# Patient Record
Sex: MALE
[De-identification: names, ages, dates, MRNs, and addresses within clinical notes are randomized per-mention and may not be internally consistent; named-entity substitution may affect disease eponyms.]

---

## 2019-02-04 ENCOUNTER — HOSPITAL ENCOUNTER (INPATIENT)
Dept: HOSPITAL 5 - ED | Age: 44
LOS: 2 days | Discharge: HOME | DRG: 189 | End: 2019-02-06
Attending: INTERNAL MEDICINE | Admitting: INTERNAL MEDICINE
Payer: OTHER GOVERNMENT

## 2019-02-04 DIAGNOSIS — J20.9: ICD-10-CM

## 2019-02-04 DIAGNOSIS — R07.89: ICD-10-CM

## 2019-02-04 DIAGNOSIS — E66.01: ICD-10-CM

## 2019-02-04 DIAGNOSIS — G47.33: ICD-10-CM

## 2019-02-04 DIAGNOSIS — Z91.041: ICD-10-CM

## 2019-02-04 DIAGNOSIS — J96.01: Primary | ICD-10-CM

## 2019-02-04 DIAGNOSIS — Z71.6: ICD-10-CM

## 2019-02-04 DIAGNOSIS — J44.0: ICD-10-CM

## 2019-02-04 DIAGNOSIS — I10: ICD-10-CM

## 2019-02-04 DIAGNOSIS — E86.1: ICD-10-CM

## 2019-02-04 DIAGNOSIS — Z79.899: ICD-10-CM

## 2019-02-04 DIAGNOSIS — E87.1: ICD-10-CM

## 2019-02-04 DIAGNOSIS — F17.210: ICD-10-CM

## 2019-02-04 DIAGNOSIS — Z82.49: ICD-10-CM

## 2019-02-04 DIAGNOSIS — J96.21: ICD-10-CM

## 2019-02-04 DIAGNOSIS — Z79.82: ICD-10-CM

## 2019-02-04 DIAGNOSIS — E87.5: ICD-10-CM

## 2019-02-04 DIAGNOSIS — J44.1: ICD-10-CM

## 2019-02-04 LAB
BASOPHILS # (AUTO): 0.1 K/MM3 (ref 0–0.1)
BASOPHILS NFR BLD AUTO: 0.9 % (ref 0–1.8)
BUN SERPL-MCNC: 8 MG/DL (ref 9–20)
BUN/CREAT SERPL: 11 %
CALCIUM SERPL-MCNC: 9.2 MG/DL (ref 8.4–10.2)
EOSINOPHIL # BLD AUTO: 0.2 K/MM3 (ref 0–0.4)
EOSINOPHIL NFR BLD AUTO: 2.9 % (ref 0–4.3)
HCT VFR BLD CALC: 46 % (ref 35.5–45.6)
HEMOLYSIS INDEX: 10
HGB BLD-MCNC: 15.7 GM/DL (ref 11.8–15.2)
INR PPP: 0.98 (ref 0.87–1.13)
LYMPHOCYTES # BLD AUTO: 2 K/MM3 (ref 1.2–5.4)
LYMPHOCYTES NFR BLD AUTO: 28.8 % (ref 13.4–35)
MCHC RBC AUTO-ENTMCNC: 34 % (ref 32–34)
MCV RBC AUTO: 95 FL (ref 84–94)
MONOCYTES # (AUTO): 0.6 K/MM3 (ref 0–0.8)
MONOCYTES % (AUTO): 9.2 % (ref 0–7.3)
PLATELET # BLD: 215 K/MM3 (ref 140–440)
RBC # BLD AUTO: 4.85 M/MM3 (ref 3.65–5.03)

## 2019-02-04 PROCEDURE — 96375 TX/PRO/DX INJ NEW DRUG ADDON: CPT

## 2019-02-04 PROCEDURE — 85007 BL SMEAR W/DIFF WBC COUNT: CPT

## 2019-02-04 PROCEDURE — 83935 ASSAY OF URINE OSMOLALITY: CPT

## 2019-02-04 PROCEDURE — 93005 ELECTROCARDIOGRAM TRACING: CPT

## 2019-02-04 PROCEDURE — 80048 BASIC METABOLIC PNL TOTAL CA: CPT

## 2019-02-04 PROCEDURE — 36415 COLL VENOUS BLD VENIPUNCTURE: CPT

## 2019-02-04 PROCEDURE — 84550 ASSAY OF BLOOD/URIC ACID: CPT

## 2019-02-04 PROCEDURE — 84300 ASSAY OF URINE SODIUM: CPT

## 2019-02-04 PROCEDURE — 71045 X-RAY EXAM CHEST 1 VIEW: CPT

## 2019-02-04 PROCEDURE — 96365 THER/PROPH/DIAG IV INF INIT: CPT

## 2019-02-04 PROCEDURE — 82553 CREATINE MB FRACTION: CPT

## 2019-02-04 PROCEDURE — 85025 COMPLETE CBC W/AUTO DIFF WBC: CPT

## 2019-02-04 PROCEDURE — 99406 BEHAV CHNG SMOKING 3-10 MIN: CPT

## 2019-02-04 PROCEDURE — 94640 AIRWAY INHALATION TREATMENT: CPT

## 2019-02-04 PROCEDURE — 94760 N-INVAS EAR/PLS OXIMETRY 1: CPT

## 2019-02-04 PROCEDURE — 82550 ASSAY OF CK (CPK): CPT

## 2019-02-04 PROCEDURE — 83880 ASSAY OF NATRIURETIC PEPTIDE: CPT

## 2019-02-04 PROCEDURE — 84443 ASSAY THYROID STIM HORMONE: CPT

## 2019-02-04 PROCEDURE — 78452 HT MUSCLE IMAGE SPECT MULT: CPT

## 2019-02-04 PROCEDURE — 83735 ASSAY OF MAGNESIUM: CPT

## 2019-02-04 PROCEDURE — 84484 ASSAY OF TROPONIN QUANT: CPT

## 2019-02-04 PROCEDURE — 93010 ELECTROCARDIOGRAM REPORT: CPT

## 2019-02-04 PROCEDURE — 85610 PROTHROMBIN TIME: CPT

## 2019-02-04 PROCEDURE — 93017 CV STRESS TEST TRACING ONLY: CPT

## 2019-02-04 PROCEDURE — A9502 TC99M TETROFOSMIN: HCPCS

## 2019-02-04 RX ADMIN — METOPROLOL TARTRATE SCH MG: 50 TABLET, FILM COATED ORAL at 18:09

## 2019-02-04 RX ADMIN — BENZONATATE SCH MG: 100 CAPSULE ORAL at 21:53

## 2019-02-04 RX ADMIN — Medication SCH ML: at 21:54

## 2019-02-04 RX ADMIN — BENZONATATE SCH: 100 CAPSULE ORAL at 08:27

## 2019-02-04 RX ADMIN — NICOTINE SCH MG: 21 PATCH TRANSDERMAL at 12:03

## 2019-02-04 RX ADMIN — Medication SCH ML: at 12:05

## 2019-02-04 RX ADMIN — SODIUM CHLORIDE SCH MLS/HR: 0.9 INJECTION, SOLUTION INTRAVENOUS at 18:10

## 2019-02-04 RX ADMIN — IPRATROPIUM BROMIDE AND ALBUTEROL SULFATE SCH: .5; 3 SOLUTION RESPIRATORY (INHALATION) at 07:53

## 2019-02-04 RX ADMIN — METOPROLOL TARTRATE SCH MG: 50 TABLET, FILM COATED ORAL at 21:53

## 2019-02-04 RX ADMIN — IPRATROPIUM BROMIDE AND ALBUTEROL SULFATE SCH AMPUL: .5; 3 SOLUTION RESPIRATORY (INHALATION) at 20:51

## 2019-02-04 RX ADMIN — IPRATROPIUM BROMIDE AND ALBUTEROL SULFATE SCH AMPUL: .5; 3 SOLUTION RESPIRATORY (INHALATION) at 14:38

## 2019-02-04 RX ADMIN — ENOXAPARIN SODIUM SCH MG: 100 INJECTION SUBCUTANEOUS at 12:04

## 2019-02-04 RX ADMIN — BENZONATATE SCH MG: 100 CAPSULE ORAL at 14:51

## 2019-02-04 NOTE — XRAY REPORT
FINAL REPORT



PROCEDURE:  XR CHEST 1V AP



TECHNIQUE:  Chest radiograph anteroposterior view. CPT 02202







HISTORY:  sob,cough 



COMPARISON:  No prior studies are available for comparison.



FINDINGS:  

Heart: Normal.



Mediastinum/Vessels: Normal.



Lungs/Pleural space: Normal.



Bony thorax: No acute osseous abnormality.



Life support devices: None.



IMPRESSION:  

No acute cardiopulmonary abnormality.

## 2019-02-04 NOTE — EMERGENCY DEPARTMENT REPORT
ED Shortness of Breath HPI





- General


Chief Complaint: Dyspnea/Respdistress


Stated Complaint: CRISTIANO


Time Seen by Provider: 02/04/19 03:29


Source: patient, EMS, old records reviewed


Mode of arrival: Stretcher


Limitations: Physical Limitation





- History of Present Illness


Initial Comments: 





44-year-old male with a past medical history of morbid obesity and hypertension 

presents to the Hospital complaining of coughing up green sputum last 2 days.  

Positive intermittent wheezing and shortness of breath.  Patient complains of 

mid constant chest pressure radiating to his back.  Patient has had similar 

symptoms in the past related to bronchitis and denies a history of asthma and 

COPD.  He does smoke cigarettes.  Upon EMS arrival he was satting 88-90% on room

air.  Saturation increased to 94% with 4 L nasal cannula he received albuterol 

nebulizer in route.  Patient denies recent travel, calf tenderness, or 

unilateral leg edema.  [facility]





- Related Data


                                  Previous Rx's











 Medication  Instructions  Recorded  Last Taken  Type


 


Pravastatin Sodium [Pravastatin] 10 mg PO QHS #30 tablet 07/10/14 Unknown Rx


 


RX: Aspirin [Aspirin TAB] 325 mg PO QDAY #30 tablet 07/10/14 Unknown Rx


 


RX: Famotidine [Pepcid] 20 mg PO BID #60 tablet 07/10/14 Unknown Rx


 


RX: Lisinopril [Zestril TAB] 10 mg PO QDAY #30 tablet 07/10/14 02/03/19 Rx


 


RX: Metoprolol [Lopressor TAB] 50 mg PO BID #60 tablet 07/10/14 02/03/19 Rx


 


RX: amLODIPine [Norvasc] 5 mg PO QDAY #30 tablet 07/10/14 Unknown Rx











                                    Allergies











Allergy/AdvReac Type Severity Reaction Status Date / Time


 


Iodinated Contrast- Oral and Allergy  Rash Verified 07/08/14 21:15





IV Dye     














ED Review of Systems


ROS: 


Stated complaint: CRISTIANO


Other details as noted in HPI





Comment: All other systems reviewed and negative





ED Past Medical Hx





- Past Medical History


Hx Hypertension: Yes


Hx Congestive Heart Failure: No


Hx Diabetes: No


Hx Asthma: No


Hx COPD: No





- Surgical History


Past Surgical History?: No





- Social History


Smoking Status: Current Every Day Smoker


Substance Use Type: Alcohol





- Medications


Home Medications: 


                                Home Medications











 Medication  Instructions  Recorded  Confirmed  Last Taken  Type


 


Pravastatin Sodium [Pravastatin] 10 mg PO QHS #30 tablet 07/10/14 02/04/19 

Unknown Rx


 


RX: Aspirin [Aspirin TAB] 325 mg PO QDAY #30 tablet 07/10/14 02/04/19 Unknown Rx


 


RX: Famotidine [Pepcid] 20 mg PO BID #60 tablet 07/10/14 02/04/19 Unknown Rx


 


RX: Lisinopril [Zestril TAB] 10 mg PO QDAY #30 tablet 07/10/14 02/04/19 02/03/19

Rx


 


RX: Metoprolol [Lopressor TAB] 50 mg PO BID #60 tablet 07/10/14 02/04/19 

02/03/19 Rx


 


RX: amLODIPine [Norvasc] 5 mg PO QDAY #30 tablet 07/10/14 02/04/19 Unknown Rx














ED Physical Exam





- General


Limitations: Physical Limitation





- Other


Other exam information: 





General: No limitations, patient is alert in no acute distress


Head exam: Atraumatic, normocephalic


Eyes exam: Normal appearance, pupils equal reactive to light, extraocular 

movements intact


ENT: Moist mucous membrane, normal oropharynx


Neck exam: Normal inspection, full range of motion, no meningismus nontender


Respiratory exam: Frequent coughing.  Bilateral wheezing.  Tachypnea without 

accessory muscle use


Cardiovascular: Normal rate and rhythm, normal heart sounds


Abdomen: Soft, nondistended, and  nontender, with normal bowel sounds, no edwina

ound, or guarding


Extremity: Full range of motion normal inspection no deformity, no calf 

tenderness or edema


Back: Normal Inspection, full range of motion, no tenderness


Neurologic: Alert, oriented x3, cranial nerves intact, no motor or sensory 

deficit


Psychiatric: normal affect, normal mood


Skin: Warm, dry, intact





ED Course


                                   Vital Signs











  02/04/19 02/04/19 02/04/19





  02:43 02:46 02:48


 


Temperature   98 F


 


Pulse Rate  98 H 94 H


 


Pulse Rate [   





Anterior   





Bilateral   





Throughout]   


 


Respiratory  15 





Rate   


 


Respiratory   





Rate [Anterior   





Bilateral   





Throughout]   


 


Blood Pressure   113/53


 


O2 Sat by Pulse 94 93 94





Oximetry   














  02/04/19 02/04/19 02/04/19





  02:57 03:00 03:15


 


Temperature   


 


Pulse Rate  89 92 H


 


Pulse Rate [   





Anterior   





Bilateral   





Throughout]   


 


Respiratory 26 H 20 22





Rate   


 


Respiratory   





Rate [Anterior   





Bilateral   





Throughout]   


 


Blood Pressure  108/55 116/63


 


O2 Sat by Pulse 94 92 





Oximetry   














  02/04/19 02/04/19 02/04/19





  03:30 03:45 04:00


 


Temperature   


 


Pulse Rate 93 H 91 H 90


 


Pulse Rate [   





Anterior   





Bilateral   





Throughout]   


 


Respiratory 21 20 23





Rate   


 


Respiratory   





Rate [Anterior   





Bilateral   





Throughout]   


 


Blood Pressure 112/56  


 


O2 Sat by Pulse   





Oximetry   














  02/04/19 02/04/19 02/04/19





  04:13 04:16 04:30


 


Temperature   


 


Pulse Rate  96 H 95 H


 


Pulse Rate [ 92 H  





Anterior   





Bilateral   





Throughout]   


 


Respiratory  18 





Rate   


 


Respiratory 17  





Rate [Anterior   





Bilateral   





Throughout]   


 


Blood Pressure   109/52


 


O2 Sat by Pulse   94





Oximetry   














  02/04/19 02/04/19 02/04/19





  04:45 05:00 05:16


 


Temperature   


 


Pulse Rate 87 88 96 H


 


Pulse Rate [  95 H 





Anterior   





Bilateral   





Throughout]   


 


Respiratory 19 18 16





Rate   


 


Respiratory  20 





Rate [Anterior   





Bilateral   





Throughout]   


 


Blood Pressure 120/63 120/63 110/51


 


O2 Sat by Pulse 93 92 86





Oximetry   














  02/04/19 02/04/19 02/04/19





  05:30 05:45 06:00


 


Temperature   


 


Pulse Rate 97 H 86 93 H


 


Pulse Rate [   





Anterior   





Bilateral   





Throughout]   


 


Respiratory 22 16 24





Rate   


 


Respiratory   





Rate [Anterior   





Bilateral   





Throughout]   


 


Blood Pressure 103/70 110/55 102/53


 


O2 Sat by Pulse   





Oximetry   














  02/04/19 02/04/19 02/04/19





  06:10 06:20 06:30


 


Temperature   


 


Pulse Rate 93 H 85 94 H


 


Pulse Rate [   





Anterior   





Bilateral   





Throughout]   


 


Respiratory 25 H 16 17





Rate   


 


Respiratory   





Rate [Anterior   





Bilateral   





Throughout]   


 


Blood Pressure 102/53 96/41 96/41


 


O2 Sat by Pulse   





Oximetry   














  02/04/19 02/04/19 02/04/19





  06:40 06:49 06:50


 


Temperature   


 


Pulse Rate 88  92 H


 


Pulse Rate [  90 





Anterior   





Bilateral   





Throughout]   


 


Respiratory 20  16





Rate   


 


Respiratory  17 





Rate [Anterior   





Bilateral   





Throughout]   


 


Blood Pressure 116/56  117/44


 


O2 Sat by Pulse   





Oximetry   














  02/04/19 02/04/19 02/04/19





  07:00 07:10 07:20


 


Temperature   


 


Pulse Rate 91 H 93 H 99 H


 


Pulse Rate [   





Anterior   





Bilateral   





Throughout]   


 


Respiratory   





Rate   


 


Respiratory   





Rate [Anterior   





Bilateral   





Throughout]   


 


Blood Pressure 117/44 96/41 96/41


 


O2 Sat by Pulse   





Oximetry   














  02/04/19 02/04/19 02/04/19





  07:30 07:40 07:50


 


Temperature   


 


Pulse Rate  99 H 92 H


 


Pulse Rate [   





Anterior   





Bilateral   





Throughout]   


 


Respiratory   





Rate   


 


Respiratory   





Rate [Anterior   





Bilateral   





Throughout]   


 


Blood Pressure 96/41 117/44 117/44


 


O2 Sat by Pulse   





Oximetry   














  02/04/19 02/04/19 02/04/19





  08:00 08:10 08:20


 


Temperature   


 


Pulse Rate 101 H 103 H 99 H


 


Pulse Rate [   





Anterior   





Bilateral   





Throughout]   


 


Respiratory   





Rate   


 


Respiratory   





Rate [Anterior   





Bilateral   





Throughout]   


 


Blood Pressure 117/44 117/44 117/44


 


O2 Sat by Pulse   





Oximetry   














  02/04/19





  08:30


 


Temperature 


 


Pulse Rate 97 H


 


Pulse Rate [ 





Anterior 





Bilateral 





Throughout] 


 


Respiratory 





Rate 


 


Respiratory 





Rate [Anterior 





Bilateral 





Throughout] 


 


Blood Pressure 117/44


 


O2 Sat by Pulse 99





Oximetry 














ED Medical Decision Making





- Lab Data


Result diagrams: 


                                 02/04/19 03:38





                                 02/04/19 03:38








                                   Lab Results











  02/04/19 02/04/19 02/04/19 Range/Units





  03:38 03:38 03:38 


 


WBC  6.9    (4.5-11.0)  K/mm3


 


RBC  4.85    (3.65-5.03)  M/mm3


 


Hgb  15.7 H    (11.8-15.2)  gm/dl


 


Hct  46.0 H    (35.5-45.6)  %


 


MCV  95 H    (84-94)  fl


 


MCH  32    (28-32)  pg


 


MCHC  34    (32-34)  %


 


RDW  14.2    (13.2-15.2)  %


 


Plt Count  215    (140-440)  K/mm3


 


Lymph % (Auto)  28.8    (13.4-35.0)  %


 


Mono % (Auto)  9.2 H    (0.0-7.3)  %


 


Eos % (Auto)  2.9    (0.0-4.3)  %


 


Baso % (Auto)  0.9    (0.0-1.8)  %


 


Lymph #  2.0    (1.2-5.4)  K/mm3


 


Mono #  0.6    (0.0-0.8)  K/mm3


 


Eos #  0.2    (0.0-0.4)  K/mm3


 


Baso #  0.1    (0.0-0.1)  K/mm3


 


Seg Neutrophils %  58.2    (40.0-70.0)  %


 


Seg Neutrophils #  4.0    (1.8-7.7)  K/mm3


 


PT   13.4   (12.2-14.9)  Sec.


 


INR   0.98   (0.87-1.13)  


 


Sodium     (137-145)  mmol/L


 


Potassium     (3.6-5.0)  mmol/L


 


Chloride     ()  mmol/L


 


Carbon Dioxide     (22-30)  mmol/L


 


Anion Gap     mmol/L


 


BUN     (9-20)  mg/dL


 


Creatinine     (0.8-1.5)  mg/dL


 


Estimated GFR     ml/min


 


BUN/Creatinine Ratio     %


 


Glucose     ()  mg/dL


 


Calcium     (8.4-10.2)  mg/dL


 


Total Creatine Kinase    267 H  ()  units/L


 


CK-MB (CK-2)    7.0 H  (0.0-4.0)  ng/mL


 


CK-MB (CK-2) Rel Index    2.6  (0-4)  


 


Troponin T    < 0.010  (0.00-0.029)  ng/mL


 


NT-Pro-B Natriuret Pep    124.8  (0-450)  pg/mL














  02/04/19 Range/Units





  03:38 


 


WBC   (4.5-11.0)  K/mm3


 


RBC   (3.65-5.03)  M/mm3


 


Hgb   (11.8-15.2)  gm/dl


 


Hct   (35.5-45.6)  %


 


MCV   (84-94)  fl


 


MCH   (28-32)  pg


 


MCHC   (32-34)  %


 


RDW   (13.2-15.2)  %


 


Plt Count   (140-440)  K/mm3


 


Lymph % (Auto)   (13.4-35.0)  %


 


Mono % (Auto)   (0.0-7.3)  %


 


Eos % (Auto)   (0.0-4.3)  %


 


Baso % (Auto)   (0.0-1.8)  %


 


Lymph #   (1.2-5.4)  K/mm3


 


Mono #   (0.0-0.8)  K/mm3


 


Eos #   (0.0-0.4)  K/mm3


 


Baso #   (0.0-0.1)  K/mm3


 


Seg Neutrophils %   (40.0-70.0)  %


 


Seg Neutrophils #   (1.8-7.7)  K/mm3


 


PT   (12.2-14.9)  Sec.


 


INR   (0.87-1.13)  


 


Sodium  128 L  (137-145)  mmol/L


 


Potassium  4.4  (3.6-5.0)  mmol/L


 


Chloride  90.9 L  ()  mmol/L


 


Carbon Dioxide  24  (22-30)  mmol/L


 


Anion Gap  18  mmol/L


 


BUN  8 L  (9-20)  mg/dL


 


Creatinine  0.7 L  (0.8-1.5)  mg/dL


 


Estimated GFR  > 60  ml/min


 


BUN/Creatinine Ratio  11  %


 


Glucose  124 H  ()  mg/dL


 


Calcium  9.2  (8.4-10.2)  mg/dL


 


Total Creatine Kinase   ()  units/L


 


CK-MB (CK-2)   (0.0-4.0)  ng/mL


 


CK-MB (CK-2) Rel Index   (0-4)  


 


Troponin T   (0.00-0.029)  ng/mL


 


NT-Pro-B Natriuret Pep   (0-450)  pg/mL














- EKG Data


-: EKG Interpreted by Me


EKG shows normal: sinus rhythm, axis (qrs 69), QRS complexes (qrsd 105), ST-T 

waves (no stemi)


Rate: normal (93)





- EKG Data


When compared to previous EKG there are: no significant change





- Radiology Data


Radiology results: report reviewed (cxr: naf)





- Medical Decision Making





Patient continues to have desaturation once he is off oxygen despite receiving 

medications.  He will be admitted to the hospital for acute bronchitis with 

hypoxia.





- Differential Diagnosis


pneumonia, bronchitis, CHF, COPD


Critical Care Time: No


Critical care attestation.: 


If time is entered above; I have spent that time in minutes in the direct care 

of this critically ill patient, excluding procedure time.








ED Disposition


Clinical Impression: 


 Acute bronchitis, Hypoxia





Disposition: [id number] OP ADMIT IP TO THIS HOSP


Is pt being admited?: No


Does the pt Need Aspirin: No


Condition: Stable


Time of Disposition: 05:25 (Dr Courtney/hosp)

## 2019-02-04 NOTE — CONSULTATION
History of Present Illness


Consult date: 02/04/19


Reason for consult: dyspnea, chest pain, COPD


History of present illness: 





44-year-old male who presented to the ED with 3-4 days history of progressive 

chest tightness, chest pain, associated with shortness of breath and wheezing.  

The patient reports chest pain associated with persistent coughing episodes.  He

reports yellow to green colored phlegm.  Chest pain reported has precordial 

which some chest tightness.  He is an active smoker 2 packs per day for the last

20 years and was smoking up to the time of admission.  He denies fever or i

nfluenza-like illness symptoms.


He does report prior episodes of vigorous coughing prior to this.  Episodes were

strong enough for him to pass out.


Also reports sleep history that appeared to be consistent with sleep apnea-

reportedly snoring heavily, fell asleep during his cardiac scan with snoring and

choking.  Also poor sleep.  He denies prior history of asthma or COPD.


Ask to evaluate.  [facility]





Past History


Past Medical History: COPD, hypertension


Past Surgical History: No surgical history


Social history: other (patient is a smoker)


Family history: no significant family history





Medications and Allergies


                                    Allergies











Allergy/AdvReac Type Severity Reaction Status Date / Time


 


Iodinated Contrast- Oral and Allergy  Rash Verified 07/08/14 21:15





IV Dye     











                                Home Medications











 Medication  Instructions  Recorded  Confirmed  Last Taken  Type


 


Aspirin [Aspirin TAB] 325 mg PO QDAY #30 tablet 07/10/14 02/04/19 Unknown Rx


 


Famotidine [Pepcid] 20 mg PO BID #60 tablet 07/10/14 02/04/19 Unknown Rx


 


Lisinopril [Zestril TAB] 10 mg PO QDAY #30 tablet 07/10/14 02/04/19 02/03/19 Rx


 


Metoprolol [Lopressor TAB] 50 mg PO BID #60 tablet 07/10/14 02/04/19 02/03/19 Rx


 


Pravastatin Sodium [Pravastatin] 10 mg PO QHS #30 tablet 07/10/14 02/04/19 

Unknown Rx


 


amLODIPine [Norvasc] 5 mg PO QDAY #30 tablet 07/10/14 02/04/19 Unknown Rx











Active Meds: 


Active Medications





Acetaminophen (Tylenol)  650 mg PO Q4H PRN


   PRN Reason: Pain MILD(1-3)/Fever >100.5/HA


Albuterol/Ipratropium (Duoneb *Not For Prn Use*)  1 ampul IH Q6HRT [facility]


   Last Admin: 02/04/19 07:53 Dose:  Not Given


   Documented by: 


Benzonatate (Tessalon Perles)  100 mg PO Q8HR [facility]


   Last Admin: 02/04/19 08:27 Dose:  Not Given


   Documented by: 


Enoxaparin Sodium (Lovenox)  40 mg SUB-Q QDAY@1000 [facility]


   Last Admin: 02/04/19 12:04 Dose:  40 mg


   Documented by: 


Sodium Chloride (Nacl 0.9% 1000 Ml)  1,000 mls @ 75 mls/hr IV AS DIRECT [facility]


Methylprednisolone Sodium Succinate (Solu-Medrol)  40 mg IV Q8H [facility]


   Last Admin: 02/04/19 12:04 Dose:  40 mg


   Documented by: 


Nicotine (Habitrol)  21 mg TD QDAY [facility]


   Last Admin: 02/04/19 12:03 Dose:  21 mg


   Documented by: 


Ondansetron HCl (Zofran)  4 mg IV Q8H PRN


   PRN Reason: Nausea And Vomiting


Sodium Chloride (Sodium Chloride Flush Syringe 10 Ml)  10 ml IV BID [facility]


   Last Admin: 02/04/19 12:05 Dose:  10 ml


   Documented by: 


Sodium Chloride (Sodium Chloride Flush Syringe 10 Ml)  10 ml IV PRN PRN


   PRN Reason: LINE FLUSH











Physical Examination


Vital signs: 


                                   Vital Signs











Pulse Ox


 


 94 


 


 02/04/19 02:43











General appearance: no acute distress, other (morbidly obese)


Eyes: non-icteric


ENT: oropharynx moist, other (Mallampati 3-4)


Neck: supple, no JVD


Ascultation: Bilateral: diminished breath sounds, wheezes


Cardiovascular: regular rate and rhythm


Gastrointestinal: normoactive bowel sounds, non-distended


Integumentary: normal


Extremities: no cyanosis, no edema


Gait: normal gait


normal mental status, non-focal exam, pupils equal and round, CN II-XII normal, 

motor strength normal and


mood appropriate, affect normal





Results





- Laboratory Findings


CBC and BMP: 


                                 02/04/19 03:38





                                 02/04/19 03:38


PT/INR, D-dimer











PT  13.4 Sec. (12.2-14.9)   02/04/19  03:38    


 


INR  0.98  (0.87-1.13)   02/04/19  03:38    








Abnormal lab findings: 


                                  Abnormal Labs











  02/04/19 02/04/19 02/04/19





  03:38 03:38 03:38


 


Hgb  15.7 H  


 


Hct  46.0 H  


 


MCV  95 H  


 


Mono % (Auto)  9.2 H  


 


Sodium    128 L


 


Chloride    90.9 L


 


BUN    8 L


 


Creatinine    0.7 L


 


Glucose    124 H


 


Total Creatine Kinase   267 H 


 


CK-MB (CK-2)   7.0 H 














  02/04/19 02/04/19





  06:36 10:07


 


Hgb  


 


Hct  


 


MCV  


 


Mono % (Auto)  


 


Sodium  


 


Chloride  


 


BUN  


 


Creatinine  


 


Glucose  


 


Total Creatine Kinase  262 H 


 


CK-MB (CK-2)  6.2 H  6.7 H














- Diagnostic Findings


Chest x-ray: report reviewed, image reviewed





Assessment and Plan





COPD with exacerbation


Acute exacerbation of chronic bronchitis


Chest pain.  Under evaluation by cardiology


Hyponatremia.  Nephrology following, SIADH suspected


Suspected SDB/LORE


Morbid obesity


Hypertension








Recommendations





Complete cardiac evaluation for CAD, atypical chest pain


Albuterol 2.5 milligram nebulizations every 4-6 hours with or without 

ipratropium


Solu-Medrol 40-60 mg IV every 6-8 hours


Consider 5 days of Rocephin or suitable equivalent


Oxygen support via nasal cannula or mask to maintain oximetry over 92%


Additional evaluation for weight reduction program


Smoking cessation discussed in detail.


When the further evaluation as an outpatient both for COPD and for LORE.


DVT prophylaxis











Findings were discussed with the patient in detail.  All questions answered.  

Smoking cessation was strongly encouraged, initial intervention








Thanks

## 2019-02-04 NOTE — HISTORY AND PHYSICAL REPORT
History of Present Illness


Date of examination: 02/04/19


History of present illness: 


43-year-old man with a history of hypertension, morbid obesity, emergency room 

visits or shortness of breath 2 days.  He complains of cough productive of 

green phlegm, wheezing.  Also complaining of chest pain in the epigastric area 

which he describes a pressure-like sensation, intermittent, unable to say how 

long it lasts for, radiating to the back, intensity 5/10, worse with coughing.  

Admits to diaphoresis, no nausea vomiting, palpitation


Review of systems


Constitutional: no  weight loss, chills, fever


Ears, eyes, nose, mouth and throat: no nasal congestion, no nasal discharge, no 

sinus pressure, no vision change, no red eye.


Neck: No neck pain or rigidity.


Cardiovascular: no palpitations, chest pain


Respiratory: +cough, shortness of breath


Gastrointestinal: no hematochezia, abdominal pain


Genitourinary : no  frequency , no hematuria


Musculoskeletal: no joint swelling or muscle ache 


Integumentary: no rash, no pruritis


Neurological: no parathesias, no focal weakness


Endocrine: no cold or heat intolerance, no polyuria or polydipsia


Hematologic/Lymphatic: no easy bruising, no easy bleeding, no gland swelling


Allergic/Immunologic: no urticaria, no angioedema.





PAST MEDICAL HISTORY:hypertension, morbid obesity





PAST SURGICAL HISTORY: None





SOCIAL HISTORY: Denies alcohol,  drugs, smoke 2 packs a day 





FAMILY HISTORY: Hypertension








Medications and Allergies


                                    Allergies











Allergy/AdvReac Type Severity Reaction Status Date / Time


 


Iodinated Contrast- Oral and Allergy  Rash Verified 07/08/14 21:15





IV Dye     











                                Home Medications











 Medication  Instructions  Recorded  Confirmed  Last Taken  Type


 


RX: Aspirin [Aspirin TAB] 325 mg PO QDAY #30 tablet 07/10/14 02/04/19 Unknown Rx


 


RX: Famotidine [Pepcid] 20 mg PO BID #60 tablet 07/10/14 02/04/19 Unknown Rx


 


RX: Lisinopril [Zestril TAB] 10 mg PO QDAY #30 tablet 07/10/14 02/04/19 02/03/19

Rx


 


RX: Pravastatin Sodium 10 mg PO QHS #30 tablet 07/10/14 02/04/19 Unknown Rx





[Pravastatin]     


 


RX: amLODIPine [Norvasc] 5 mg PO QDAY #30 tablet 07/10/14 02/04/19 Unknown Rx


 


Prednisone [predniSONE 10 mg 10 mg PO .TAPER #1 tab.ds.pk 02/06/19  Unknown Rx





(6-Day Pack, 21 Tabs)]     


 


RX: ALBUTEROL Inhaler(NF) 2 puff IH QID PRN #1 inha 02/06/19  Unknown Rx





[VENTOLIN Inhaler(NF)]     


 


RX: Benzonatate [Tessalon Perles] 100 mg PO Q8HR #30 capsule 02/06/19  Unknown 

Rx


 


RX: Nicotine [Habitrol] 21 mg TD QDAY #30 patch 02/06/19  Unknown Rx











Active Meds: 


Active Medications





Acetaminophen (Tylenol)  650 mg PO Q4H PRN


   PRN Reason: Pain MILD(1-3)/Fever >100.5/HA


Albuterol/Ipratropium (Duoneb *Not For Prn Use*)  1 ampul IH Q6HRT [facility]


Enoxaparin Sodium (Lovenox)  30 mg SUB-Q QDAY [facility]


Methylprednisolone Sodium Succinate (Solu-Medrol)  125 mg IV Q6H [facility]


Ondansetron HCl (Zofran)  4 mg IV Q8H PRN


   PRN Reason: Nausea And Vomiting


Sodium Chloride (Sodium Chloride Flush Syringe 10 Ml)  10 ml IV BID [facility]


Sodium Chloride (Sodium Chloride Flush Syringe 10 Ml)  10 ml IV PRN PRN


   PRN Reason: LINE FLUSH











Exam





- Physical Exam


Narrative exam: 


General Apperance: The patient lying in bed,  breathing comfortable 


HEENT: Normocephalic, atraumatic.  Pupils equally round and reactive to light, 

EOMI, no sclericterus or JVD or thyromegaly or nodule.  , no carotid bruit, 

mucous membranes moist, no exudate or erythema


Heart: S1-S2, regular is rhythm


Lungs: Wheezing bilaterally, breathing comfortable


Abdomen: Positive bowel sounds, soft, nontender, nondistended, no organomegaly


Extremities: No edema cyanosis clubbing


Skin: no rash, nodule, warm and dry


Neuro: cranial nerves 2-12 intact, speech is fluent, motor/sensory intact








- Constitutional


Vitals: 


                                        











Temp Pulse Resp BP Pulse Ox


 


 98 F   86   16   110/55   86 


 


 02/04/19 02:48  02/04/19 05:45  02/04/19 05:45  02/04/19 05:45  02/04/19 05:16














Results





- Labs


CBC & Chem 7: 


                                 02/06/19 05:25





                                 02/06/19 05:25


Labs: 


                              Abnormal lab results











  02/04/19 02/04/19 02/04/19 Range/Units





  03:38 03:38 03:38 


 


Hgb  15.7 H    (11.8-15.2)  gm/dl


 


Hct  46.0 H    (35.5-45.6)  %


 


MCV  95 H    (84-94)  fl


 


Mono % (Auto)  9.2 H    (0.0-7.3)  %


 


Sodium    128 L  (137-145)  mmol/L


 


Chloride    90.9 L  ()  mmol/L


 


BUN    8 L  (9-20)  mg/dL


 


Creatinine    0.7 L  (0.8-1.5)  mg/dL


 


Glucose    124 H  ()  mg/dL


 


Total Creatine Kinase   267 H   ()  units/L


 


CK-MB (CK-2)   7.0 H   (0.0-4.0)  ng/mL














- Imaging and Cardiology


EKG: image reviewed


Chest x-ray: report reviewed





Assessment and Plan


Assessment


Acute bronchitis


Hyponatremia


Chest pain


Morbid morbid obesity


Plan


Admit to medicine


High-dose steroids, breathing treatments, Zithromax


Cardiac enzymes, stress test


DVT prophylaxis

## 2019-02-04 NOTE — CONSULTATION
History of Present Illness





- Reason for Consult


Consult date: 02/04/19


hyponatremia


Requesting physician: LYNDSEY JAUREGUI





- History of Present Illness


43-year-old man with a history of hypertension, morbid obesity, emergency room 

visits or shortness of breath 2 days.  He complains of cough productive of 

green phlegm, wheezing.  Also complaining of chest pain in the epigastric area 

which he describes a pressure-like sensation, intermittent, unable to sell along

the lesser, radiating to the back, intensity 5/10, worse with coughing.  Admits 

to diaphoresis, no nausea vomiting, palpitation. 


Consult requested because of hyponatremia





Past History


Past Medical History: COPD, hypertension


Past Surgical History: No surgical history


Social history: other (patient is a smoker)


Family history: no significant family history





Medications and Allergies


                                    Allergies











Allergy/AdvReac Type Severity Reaction Status Date / Time


 


Iodinated Contrast- Oral and Allergy  Rash Verified 07/08/14 21:15





IV Dye     











                                Home Medications











 Medication  Instructions  Recorded  Confirmed  Last Taken  Type


 


Aspirin [Aspirin TAB] 325 mg PO QDAY #30 tablet 07/10/14 02/04/19 Unknown Rx


 


Famotidine [Pepcid] 20 mg PO BID #60 tablet 07/10/14 02/04/19 Unknown Rx


 


Lisinopril [Zestril TAB] 10 mg PO QDAY #30 tablet 07/10/14 02/04/19 02/03/19 Rx


 


Metoprolol [Lopressor TAB] 50 mg PO BID #60 tablet 07/10/14 02/04/19 02/03/19 Rx


 


Pravastatin Sodium [Pravastatin] 10 mg PO QHS #30 tablet 07/10/14 02/04/19 

Unknown Rx


 


amLODIPine [Norvasc] 5 mg PO QDAY #30 tablet 07/10/14 02/04/19 Unknown Rx











Active Meds: 


Active Medications





Acetaminophen (Tylenol)  650 mg PO Q4H PRN


   PRN Reason: Pain MILD(1-3)/Fever >100.5/HA


Albuterol/Ipratropium (Duoneb *Not For Prn Use*)  1 ampul IH Q6HRT [facility]


   Last Admin: 02/04/19 07:53 Dose:  Not Given


   Documented by: 


Benzonatate (Tessalon Perles)  100 mg PO Q8HR [facility]


Enoxaparin Sodium (Lovenox)  40 mg SUB-Q QDAY@1000 MING


Sodium Chloride (Nacl 0.9% 1000 Ml)  1,000 mls @ 75 mls/hr IV AS DIRECT MING


Methylprednisolone Sodium Succinate (Solu-Medrol)  40 mg IV Q8HR MING


Nicotine (Habitrol)  21 mg TD QDAY MING


Ondansetron HCl (Zofran)  4 mg IV Q8H PRN


   PRN Reason: Nausea And Vomiting


Sodium Chloride (Sodium Chloride Flush Syringe 10 Ml)  10 ml IV BID MING


Sodium Chloride (Sodium Chloride Flush Syringe 10 Ml)  10 ml IV PRN PRN


   PRN Reason: LINE FLUSH











Review of Systems


All systems: negative (negative except as noted above)





Exam





- Vital Signs


Vital signs: 


                                   Vital Signs











Pulse Ox


 


 94 


 


 02/04/19 02:43














- General Appearance


General appearance: well-developed, well-nourished, appears stated age, obese


EENT: ATNC, PERRL, mucous membranes moist


Neck: Present: neck supple, trachea midline.  Absent: JVD/HJR, Masses


Respiratory: Wheezes (bilateral wheezing)


Heart: regular, normal heart rate, S1S2, no murmurs


Gastrointestinal: Present: normal, normoactive bowel sounds


Integumentary: other (no edema)





Results





- Lab Results





                                 02/04/19 03:38





                                 02/04/19 03:38


                             Most recent lab results











Calcium  9.2 mg/dL (8.4-10.2)   02/04/19  03:38    














Assessment and Plan


Impression


* Hyponatremia.  Patient is clinically euvolemic


* Shortness of breath.  Most likely secondary to COPD


* Chest pain


* Hypertension


* Obesity


Recommendation


* Suspect patient  has underlying SIADH.


* Shall do  hyponatremia workup


* Restrict free water intake


* Avoid hydrochlorothiazide and other medications that can aggravate 

  hyponatremia


* Monitor fluid status and electrolytes closely


* Thank you very much for the consultation.  Shall follow along with you

## 2019-02-04 NOTE — PROGRESS NOTE
Assessment and Plan


Assessment and plan: 


Patient is a Patient is a 43-year-old man with a history of hypertension, 

dyslipidemia, morbid obesity and Tobacco dependency who presents this morning to

[facility] ED with SOB x 2 days.





* pCXR reported no acute cardiopulmonary process





Suspected Acute hypoxic respiratory failure, poa on VM 50%, EMS reported a low 

pulse ox of 88% RA: continue o2 therapy and nebs, consulted Pulmonology


Acute bronchitis: treat with abx and nebs, utility of steroids


Moderate to severe Hyponatremia, hypovolemia: treat with ivf, repeat in am


Cushingoid appearance and hyponatremia: consulted Nephrology, ?steroids


Chest pain, atypical, related to coughing most likely, father  of MI though:

Cardiac enzymes, stress test ordered


Morbid obesity, bmi 49.5


Tobacco dependency (~2 ppd):  on stopping, offer and ordered nicotine 

patch


DVT prophylaxis: sq lovenox





Prolonged inpatient services 32 minutes








History


Interval history: 


Patient was seen and examined. Follow-up on current diagnosis of SOB and chest 

pains. Chest pains is due to excessive coughing, Overnight uneventful. Patient 

denies any nausea/vomiting or severe headaches. Imaging, nursing note, chart, 

labs and old chart reviewed. Discussed with patient. 





Hospitalist Physical





- Physical exam


Narrative exam: 


Gen: Morbid Obese, bmi 49.5Cushingoid vs blue bloater appearance, ill appearing,

on VM 50%, NAD, Awake, Alert, Orientated x 3


HEENT: NCAT, EOMI, PERRL, OP Clear 


Neck: supple, no adenopathy, no thyromegaly, no JVD 


CVS/Heart: RRR, normal S1S2, pulses present bilaterally 


Chest/Lungs: diminished bs bilateral, Symmetrical chest expansion, good air 

entry bilaterally, reproducible substernal cw tenderness


GI/Abdomen: soft, abd striae, NTND, good bowel sounds, no guarding or rebound 


/Bladder: no suprapubic tenderness, no CVA or paraspinal tenderness 


Extermity/Skin: no c/c/e, no obvious rash 


MSK: FROM x 4 


Neuro: CN 2-12 grossly intact, no new focal deficits 


Psych: calm 





- Constitutional


Vitals: 


                                        











Temp Pulse Resp BP Pulse Ox


 


 98 F   90   17   110/55   86 


 


 19 02:48  19 06:49  19 06:49  19 05:45  19 05:16














Results





- Labs


CBC & Chem 7: 


                                 19 03:38





                                 19 03:38


Labs: 


                             Laboratory Last Values











WBC  6.9 K/mm3 (4.5-11.0)   19  03:38    


 


RBC  4.85 M/mm3 (3.65-5.03)   19  03:38    


 


Hgb  15.7 gm/dl (11.8-15.2)  H  19  03:38    


 


Hct  46.0 % (35.5-45.6)  H  19  03:38    


 


MCV  95 fl (84-94)  H  19  03:38    


 


MCH  32 pg (28-32)   19  03:38    


 


MCHC  34 % (32-34)   19  03:38    


 


RDW  14.2 % (13.2-15.2)   19  03:38    


 


Plt Count  215 K/mm3 (140-440)   19  03:38    


 


Lymph % (Auto)  28.8 % (13.4-35.0)   19  03:38    


 


Mono % (Auto)  9.2 % (0.0-7.3)  H  19  03:38    


 


Eos % (Auto)  2.9 % (0.0-4.3)   19  03:38    


 


Baso % (Auto)  0.9 % (0.0-1.8)   19  03:38    


 


Lymph #  2.0 K/mm3 (1.2-5.4)   19  03:38    


 


Mono #  0.6 K/mm3 (0.0-0.8)   19  03:38    


 


Eos #  0.2 K/mm3 (0.0-0.4)   19  03:38    


 


Baso #  0.1 K/mm3 (0.0-0.1)   19  03:38    


 


Seg Neutrophils %  58.2 % (40.0-70.0)   19  03:38    


 


Seg Neutrophils #  4.0 K/mm3 (1.8-7.7)   19  03:38    


 


PT  13.4 Sec. (12.2-14.9)   19  03:38    


 


INR  0.98  (0.87-1.13)   19  03:38    


 


Sodium  128 mmol/L (137-145)  L  19  03:38    


 


Potassium  4.4 mmol/L (3.6-5.0)   19  03:38    


 


Chloride  90.9 mmol/L ()  L  19  03:38    


 


Carbon Dioxide  24 mmol/L (22-30)   19  03:38    


 


Anion Gap  18 mmol/L  19  03:38    


 


BUN  8 mg/dL (9-20)  L  19  03:38    


 


Creatinine  0.7 mg/dL (0.8-1.5)  L  19  03:38    


 


Estimated GFR  > 60 ml/min  19  03:38    


 


BUN/Creatinine Ratio  11 %  19  03:38    


 


Glucose  124 mg/dL ()  H  19  03:38    


 


Calcium  9.2 mg/dL (8.4-10.2)   19  03:38    


 


Total Creatine Kinase  267 units/L ()  H  19  03:38    


 


CK-MB (CK-2)  6.2 ng/mL (0.0-4.0)  H  19  06:36    


 


CK-MB (CK-2) Rel Index  2.6  (0-4)   19  03:38    


 


Troponin T  < 0.010 ng/mL (0.00-0.029)   19  06:36    


 


NT-Pro-B Natriuret Pep  124.8 pg/mL (0-450)   19  03:38

## 2019-02-04 NOTE — TREADMILL REPORT
THALLIUM STRESS TEST



LEFT VENTRICLE:  Left ventricular chamber size is within normal spread. 

Perfusion study demonstrates homogeneous uptake of the tracer in all segments,

no significant perfusion defects identified.  Gated analysis demonstrates normal

left ventricular systolic function, ejection fraction greater than 70%.



CONCLUSION:  Normal myocardial perfusion study.





DD: 02/04/2019 11:36

DT: 02/04/2019 17:12

JOB# [id number]  [id number]

CA/NTS

## 2019-02-05 LAB
ANISOCYTOSIS BLD QL SMEAR: (no result)
BAND NEUTROPHILS # (MANUAL): 0.4 K/MM3
BUN SERPL-MCNC: 8 MG/DL (ref 9–20)
BUN/CREAT SERPL: 16 %
CALCIUM SERPL-MCNC: 9.2 MG/DL (ref 8.4–10.2)
HCT VFR BLD CALC: 48 % (ref 35.5–45.6)
HEMOLYSIS INDEX: 5
HGB BLD-MCNC: 15.9 GM/DL (ref 11.8–15.2)
MCHC RBC AUTO-ENTMCNC: 33 % (ref 32–34)
MCV RBC AUTO: 97 FL (ref 84–94)
MYELOCYTES # (MANUAL): 0 K/MM3
PLATELET # BLD: 229 K/MM3 (ref 140–440)
PROMYELOCYTES # (MANUAL): 0 K/MM3
RBC # BLD AUTO: 4.96 M/MM3 (ref 3.65–5.03)
TOTAL CELLS COUNTED BLD: 100

## 2019-02-05 RX ADMIN — SODIUM CHLORIDE SCH MLS/HR: 0.9 INJECTION, SOLUTION INTRAVENOUS at 05:55

## 2019-02-05 RX ADMIN — BENZONATATE SCH MG: 100 CAPSULE ORAL at 21:53

## 2019-02-05 RX ADMIN — BENZONATATE SCH MG: 100 CAPSULE ORAL at 05:55

## 2019-02-05 RX ADMIN — ENOXAPARIN SODIUM SCH MG: 100 INJECTION SUBCUTANEOUS at 09:22

## 2019-02-05 RX ADMIN — Medication SCH ML: at 22:08

## 2019-02-05 RX ADMIN — IPRATROPIUM BROMIDE AND ALBUTEROL SULFATE SCH: .5; 3 SOLUTION RESPIRATORY (INHALATION) at 03:48

## 2019-02-05 RX ADMIN — METOPROLOL TARTRATE SCH MG: 50 TABLET, FILM COATED ORAL at 22:02

## 2019-02-05 RX ADMIN — NICOTINE SCH MG: 21 PATCH TRANSDERMAL at 09:22

## 2019-02-05 RX ADMIN — METOPROLOL TARTRATE SCH MG: 50 TABLET, FILM COATED ORAL at 09:22

## 2019-02-05 RX ADMIN — IPRATROPIUM BROMIDE AND ALBUTEROL SULFATE SCH AMPUL: .5; 3 SOLUTION RESPIRATORY (INHALATION) at 07:20

## 2019-02-05 RX ADMIN — IPRATROPIUM BROMIDE AND ALBUTEROL SULFATE SCH AMPUL: .5; 3 SOLUTION RESPIRATORY (INHALATION) at 20:15

## 2019-02-05 RX ADMIN — Medication SCH ML: at 09:22

## 2019-02-05 RX ADMIN — IPRATROPIUM BROMIDE AND ALBUTEROL SULFATE SCH AMPUL: .5; 3 SOLUTION RESPIRATORY (INHALATION) at 13:24

## 2019-02-05 RX ADMIN — BENZONATATE SCH MG: 100 CAPSULE ORAL at 14:09

## 2019-02-05 NOTE — PROGRESS NOTE
Assessment and Plan


Assessment and plan: 


Patient is a Patient is a 43-year-old man with a history of hypertension, 

dyslipidemia, morbid obesity and Tobacco dependency who presents this morning to

[facility] ED with SOB x 2 days.





* pCXR reported no acute cardiopulmonary process





Suspected Acute hypoxic respiratory failure, poa on VM 50%, EMS reported a low 

pulse ox of 88% RA: continue o2 therapy and nebs, consulted Pulmonology


Acute bronchitis: treat with abx and nebs, utility of steroids


Hyperkalemia; Kayexalate.  Follow electrolytes


Moderate to severe Hyponatremia, hypovolemia: treat with ivf, repeat in am


Cushingoid appearance and hyponatremia: consulted Nephrology, ?steroids


Chest pain, atypical, related to coughing most likely, father  of MI though:

Cardiac enzymes, stress test negative


Morbid obesity, bmi 49.5;                                       


Tobacco dependency (~2 ppd):  on stopping, offer and ordered nicotine 

patch


DVT prophylaxis: sq lovenox











History


Interval history: 


Patient seen and examined medical records reviewed


No new Events reported by the nursing staff


Admitted with acute on chronic respiratory failure


COPD exacerbation


Patient feels slightly better


Vital signs reviewed








Hospitalist Physical





- Constitutional


Vitals: 


                                        











Temp Pulse Resp BP Pulse Ox


 


 98.7 F   88   16   140/78   94 


 


 19 05:02  19 09:22  19 07:27  19 09:22  19 08:47











General appearance: Present: mild distress, well-nourished, obese (morbidly 

obese)





- EENT


Eyes: Present: PERRL, EOM intact





- Neck


Neck: Present: supple, normal ROM





- Respiratory


Respiratory effort: labored


Respiratory: bilateral: diminished, rhonchi, negative: rales, wheezing





- Cardiovascular


Rhythm: regular


Heart Sounds: Present: S1 & S2





- Extremities


Extremities: no ischemia


Extremity abnormal: edema





- Abdominal


General gastrointestinal: soft, non-tender, non-distended, normal bowel sounds





- Integumentary


Integumentary: Present: clear, warm





- Psychiatric


Psychiatric: appropriate mood/affect, cooperative





- Neurologic


Neurologic: moves all extremities





Results





- Labs


CBC & Chem 7: 


                                 19 04:28





                                 19 04:28


Labs: 


                             Laboratory Last Values











WBC  14.5 K/mm3 (4.5-11.0)  H  19  04:28    


 


RBC  4.96 M/mm3 (3.65-5.03)   19  04:28    


 


Hgb  15.9 gm/dl (11.8-15.2)  H  19  04:28    


 


Hct  48.0 % (35.5-45.6)  H  19  04:28    


 


MCV  97 fl (84-94)  H  19  04:28    


 


MCH  32 pg (28-32)   19  04:28    


 


MCHC  33 % (32-34)   19  04:28    


 


RDW  14.5 % (13.2-15.2)   19  04:28    


 


Plt Count  229 K/mm3 (140-440)   19  04:28    


 


Lymph % (Auto)  28.8 % (13.4-35.0)   19  03:38    


 


Mono % (Auto)  9.2 % (0.0-7.3)  H  19  03:38    


 


Eos % (Auto)  2.9 % (0.0-4.3)   19  03:38    


 


Baso % (Auto)  0.9 % (0.0-1.8)   19  03:38    


 


Lymph #  2.0 K/mm3 (1.2-5.4)   19  03:38    


 


Mono #  0.6 K/mm3 (0.0-0.8)   19  03:38    


 


Eos #  0.2 K/mm3 (0.0-0.4)   19  03:38    


 


Baso #  0.1 K/mm3 (0.0-0.1)   19  03:38    


 


Add Manual Diff  Complete   19  04:28    


 


Total Counted  100   19  04:28    


 


Seg Neutrophils %  Np   19  04:28    


 


Seg Neuts % (Manual)  87.0 % (40.0-70.0)  H  19  04:28    


 


Band Neutrophils %  3.0 %  19  04:28    


 


Lymphocytes % (Manual)  6.0 % (13.4-35.0)  L  19  04:28    


 


Reactive Lymphs % (Man)  0 %  19  04:28    


 


Monocytes % (Manual)  4.0 % (0.0-7.3)   19  04:28    


 


Eosinophils % (Manual)  0 % (0.0-4.3)   19  04:28    


 


Basophils % (Manual)  0 % (0.0-1.8)   19  04:28    


 


Metamyelocytes %  0 %  19  04:28    


 


Myelocytes %  0 %  19  04:28    


 


Promyelocytes %  0 %  19  04:28    


 


Blast Cells %  0 %  19  04:28    


 


Nucleated RBC %  Not Reportable   19  04:28    


 


Seg Neutrophils #  4.0 K/mm3 (1.8-7.7)   19  03:38    


 


Seg Neutrophils # Man  12.6 K/mm3 (1.8-7.7)  H  19  04:28    


 


Band Neutrophils #  0.4 K/mm3  19  04:28    


 


Lymphocytes # (Manual)  0.9 K/mm3 (1.2-5.4)  L  19  04:28    


 


Abs React Lymphs (Man)  0.0 K/mm3  19  04:28    


 


Monocytes # (Manual)  0.6 K/mm3 (0.0-0.8)   19  04:28    


 


Eosinophils # (Manual)  0.0 K/mm3 (0.0-0.4)   19  04:28    


 


Basophils # (Manual)  0.0 K/mm3 (0.0-0.1)   19  04:28    


 


Metamyelocytes #  0.0 K/mm3  19  04:28    


 


Myelocytes #  0.0 K/mm3  19  04:28    


 


Promyelocytes #  0.0 K/mm3  19  04:28    


 


Blast Cells #  0.0 K/mm3  19  04:28    


 


WBC Morphology  Not Reportable   19  04:28    


 


Hypersegmented Neuts  Not Reportable   19  04:28    


 


Hyposegmented Neuts  Not Reportable   19  04:28    


 


Hypogranular Neuts  Not Reportable   19  04:28    


 


Smudge Cells  Not Reportable   19  04:28    


 


Toxic Granulation  Not Reportable   19  04:28    


 


Toxic Vacuolation  Not Reportable   19  04:28    


 


Dohle Bodies  Not Reportable   19  04:28    


 


Pelger-Huet Anomaly  Not Reportable   19  04:28    


 


Murtaza Rods  Not Reportable   19  04:28    


 


Platelet Estimate  Consistent w auto   19  04:28    


 


Clumped Platelets  Not Reportable   19  04:28    


 


Plt Clumps, EDTA  Not Reportable   19  04:28    


 


Large Platelets  Not Reportable   19  04:28    


 


Giant Platelets  Not Reportable   19  04:28    


 


Platelet Satelliting  Not Reportable   19  04:28    


 


Plt Morphology Comment  Not Reportable   19  04:28    


 


RBC Morphology  Not Reportable   19  04:28    


 


Dimorphic RBCs  Not Reportable   19  04:28    


 


Polychromasia  Not Reportable   19  04:28    


 


Hypochromasia  Not Reportable   19  04:28    


 


Poikilocytosis  Not Reportable   19  04:28    


 


Anisocytosis  1+   19  04:28    


 


Microcytosis  Not Reportable   19  04:28    


 


Macrocytosis  Not Reportable   19  04:28    


 


Spherocytes  Not Reportable   19  04:28    


 


Pappenheimer Bodies  Not Reportable   19  04:28    


 


Sickle Cells  Not Reportable   19  04:28    


 


Target Cells  Not Reportable   19  04:28    


 


Tear Drop Cells  Not Reportable   19  04:28    


 


Ovalocytes  Not Reportable   19  04:28    


 


Helmet Cells  Not Reportable   19  04:28    


 


[place] Bodies  Not Reportable   19  04:28    


 


[place] Rings  Not Reportable   19  04:28    


 


[place] Cells  Not Reportable   19  04:28    


 


Bite Cells  Not Reportable   19  04:28    


 


Crenated Cell  Not Reportable   19  04:28    


 


Elliptocytes  Not Reportable   19  04:28    


 


Acanthocytes (Spur)  Not Reportable   19  04:28    


 


Rouleaux  Not Reportable   19  04:28    


 


Hemoglobin C Crystals  Not Reportable   19  04:28    


 


Schistocytes  Not Reportable   19  04:28    


 


Malaria parasites  Not Reportable   19  04:28    


 


[place] Bodies  Not Reportable   19  04:28    


 


Hem Pathologist Commnt  No   19  04:28    


 


PT  13.4 Sec. (12.2-14.9)   19  03:38    


 


INR  0.98  (0.87-1.13)   19  03:38    


 


Sodium  135 mmol/L (137-145)  L D 19  04:28    


 


Potassium  5.4 mmol/L (3.6-5.0)  H D 19  04:28    


 


Chloride  96.6 mmol/L ()  L  19  04:28    


 


Carbon Dioxide  28 mmol/L (22-30)   19  04:28    


 


Anion Gap  16 mmol/L  19  04:28    


 


BUN  8 mg/dL (9-20)  L  19  04:28    


 


Creatinine  0.5 mg/dL (0.8-1.5)  L  19  04:28    


 


Estimated GFR  > 60 ml/min  19  04:28    


 


BUN/Creatinine Ratio  16 %  19  04:28    


 


Glucose  148 mg/dL ()  H  19  04:28    


 


Uric Acid  5.5 mg/dL (3.5-7.6)   19  10:07    


 


Calcium  9.2 mg/dL (8.4-10.2)   19  04:28    


 


Total Creatine Kinase  218 units/L ()  H  19  22:45    


 


CK-MB (CK-2)  5.1 ng/mL (0.0-4.0)  H  19  22:45    


 


CK-MB (CK-2) Rel Index  2.3  (0-4)   19  22:45    


 


Troponin T  < 0.010 ng/mL (0.00-0.029)   19  22:45    


 


NT-Pro-B Natriuret Pep  124.8 pg/mL (0-450)   19  03:38    


 


TSH  0.883 mlU/mL (0.270-4.200)   19  10:07    


 


Urine Osmolality  589 Mosm/kg  19  21:52    


 


Urine Sodium  80 mmol/L  19  21:52

## 2019-02-05 NOTE — PROGRESS NOTE
Assessment and Plan


Impression


* Hyponatremia.  Patient is clinically euvolemic


* Shortness of breath.  Most likely secondary to COPD


* Chest pain


* Hypertension


* Obesity


* Hyperkalemia


Recommendation


* Hyponatremia is much improved.  


* Urine studies seems to be consistent with  SIADH.


* Discontinue IV saline.  Continue free water restriction 


* Administer Kayexalate.  Cortisol level would not be useful as patient is 

  already on intravenous steroids 


* Avoid hydrochlorothiazide and other medications that can aggravate 

  hyponatremia


* Monitor fluid status and electrolytes closely











Subjective


Date of service: 02/05/19


Interval history: 


Patient feels better today.  Shortness of breath is improving.  Denies any 

nausea or vomiting.  No more chest pain








Objective





- Vital Signs


Vital signs: 


                               Vital Signs - 12hr











  02/04/19 02/04/19 02/05/19





  21:00 22:51 03:12


 


Temperature  98.8 F 


 


Pulse Rate  94 H 


 


Pulse Rate [ 92 H  





Anterior   





Bilateral   





Throughout]   


 


Respiratory  20 





Rate   


 


Respiratory 18  





Rate [Anterior   





Bilateral   





Throughout]   


 


Blood Pressure  165/87 


 


O2 Sat by Pulse  95 96





Oximetry   














  02/05/19 02/05/19 02/05/19





  05:02 07:20 07:27


 


Temperature 98.7 F  


 


Pulse Rate 88  


 


Pulse Rate [  95 H 84





Anterior   





Bilateral   





Throughout]   


 


Respiratory 20  





Rate   


 


Respiratory  18 16





Rate [Anterior   





Bilateral   





Throughout]   


 


Blood Pressure 149/72  


 


O2 Sat by Pulse 87  





Oximetry   














  02/05/19





  08:47


 


Temperature 


 


Pulse Rate 


 


Pulse Rate [ 





Anterior 





Bilateral 





Throughout] 


 


Respiratory 





Rate 


 


Respiratory 





Rate [Anterior 





Bilateral 





Throughout] 


 


Blood Pressure 


 


O2 Sat by Pulse 94





Oximetry 














- General Appearance


General appearance: well-developed, well-nourished, appears stated age, obese


EENT: PERRL, mucous membranes moist


Neck: no JVD, no thyromegaly, no carotid bruit, supple


Respiratory: Present: Ronchi (bilateral scattered rhonchi)


Cardiology: regular, normal heart rate, S1S2, no murmurs


Gastrointestinal: normal, normoactive bowel sounds


Integumentary: no rash, other (no edema)





- Lab





                                 02/05/19 04:28





                                 02/05/19 04:28


                             Most recent lab results











Calcium  9.2 mg/dL (8.4-10.2)   02/05/19  04:28    


 


Urine Sodium  80 mmol/L  02/04/19  21:52    














Medications & Allergies





- Medications


Allergies/Adverse Reactions: 


                                    Allergies





Iodinated Contrast- Oral and IV Dye Allergy (Verified 07/08/14 21:15)


   Rash








Home Medications: 


                                Home Medications











 Medication  Instructions  Recorded  Confirmed  Last Taken  Type


 


Aspirin [Aspirin TAB] 325 mg PO QDAY #30 tablet 07/10/14 02/04/19 Unknown Rx


 


Famotidine [Pepcid] 20 mg PO BID #60 tablet 07/10/14 02/04/19 Unknown Rx


 


Lisinopril [Zestril TAB] 10 mg PO QDAY #30 tablet 07/10/14 02/04/19 02/03/19 Rx


 


Metoprolol [Lopressor TAB] 50 mg PO BID #60 tablet 07/10/14 02/04/19 02/03/19 Rx


 


Pravastatin Sodium [Pravastatin] 10 mg PO QHS #30 tablet 07/10/14 02/04/19 

Unknown Rx


 


amLODIPine [Norvasc] 5 mg PO QDAY #30 tablet 07/10/14 02/04/19 Unknown Rx











Active Medications: 














Generic Name Dose Route Start Last Admin





  Trade Name Freq  PRN Reason Stop Dose Admin


 


Acetaminophen  650 mg  02/04/19 06:16  





  Tylenol  PO   





  Q4H PRN   





  Pain MILD(1-3)/Fever >100.5/HA   





     





     





     


 


Albuterol  2.5 mg  02/05/19 03:20  





  Proventil  IH   





  Q4HRT PRN   





  Shortness Of Breath   





     





     





     


 


Albuterol/Ipratropium  1 ampul  02/04/19 08:00  02/05/19 07:20





  Duoneb *Not For Prn Use*  IH   1 ampul





  Q6HRT MING   Administration





     





     





     





     


 


Benzonatate  100 mg  02/04/19 08:00  02/05/19 05:55





  Tessalon Perles  PO   100 mg





  Q8HR MING   Administration





     





     





     





     


 


Enoxaparin Sodium  40 mg  02/04/19 10:00  02/04/19 12:04





  Lovenox  SUB-Q   40 mg





  QDAY@1000 MING   Administration





     





     





     





     


 


Sodium Chloride  1,000 mls @ 75 mls/hr  02/04/19 07:00  02/05/19 05:55





  Nacl 0.9% 1000 Ml  IV   75 mls/hr





  AS DIRECT MING   Administration





     





     





     





     


 


Methylprednisolone Sodium Succinate  40 mg  02/04/19 12:00  02/05/19 03:30





  Solu-Medrol  IV   40 mg





  Q8H MING   Administration





     





     





     





     


 


Metoprolol Tartrate  50 mg  02/04/19 18:00  02/04/19 21:53





  Lopressor  PO   50 mg





  BID MING   Administration





     





     





     





     


 


Nicotine  21 mg  02/04/19 10:00  02/04/19 12:03





  Habitrol  TD   21 mg





  QDAY MING   Administration





     





     





     





     


 


Ondansetron HCl  4 mg  02/04/19 06:16  





  Zofran  IV   





  Q8H PRN   





  Nausea And Vomiting   





     





     





     


 


Sodium Chloride  10 ml  02/04/19 10:00  02/04/19 21:54





  Sodium Chloride Flush Syringe 10 Ml  IV   10 ml





  BID MIGN   Administration





     





     





     





     


 


Sodium Chloride  10 ml  02/04/19 06:16  





  Sodium Chloride Flush Syringe 10 Ml  IV   





  PRN PRN   





  LINE FLUSH

## 2019-02-05 NOTE — PROGRESS NOTE
Assessment and Plan





COPD with exacerbation.Controlled


Acute exacerbation of chronic bronchitis


Chest pain.  Under evaluation by cardiology


Hyponatremia.  Nephrology following, SIADH suspected


Suspected SDB/LORE


Morbid obesity


Hypertension


Hyperkalemia








Recommendations





Complete cardiac evaluation for CAD, atypical chest pain


Albuterol 2.5 milligram nebulizations every 4-6 hours with or without 

ipratropium


Consider 5 days of Rocephin or suitable equivalent


Ambulate on r/a davi Oxygen support evaluation


PO Prednisone 40 mg with 5-7 days taper down


Smoking cessation discussed in detail.


OPD f/u PFT and PSG for COPD and  LORE evaluation.


DVT prophylaxis











Findings were discussed with the patient in detail.  All questions answered.  

Smoking cessation was strongly encouraged, initial intervention








Thanks





Subjective


Date of service: 02/05/19


Principal diagnosis: COPD exacerbation,LORE,hyponatremia


Interval history: 





No SOB nor wheezing. Denies chest pain





Objective


                               Vital Signs - 12hr











  02/05/19 02/05/19 02/05/19





  03:12 05:02 07:20


 


Temperature  98.7 F 


 


Pulse Rate  88 


 


Pulse Rate [   95 H





Anterior   





Bilateral   





Throughout]   


 


Respiratory  20 





Rate   


 


Respiratory   18





Rate [Anterior   





Bilateral   





Throughout]   


 


Blood Pressure  149/72 


 


O2 Sat by Pulse 96 87 





Oximetry   














  02/05/19 02/05/19 02/05/19





  07:27 08:47 09:22


 


Temperature   


 


Pulse Rate   88


 


Pulse Rate [ 84  





Anterior   





Bilateral   





Throughout]   


 


Respiratory   





Rate   


 


Respiratory 16  





Rate [Anterior   





Bilateral   





Throughout]   


 


Blood Pressure   140/78


 


O2 Sat by Pulse  94 





Oximetry   











Constitutional: no acute distress, other (morbidly obese,plethoric fascies)


Eyes: non-icteric


ENT: oropharynx moist, other (Mallampati 3-4)


Neck: supple, no JVD


Ascultation: Bilateral: clear, diminished breath sounds


Cardiovascular: regular rate and rhythm


Gastrointestinal: normoactive bowel sounds, non-distended


Integumentary: normal


Extremities: no cyanosis, no edema


Neurologic: normal mental status, non-focal exam, pupils equal and round, CN II-

XII normal, motor strength normal and


Psychiatric: mood appropriate, affect normal


CBC and BMP: 


                                 02/05/19 04:28





                                 02/05/19 04:28


ABG, PT/INR, D-dimer: 


PT/INR, D-dimer











PT  13.4 Sec. (12.2-14.9)   02/04/19  03:38    


 


INR  0.98  (0.87-1.13)   02/04/19  03:38    








Abnormal lab findings: 


                                  Abnormal Labs











  02/04/19 02/04/19 02/04/19





  03:38 03:38 03:38


 


WBC   


 


Hgb  15.7 H  


 


Hct  46.0 H  


 


MCV  95 H  


 


Mono % (Auto)  9.2 H  


 


Seg Neuts % (Manual)   


 


Lymphocytes % (Manual)   


 


Seg Neutrophils # Man   


 


Lymphocytes # (Manual)   


 


Sodium    128 L


 


Potassium   


 


Chloride    90.9 L


 


BUN    8 L


 


Creatinine    0.7 L


 


Glucose    124 H


 


Total Creatine Kinase   267 H 


 


CK-MB (CK-2)   7.0 H 














  02/04/19 02/04/19 02/04/19





  06:36 10:07 14:54


 


WBC   


 


Hgb   


 


Hct   


 


MCV   


 


Mono % (Auto)   


 


Seg Neuts % (Manual)   


 


Lymphocytes % (Manual)   


 


Seg Neutrophils # Man   


 


Lymphocytes # (Manual)   


 


Sodium   


 


Potassium   


 


Chloride   


 


BUN   


 


Creatinine   


 


Glucose   


 


Total Creatine Kinase  262 H  266 H  241 H


 


CK-MB (CK-2)  6.2 H  6.7 H  6.4 H














  02/04/19 02/04/19 02/05/19





  17:19 22:45 04:28


 


WBC    14.5 H


 


Hgb    15.9 H


 


Hct    48.0 H


 


MCV    97 H


 


Mono % (Auto)   


 


Seg Neuts % (Manual)    87.0 H


 


Lymphocytes % (Manual)    6.0 L


 


Seg Neutrophils # Man    12.6 H


 


Lymphocytes # (Manual)    0.9 L


 


Sodium   


 


Potassium   


 


Chloride   


 


BUN   


 


Creatinine   


 


Glucose   


 


Total Creatine Kinase  235 H  218 H 


 


CK-MB (CK-2)  6.2 H  5.1 H 














  02/05/19





  04:28


 


WBC 


 


Hgb 


 


Hct 


 


MCV 


 


Mono % (Auto) 


 


Seg Neuts % (Manual) 


 


Lymphocytes % (Manual) 


 


Seg Neutrophils # Man 


 


Lymphocytes # (Manual) 


 


Sodium  135 L D


 


Potassium  5.4 H D


 


Chloride  96.6 L


 


BUN  8 L


 


Creatinine  0.5 L


 


Glucose  148 H


 


Total Creatine Kinase 


 


CK-MB (CK-2)

## 2019-02-06 VITALS — SYSTOLIC BLOOD PRESSURE: 157 MMHG | DIASTOLIC BLOOD PRESSURE: 86 MMHG

## 2019-02-06 LAB
BASOPHILS # (AUTO): 0 K/MM3 (ref 0–0.1)
BASOPHILS NFR BLD AUTO: 0.2 % (ref 0–1.8)
BUN SERPL-MCNC: 12 MG/DL (ref 9–20)
BUN/CREAT SERPL: 20 %
CALCIUM SERPL-MCNC: 9.2 MG/DL (ref 8.4–10.2)
EOSINOPHIL # BLD AUTO: 0 K/MM3 (ref 0–0.4)
EOSINOPHIL NFR BLD AUTO: 0.1 % (ref 0–4.3)
HCT VFR BLD CALC: 46.3 % (ref 35.5–45.6)
HEMOLYSIS INDEX: 8
HGB BLD-MCNC: 15 GM/DL (ref 11.8–15.2)
LYMPHOCYTES # BLD AUTO: 0.7 K/MM3 (ref 1.2–5.4)
LYMPHOCYTES NFR BLD AUTO: 6.6 % (ref 13.4–35)
MCHC RBC AUTO-ENTMCNC: 33 % (ref 32–34)
MCV RBC AUTO: 96 FL (ref 84–94)
MONOCYTES # (AUTO): 0.6 K/MM3 (ref 0–0.8)
MONOCYTES % (AUTO): 5.5 % (ref 0–7.3)
PLATELET # BLD: 241 K/MM3 (ref 140–440)
RBC # BLD AUTO: 4.8 M/MM3 (ref 3.65–5.03)

## 2019-02-06 RX ADMIN — IPRATROPIUM BROMIDE AND ALBUTEROL SULFATE SCH AMPUL: .5; 3 SOLUTION RESPIRATORY (INHALATION) at 07:31

## 2019-02-06 RX ADMIN — ENOXAPARIN SODIUM SCH MG: 100 INJECTION SUBCUTANEOUS at 09:01

## 2019-02-06 RX ADMIN — METOPROLOL TARTRATE SCH MG: 50 TABLET, FILM COATED ORAL at 09:01

## 2019-02-06 RX ADMIN — IPRATROPIUM BROMIDE AND ALBUTEROL SULFATE SCH AMPUL: .5; 3 SOLUTION RESPIRATORY (INHALATION) at 02:39

## 2019-02-06 RX ADMIN — BENZONATATE SCH MG: 100 CAPSULE ORAL at 13:04

## 2019-02-06 RX ADMIN — IPRATROPIUM BROMIDE AND ALBUTEROL SULFATE SCH: .5; 3 SOLUTION RESPIRATORY (INHALATION) at 13:30

## 2019-02-06 RX ADMIN — BENZONATATE SCH MG: 100 CAPSULE ORAL at 05:01

## 2019-02-06 RX ADMIN — Medication SCH ML: at 09:02

## 2019-02-06 RX ADMIN — NICOTINE SCH MG: 21 PATCH TRANSDERMAL at 09:01

## 2019-02-06 NOTE — DISCHARGE SUMMARY
Providers





- Providers


Date of Admission: 


02/04/19 06:16





Date of discharge: 02/06/19


Attending physician: 


AMY J KOCHERLA





                                        





02/04/19 07:41


Consult to Physician [CONS] Routine 


   Comment: 


   Consulting Provider: TIMBO CRANE


   Physician Instructions: 


   Reason For Exam: respiratory failure





02/04/19 07:44


Consult to Physician [CONS] Routine 


   Comment: 


   Consulting Provider: WILLAM SAGE


   Physician Instructions: 


   Reason For Exam: hyponatremia, Cushingoid appearance











Primary care physician: 


CLAUDIO MARES








Hospitalization


Reason for admission: worsening shortness of breath, acute hypoxic respiratory 

failure


Condition: Stable


Pertinent studies: 


Stress test; negative for reversible ischemia


EF 70%


Chest x-ray; no acute abnormality noted


Hospital course: 


Patient is a Patient is a 43-year-old man with a history of hypertension, 

dyslipidemia, morbid obesity and Tobacco dependency 


was admitted through [facility] ED with SOB x 2 days.,  Patient was initially 

evaluated noted to be in acute on chronic hypoxic 


respiratory failure requiring oxygen, Ventimask, Patient was admitted and man

aged for acute bronchitis, acute exacerbation of COPD


Patient was evaluated by pulmonary, managed with nebulizers and IV steroids 

inhalation steroids and supportive care


Electrolyte imbalances were corrected, patient's room air resting and ambulatory

 O2 sats more than 95%,No indication for home oxygen


Today patient is comfortable in no new complaints, vital signs stable


The patient is hemodynamically and clinically stable at discharge


Smoking cessation counseling done, advised nicotine patch





--Discharge diagnosis and management;


-- Acute hypoxic respiratory failure, poa on VM 50%, EMS reported a low pulse ox

 of 88% RA: 


   continue o2 therapy and nebs, Pulmonology evaluated, tapering dose of 

steroids, symptoms improved


--Acute bronchitis: Managed with bronchodilators, tapering dose of steroids


--Hyperkalemia; Kayexalate.  Resolved


--Moderate to severe Hyponatremia, hypovolemia: Corrected


--Atypical Chest pain, Cardiac enzymes, stress test negative  


--Morbid obesity, bmi 49.5;  advised weight reduction when stable               

       


--Tobacco dependency (~2 ppd):  smoking cessation, nicotine patch as 

needed





Patient is stable at discharge


Patient needs outpatient pulmonary function tests, sleep study to rule out 

obstructive sleep apnea


Advised to follow private pulmonologist per schedule


Disposition: DC-01 TO HOME OR SELFCARE


Time spent for discharge: 32 min





Core Measure Documentation





- Palliative Care


Palliative Care/ Comfort Measures: Not Applicable





- Core Measures


Any of the following diagnoses?: none





Exam





- Constitutional


Vitals: 


                                        











Temp Pulse Resp BP Pulse Ox


 


 97.5 F L  99 H  18   132/71   96 


 


 02/06/19 05:49  02/06/19 07:31  02/06/19 07:31  02/06/19 05:49  02/06/19 07:32











General appearance: Present: no acute distress, well-nourished, obese (morbidly 

obese)





- EENT


Eyes: Present: PERRL, EOM intact





- Neck


Neck: Present: supple, normal ROM





- Respiratory


Respiratory effort: normal


Respiratory: bilateral: diminished, negative: rales, rhonchi, wheezing





- Cardiovascular


Rhythm: regular


Heart Sounds: Present: S1 & S2





- Extremities


Extremities: no ischemia, No edema





- Abdominal


General gastrointestinal: Present: soft, non-tender, non-distended





- Integumentary


Integumentary: Present: clear, warm





- Musculoskeletal


Musculoskeletal: strength equal bilaterally





- Psychiatric


Psychiatric: appropriate mood/affect, cooperative





- Neurologic


Neurologic: CNII-XII intact, moves all extremities





Plan


Activity: no restrictions


Diet: low salt


Special Instructions: smoking cessation


Additional Instructions: Advise smoking cessation, nicotine patch as needed.  

Advise dietary modification, exercise as tolerated, weight reduction when 

medically stable.  If you have shortness of breath or cough, contact M.D. or go 

to emergency room.  Follow private pulmonologist for outpatient pulmonary 

function tests/sleep study to rule out obstructive sleep apnea


Follow up with: 


CLAUDIO MARES [Primary Care Provider] - 7 Days


REYES-BELTRAN,ANTONIO, MD [Staff Physician] - 7 Days


Prescriptions: 


ALBUTEROL Inhaler(NF) [VENTOLIN Inhaler(NF)] 2 puff IH QID PRN #1 inha


 PRN Reason: Shortness Of Breath


Benzonatate [Tessalon Perles] 100 mg PO Q8HR #30 capsule


Nicotine [Habitrol] 21 mg TD QDAY #30 patch


Prednisone [predniSONE 10 mg (6-Day Pack, 21 Tabs)] 10 mg PO .TAPER #1 tab.ds.pk

## 2019-02-06 NOTE — PROGRESS NOTE
Assessment and Plan


Impression


* Hyponatremia.  Patient is clinically euvolemic


* Shortness of breath.  Most likely secondary to COPD


* Chest pain


* Hypertension


* Obesity


* Hyperkalemia


Recommendation


* Hyponatremia is much improved.  


* Urine studies seems to be consistent with  SIADH.


* Serum sodium is better today at 137.   Continue free water restriction 


* Hyperkalemia has been corrected .  Cortisol level would not be useful as 

  patient is already on steroids 


* Avoid hydrochlorothiazide and other medications that can aggravate 

  hyponatremia


* Okay to discharge patient home from renal standpoint











Subjective


Date of service: 02/06/19


Principal diagnosis: COPD exacerbation,LORE,hyponatremia


Interval history: 


Patient is comfortable.  Getting ready to go home.  Shortness of breath 

improved.








Objective





- Vital Signs


Vital signs: 


                               Vital Signs - 12hr











  02/06/19 02/06/19 02/06/19





  00:57 02:30 02:40


 


Temperature 98.5 F  


 


Pulse Rate 88  


 


Pulse Rate [  82 85





Bilateral]   


 


Respiratory 18  





Rate   


 


Respiratory  16 16





Rate [Bilateral   





]   


 


Blood Pressure 149/71  


 


O2 Sat by Pulse 96  





Oximetry   














  02/06/19 02/06/19 02/06/19





  05:49 07:31 07:32


 


Temperature 97.5 F L  


 


Pulse Rate 89  


 


Pulse Rate [  99 H 





Bilateral]   


 


Respiratory 18  





Rate   


 


Respiratory  18 





Rate [Bilateral   





]   


 


Blood Pressure 132/71  


 


O2 Sat by Pulse 92  96





Oximetry   














  02/06/19





  11:47


 


Temperature 98.2 F


 


Pulse Rate 81


 


Pulse Rate [ 





Bilateral] 


 


Respiratory 22





Rate 


 


Respiratory 





Rate [Bilateral 





] 


 


Blood Pressure 169/92


 


O2 Sat by Pulse 93





Oximetry 














- General Appearance


General appearance: well-developed, well-nourished, appears stated age


EENT: PERRL, mucous membranes moist


Neck: no JVD, no thyromegaly, no carotid bruit, supple


Respiratory: Present: Clear to Ascultation


Cardiology: regular, normal heart rate, S1S2, no murmurs


Gastrointestinal: normal, normoactive bowel sounds


Integumentary: other (no edema)





- Lab





                                 02/06/19 05:25





                                 02/06/19 05:25


                             Most recent lab results











Calcium  9.2 mg/dL (8.4-10.2)   02/06/19  05:25    


 


Magnesium  2.10 mg/dL (1.7-2.3)   02/06/19  05:25    


 


Urine Sodium  80 mmol/L  02/04/19  21:52    














Medications & Allergies





- Medications


Allergies/Adverse Reactions: 


                                    Allergies





Iodinated Contrast- Oral and IV Dye Allergy (Verified 07/08/14 21:15)


   Rash








Home Medications: 


                                Home Medications











 Medication  Instructions  Recorded  Confirmed  Last Taken  Type


 


Aspirin [Aspirin TAB] 325 mg PO QDAY #30 tablet 07/10/14 02/04/19 Unknown Rx


 


Famotidine [Pepcid] 20 mg PO BID #60 tablet 07/10/14 02/04/19 Unknown Rx


 


Lisinopril [Zestril TAB] 10 mg PO QDAY #30 tablet 07/10/14 02/04/19 02/03/19 Rx


 


Pravastatin Sodium [Pravastatin] 10 mg PO QHS #30 tablet 07/10/14 02/04/19 

Unknown Rx


 


amLODIPine [Norvasc] 5 mg PO QDAY #30 tablet 07/10/14 02/04/19 Unknown Rx


 


ALBUTEROL Inhaler(NF) [VENTOLIN 2 puff IH QID PRN #1 inha 02/06/19  Unknown Rx





Inhaler(NF)]     


 


Benzonatate [Tessalon Perles] 100 mg PO Q8HR #30 capsule 02/06/19  Unknown Rx


 


Nicotine [Habitrol] 21 mg TD QDAY #30 patch 02/06/19  Unknown Rx


 


Prednisone [predniSONE 10 mg 10 mg PO .TAPER #1 tab.ds.pk 02/06/19  Unknown Rx





(6-Day Pack, 21 Tabs)]     











Active Medications: 














Generic Name Dose Route Start Last Admin





  Trade Name Freq  PRN Reason Stop Dose Admin


 


Acetaminophen  650 mg  02/04/19 06:16  





  Tylenol  PO   





  Q4H PRN   





  Pain MILD(1-3)/Fever >100.5/HA   





     





     





     


 


Albuterol  2.5 mg  02/05/19 03:20  





  Proventil  IH   





  Q4HRT PRN   





  Shortness Of Breath   





     





     





     


 


Albuterol/Ipratropium  1 ampul  02/04/19 08:00  02/06/19 07:31





  Duoneb *Not For Prn Use*  IH   1 ampul





  Q6HRT MING   Administration





     





     





     





     


 


Benzonatate  100 mg  02/04/19 08:00  02/06/19 05:01





  Tessalon Perles  PO   100 mg





  Q8HR MING   Administration





     





     





     





     


 


Enoxaparin Sodium  40 mg  02/04/19 10:00  02/06/19 09:01





  Lovenox  SUB-Q   40 mg





  QDAY@1000 MING   Administration





     





     





     





     


 


Methylprednisolone Sodium Succinate  40 mg  02/04/19 12:00  02/06/19 11:26





  Solu-Medrol  IV   40 mg





  Q8H MING   Administration





     





     





     





     


 


Metoprolol Tartrate  50 mg  02/04/19 18:00  02/06/19 09:01





  Lopressor  PO   50 mg





  BID MING   Administration





     





     





     





     


 


Nicotine  21 mg  02/04/19 10:00  02/06/19 09:01





  Habitrol  TD   21 mg





  QDAY MING   Administration





     





     





     





     


 


Ondansetron HCl  4 mg  02/04/19 06:16  





  Zofran  IV   





  Q8H PRN   





  Nausea And Vomiting   





     





     





     


 


Sodium Chloride  10 ml  02/04/19 10:00  02/06/19 09:02





  Sodium Chloride Flush Syringe 10 Ml  IV   10 ml





  BID MING   Administration





     





     





     





     


 


Sodium Chloride  10 ml  02/04/19 06:16  





  Sodium Chloride Flush Syringe 10 Ml  IV   





  PRN PRN   





  LINE FLUSH

## 2019-02-06 NOTE — PROGRESS NOTE
Assessment and Plan





COPD with exacerbation.Controlled


Acute exacerbation of chronic bronchitis.Controlled


Chest pain.  Under evaluation by cardiology.Resolved


Hyponatremia.  Nephrology following, SIADH suspected


Suspected SDB/LORE


Morbid obesity


Hypertension


Hyperkalemia








Recommendations





Complete ABX


Ambulate on r/a davi Oxygen support evaluation


PO Prednisone 40 mg with 5-7 days taper down


Smoking cessation discussed in detail.


OPD f/u PFT and PSG for COPD and  LORE evaluation.


DVT prophylaxis


Can go home [id number]-wise











Findings were discussed with the patient in detail.  All questions answered.   








 





Subjective


Date of service: 02/06/19


Principal diagnosis: COPD exacerbation,LORE,hyponatremia


Interval history: 





No chest complains.No SOB or BARRON





Objective


                               Vital Signs - 12hr











  02/06/19 02/06/19 02/06/19





  00:57 02:30 02:40


 


Temperature 98.5 F  


 


Pulse Rate 88  


 


Pulse Rate [  82 85





Bilateral]   


 


Respiratory 18  





Rate   


 


Respiratory  16 16





Rate [Bilateral   





]   


 


Blood Pressure 149/71  


 


O2 Sat by Pulse 96  





Oximetry   














  02/06/19 02/06/19 02/06/19





  05:49 07:31 07:32


 


Temperature 97.5 F L  


 


Pulse Rate 89  


 


Pulse Rate [  99 H 





Bilateral]   


 


Respiratory 18  





Rate   


 


Respiratory  18 





Rate [Bilateral   





]   


 


Blood Pressure 132/71  


 


O2 Sat by Pulse 92  96





Oximetry   











Constitutional: no acute distress, alert, other (morbidly obese,plethoric 

fascies)


Eyes: non-icteric


ENT: oropharynx moist, other (Mallampati 3-4)


Neck: supple, no JVD


Ascultation: Bilateral: clear


Cardiovascular: regular rate and rhythm


Gastrointestinal: normoactive bowel sounds, non-distended


Integumentary: normal


Extremities: no cyanosis, no edema


Neurologic: normal mental status, non-focal exam, pupils equal and round, CN II-

XII normal, motor strength normal and


Psychiatric: mood appropriate, affect normal


CBC and BMP: 


                                 02/06/19 05:25





                                 02/06/19 05:25


ABG, PT/INR, D-dimer: 


PT/INR, D-dimer











PT  13.4 Sec. (12.2-14.9)   02/04/19  03:38    


 


INR  0.98  (0.87-1.13)   02/04/19  03:38    








Abnormal lab findings: 


                                  Abnormal Labs











  02/04/19 02/04/19 02/04/19





  03:38 03:38 03:38


 


WBC   


 


Hgb  15.7 H  


 


Hct  46.0 H  


 


MCV  95 H  


 


Lymph % (Auto)   


 


Mono % (Auto)  9.2 H  


 


Lymph #   


 


Seg Neutrophils %   


 


Seg Neuts % (Manual)   


 


Lymphocytes % (Manual)   


 


Seg Neutrophils #   


 


Seg Neutrophils # Man   


 


Lymphocytes # (Manual)   


 


Sodium    128 L


 


Potassium   


 


Chloride    90.9 L


 


BUN    8 L


 


Creatinine    0.7 L


 


Glucose    124 H


 


Total Creatine Kinase   267 H 


 


CK-MB (CK-2)   7.0 H 














  02/04/19 02/04/19 02/04/19





  06:36 10:07 14:54


 


WBC   


 


Hgb   


 


Hct   


 


MCV   


 


Lymph % (Auto)   


 


Mono % (Auto)   


 


Lymph #   


 


Seg Neutrophils %   


 


Seg Neuts % (Manual)   


 


Lymphocytes % (Manual)   


 


Seg Neutrophils #   


 


Seg Neutrophils # Man   


 


Lymphocytes # (Manual)   


 


Sodium   


 


Potassium   


 


Chloride   


 


BUN   


 


Creatinine   


 


Glucose   


 


Total Creatine Kinase  262 H  266 H  241 H


 


CK-MB (CK-2)  6.2 H  6.7 H  6.4 H














  02/04/19 02/04/19 02/05/19





  17:19 22:45 04:28


 


WBC    14.5 H


 


Hgb    15.9 H


 


Hct    48.0 H


 


MCV    97 H


 


Lymph % (Auto)   


 


Mono % (Auto)   


 


Lymph #   


 


Seg Neutrophils %   


 


Seg Neuts % (Manual)    87.0 H


 


Lymphocytes % (Manual)    6.0 L


 


Seg Neutrophils #   


 


Seg Neutrophils # Man    12.6 H


 


Lymphocytes # (Manual)    0.9 L


 


Sodium   


 


Potassium   


 


Chloride   


 


BUN   


 


Creatinine   


 


Glucose   


 


Total Creatine Kinase  235 H  218 H 


 


CK-MB (CK-2)  6.2 H  5.1 H 














  02/05/19 02/06/19 02/06/19





  04:28 05:25 05:25


 


WBC   


 


Hgb   


 


Hct    46.3 H


 


MCV    96 H


 


Lymph % (Auto)    6.6 L


 


Mono % (Auto)   


 


Lymph #    0.7 L


 


Seg Neutrophils %    87.6 H


 


Seg Neuts % (Manual)   


 


Lymphocytes % (Manual)   


 


Seg Neutrophils #    9.5 H


 


Seg Neutrophils # Man   


 


Lymphocytes # (Manual)   


 


Sodium  135 L D  


 


Potassium  5.4 H D  


 


Chloride  96.6 L  93.6 L 


 


BUN  8 L  


 


Creatinine  0.5 L  0.6 L 


 


Glucose  148 H  142 H 


 


Total Creatine Kinase   


 


CK-MB (CK-2)

## 2020-04-08 ENCOUNTER — HOSPITAL ENCOUNTER (EMERGENCY)
Dept: HOSPITAL 5 - ED | Age: 45
Discharge: HOME | End: 2020-04-08
Payer: OTHER GOVERNMENT

## 2020-04-08 VITALS — SYSTOLIC BLOOD PRESSURE: 146 MMHG | DIASTOLIC BLOOD PRESSURE: 79 MMHG

## 2020-04-08 DIAGNOSIS — I10: ICD-10-CM

## 2020-04-08 DIAGNOSIS — Z87.891: ICD-10-CM

## 2020-04-08 DIAGNOSIS — E66.01: ICD-10-CM

## 2020-04-08 DIAGNOSIS — J44.9: Primary | ICD-10-CM

## 2020-04-08 LAB
ALBUMIN SERPL-MCNC: 4.4 G/DL (ref 3.9–5)
ALT SERPL-CCNC: 29 UNITS/L (ref 7–56)
BASOPHILS # (AUTO): 0.1 K/MM3 (ref 0–0.1)
BASOPHILS NFR BLD AUTO: 0.9 % (ref 0–1.8)
BUN SERPL-MCNC: 8 MG/DL (ref 9–20)
BUN/CREAT SERPL: 13 %
CALCIUM SERPL-MCNC: 9.4 MG/DL (ref 8.4–10.2)
EOSINOPHIL # BLD AUTO: 0.1 K/MM3 (ref 0–0.4)
EOSINOPHIL NFR BLD AUTO: 1.1 % (ref 0–4.3)
HCT VFR BLD CALC: 47.1 % (ref 35.5–45.6)
HEMOLYSIS INDEX: 7
HGB BLD-MCNC: 15.7 GM/DL (ref 11.8–15.2)
INR PPP: 1.03 (ref 0.87–1.13)
LYMPHOCYTES # BLD AUTO: 1.2 K/MM3 (ref 1.2–5.4)
LYMPHOCYTES NFR BLD AUTO: 19.3 % (ref 13.4–35)
MCHC RBC AUTO-ENTMCNC: 33 % (ref 32–34)
MCV RBC AUTO: 94 FL (ref 84–94)
MONOCYTES # (AUTO): 0.4 K/MM3 (ref 0–0.8)
MONOCYTES % (AUTO): 6.7 % (ref 0–7.3)
PLATELET # BLD: 238 K/MM3 (ref 140–440)
RBC # BLD AUTO: 5 M/MM3 (ref 3.65–5.03)

## 2020-04-08 PROCEDURE — 80053 COMPREHEN METABOLIC PANEL: CPT

## 2020-04-08 PROCEDURE — 96365 THER/PROPH/DIAG IV INF INIT: CPT

## 2020-04-08 PROCEDURE — 82550 ASSAY OF CK (CPK): CPT

## 2020-04-08 PROCEDURE — 99285 EMERGENCY DEPT VISIT HI MDM: CPT

## 2020-04-08 PROCEDURE — 83735 ASSAY OF MAGNESIUM: CPT

## 2020-04-08 PROCEDURE — 94644 CONT INHLJ TX 1ST HOUR: CPT

## 2020-04-08 PROCEDURE — 94640 AIRWAY INHALATION TREATMENT: CPT

## 2020-04-08 PROCEDURE — 84484 ASSAY OF TROPONIN QUANT: CPT

## 2020-04-08 PROCEDURE — 85025 COMPLETE CBC W/AUTO DIFF WBC: CPT

## 2020-04-08 PROCEDURE — 71045 X-RAY EXAM CHEST 1 VIEW: CPT

## 2020-04-08 PROCEDURE — 96375 TX/PRO/DX INJ NEW DRUG ADDON: CPT

## 2020-04-08 PROCEDURE — 93010 ELECTROCARDIOGRAM REPORT: CPT

## 2020-04-08 PROCEDURE — 93005 ELECTROCARDIOGRAM TRACING: CPT

## 2020-04-08 PROCEDURE — 85610 PROTHROMBIN TIME: CPT

## 2020-04-08 PROCEDURE — 36415 COLL VENOUS BLD VENIPUNCTURE: CPT

## 2020-04-08 RX ADMIN — ALBUTEROL SULFATE ONE MG: 2.5 SOLUTION RESPIRATORY (INHALATION) at 15:59

## 2020-04-08 RX ADMIN — IPRATROPIUM BROMIDE ONE MG: 0.5 SOLUTION RESPIRATORY (INHALATION) at 15:00

## 2020-04-08 RX ADMIN — ALBUTEROL SULFATE ONE MG: 2.5 SOLUTION RESPIRATORY (INHALATION) at 15:00

## 2020-04-08 RX ADMIN — IPRATROPIUM BROMIDE ONE MG: 0.5 SOLUTION RESPIRATORY (INHALATION) at 15:59

## 2020-04-08 NOTE — XRAY REPORT
CHEST 1 VIEW 



INDICATION: 

Dyspnea.



COMPARISON: 

2/4/2019



FINDINGS:

Support devices: None.



Heart: Within normal limits. 

Pulmonary vasculature: Normal. The right pulmonary artery is prominent due to rotation.

Lungs/Pleura: The lungs are normally expanded and clear. 



Additional findings: None.



IMPRESSION:

1. No acute findings.



Signer Name: Barrington Lara MD 

Signed: 4/8/2020 3:42 PM

Workstation Name: [id number]

## 2020-04-08 NOTE — EMERGENCY DEPARTMENT REPORT
ED Shortness of Breath HPI





- General


Chief Complaint: Dyspnea/Respdistress


Stated Complaint: RESPIRATORY DISTRESS


Time Seen by Provider: 20 14:59


Source: patient, EMS ( EMS documentation not available at time of chart 

dictation ), RN notes reviewed, old records reviewed


Mode of arrival: Stretcher


Limitations: Physical Limitation





- History of Present Illness


Initial Comments: 





Patient is a 45-year-old gentleman.  The patient typically follows with the 

[facility].  He has a history of morbid obesity, sleep 

apnea, electrolyte derangement, atypical chest pain, COPD/reactive airway 

disease.  He was admitted to this hospital in 2019 for electrolyte derangement, 

atypical chest pain, and COPD exacerbation.  He had a negative nuclear stress 

test.





He presents to the ER with EMS with 3 days of cough, chest wall pain, shortness 

of breath.  He feels like this is his prior COPD exacerbation, although more 

intense.  He denies DVT and pulmonary embolism risk factors.  He denies fever, 

confirmed coronavirus sick contacts, but has occasionally left the house.





There is no complaint of headache, neck pain, abdominal pain, extremity weakness

and no numbness, there is no complaint of urinary symptoms.  EMS started the 

patient on CPAP, and gave nebulizer therapy in the field





MD Complaint: shortness of breath, cough, chest pain


-: Gradual, days(s)


Severity: moderate


Consistency: constant


Improves With: oxygen, rest, bronchodilators, upright position, medication


Worsens With: lying flat


Known History Of: COPD


Treatments Prior to Arrival: bronchodilator, NIPPV





- Related Data


Home Oxygen Therapy: No


                                  Previous Rx's











 Medication  Instructions  Recorded  Last Taken  Type


 


Aspirin 325 mg PO QDAY #30 tablet 07/10/14 Unknown Rx


 


Famotidine [Pepcid] 20 mg PO BID #60 tablet 07/10/14 Unknown Rx


 


Pravastatin Sodium [Pravastatin] 10 mg PO QHS #30 tablet 07/10/14 Unknown Rx


 


amLODIPine 5 mg PO QDAY #30 tablet 07/10/14 Unknown Rx


 


lisinopriL [Zestril TAB] 10 mg PO QDAY #30 tablet 07/10/14 02/03/19 Rx


 


ALBUTEROL Inhaler(NF) [VENTOLIN 2 puff IH QID PRN #1 inha 19 Unknown Rx





Inhaler(NF)]    


 


Benzonatate [Tessalon Perles] 100 mg PO Q8HR #30 capsule 19 Unknown Rx


 


Nicotine [Habitrol] 21 mg TD QDAY #30 patch 19 Unknown Rx


 


Prednisone [predniSONE 10 mg 10 mg PO .TAPER #1 tab.ds.pk 19 Unknown Rx





(6-Day Pack, 21 Tabs)]    


 


Acetaminophen [Non-Aspirin Extra 500 mg PO Q6HR PRN #30 tablet 20 Unknown 

Rx





Strength]    


 


Albuterol Sulfate [Proair 90 mcg IH Q4HR PRN #2 aer.pow.ba 20 Unknown Rx





Respiclick]    


 


DOXYCYCLINE Hyclate [Vibramycin] 100 mg PO Q12HR #10 capsule 20 Unknown Rx


 


Famotidine [Pepcid] 20 mg PO BID #60 tablet 20 Unknown Rx


 


Fluticasone [Flonase] 1 spray NS QDAY #1 bottle 20 Unknown Rx


 


Ipratropium (Nf) [Atrovent] 2 puff IH Q6HR PRN #1 inha 20 Unknown Rx


 


Ipratropium [Atrovent NEB] 0.5 mg IH Q4HR #2 ml 20 Unknown Rx


 


predniSONE [Deltasone] 40 mg PO QDAY #8 tab 20 Unknown Rx











                                    Allergies











Allergy/AdvReac Type Severity Reaction Status Date / Time


 


Iodinated Contrast Media Allergy  Rash Verified 14 21:15














ED Review of Systems


ROS: 


Stated complaint: RESPIRATORY DISTRESS


Other details as noted in HPI





Constitutional: malaise.  denies: fever


Eyes: denies: eye discharge


ENT: denies: epistaxis


Respiratory: cough, shortness of breath, wheezing


Cardiovascular: chest pain.  denies: syncope


Gastrointestinal: as per HPI


Genitourinary: as per HPI


Musculoskeletal: as per HPI


Skin: as per HPI


Neurological: as per HPI


Psychiatric: as per HPI


Hematological/Lymphatic: as per HPI





ED Past Medical Hx





- Past Medical History


Previous Medical History?: Yes


Hx Hypertension: Yes


Hx Congestive Heart Failure: No


Hx Diabetes: No


Hx Asthma: No


Hx COPD: Yes





- Social History


Smoking Status: Former Smoker


Substance Use Type: None





- Medications


Home Medications: 


                                Home Medications











 Medication  Instructions  Recorded  Confirmed  Last Taken  Type


 


Aspirin 325 mg PO QDAY #30 tablet 07/10/14 02/04/19 Unknown Rx


 


Famotidine [Pepcid] 20 mg PO BID #60 tablet 07/10/14 02/04/19 Unknown Rx


 


Pravastatin Sodium [Pravastatin] 10 mg PO QHS #30 tablet 07/10/14 02/04/19 

Unknown Rx


 


amLODIPine 5 mg PO QDAY #30 tablet 07/10/14 02/04/19 Unknown Rx


 


lisinopriL [Zestril TAB] 10 mg PO QDAY #30 tablet 07/10/14 02/04/19 02/03/19 Rx


 


ALBUTEROL Inhaler(NF) [VENTOLIN 2 puff IH QID PRN #1 inha 19  Unknown Rx





Inhaler(NF)]     


 


Benzonatate [Tessalon Perles] 100 mg PO Q8HR #30 capsule 19  Unknown Rx


 


Nicotine [Habitrol] 21 mg TD QDAY #30 patch 19  Unknown Rx


 


Prednisone [predniSONE 10 mg 10 mg PO .TAPER #1 tab.ds.pk 19  Unknown Rx





(6-Day Pack, 21 Tabs)]     


 


Acetaminophen [Non-Aspirin Extra 500 mg PO Q6HR PRN #30 tablet 20  Unknown

 Rx





Strength]     


 


Albuterol Sulfate [Proair 90 mcg IH Q4HR PRN #2 aer.pow.ba 20  Unknown Rx





Respiclick]     


 


DOXYCYCLINE Hyclate [Vibramycin] 100 mg PO Q12HR #10 capsule 20  Unknown 

Rx


 


Famotidine [Pepcid] 20 mg PO BID #60 tablet 20  Unknown Rx


 


Fluticasone [Flonase] 1 spray NS QDAY #1 bottle 20  Unknown Rx


 


Ipratropium (Nf) [Atrovent] 2 puff IH Q6HR PRN #1 inha 20  Unknown Rx


 


Ipratropium [Atrovent NEB] 0.5 mg IH Q4HR #2 ml 20  Unknown Rx


 


predniSONE [Deltasone] 40 mg PO QDAY #8 tab 20  Unknown Rx














ED Physical Exam





- General


Limitations: Physical Limitation


General appearance: alert, anxious, in distress, obese





- Head


Head exam: Present: atraumatic, normocephalic





- Eye


Eye exam: Present: normal appearance, EOMI.  Absent: nystagmus





- ENT


ENT exam: Present: normal exam, normal orophraynx, mucous membranes moist, 

normal external ear exam





- Neck


Neck exam: Present: normal inspection, full ROM.  Absent: tenderness, mening

ismus





- Respiratory


Respiratory exam: Present: respiratory distress, chest wall tenderness, 

decreased breath sounds.  Absent: rales, rhonchi, stridor





- Cardiovascular


Cardiovascular Exam: Present: regular rate, normal rhythm, normal heart sounds. 

 Absent: bradycardia, tachycardia, irregular rhythm, systolic murmur, diastolic 

murmur, rubs, gallop





- GI/Abdominal


GI/Abdominal exam: Present: soft.  Absent: distended, tenderness, guarding, 

rebound, rigid, pulsatile mass





- Rectal


Rectal exam: Present: deferred





- Extremities Exam


Extremities exam: Present: normal inspection, full ROM, other (Moving 4 extr

emities spontaneously.  There is no asymmetry in the bilateral lower 

extremities.  2+ pulses noted in the bilateral upper extremities)





- Back Exam


Back exam: Present: normal inspection.  Absent: tenderness, CVA tenderness (R), 

CVA tenderness (L), paraspinal tenderness, vertebral tenderness





- Neurological Exam


Neurological exam: Present: alert, other (There is no facial droop.  The tongue 

is midline.  Extraocular movements are intact bilaterally.  There is 5 out of 5 

strength in bilateral upper and lower extremities.  Sensation is intact to light

 touch bilateral upper and lower extremities.There is a normal gait.)





- Psychiatric


Psychiatric exam: Present: anxious





- Skin


Skin exam: Present: warm, dry, intact, normal color.  Absent: rash





ED Course


                                   Vital Signs











  20





  15:00 15:04 15:12


 


Temperature   99.2 F


 


Pulse Rate  96 H 93 H


 


Pulse Rate [ 102 H  





Anterior   





Bilateral   





Throughout]   


 


Respiratory  20 20





Rate   


 


Respiratory 21  





Rate [Anterior   





Bilateral   





Throughout]   


 


Blood Pressure  141/81 141/81


 


Blood Pressure   





[Left]   


 


O2 Sat by Pulse  98 98





Oximetry   














  20





  16:23 18:23


 


Temperature  


 


Pulse Rate 84 102 H


 


Pulse Rate [  





Anterior  





Bilateral  





Throughout]  


 


Respiratory  16





Rate  


 


Respiratory  





Rate [Anterior  





Bilateral  





Throughout]  


 


Blood Pressure  


 


Blood Pressure  163/74





[Left]  


 


O2 Sat by Pulse  96





Oximetry  














- Reevaluation(s)


Reevaluation #1: 





20 15:46


Differential diagnosis, including but not limited to: Costochondritis, GERD, 

gastritis, COPD exacerbation, pneumonia





Assessment and plan: 45-year-old gentleman who endorses no acute pulmonary 

embolism or DVT risk factors, presenting with decreased breath sounds, shortness

of breath, chest wall tightness, suspicious for COPD exacerbation and possible 

costochondritis.





Patient at low risk for major adverse cardiac event as per heart score, assuming

negative troponin, which we anticipate.





We will treat his symptoms, start him on BiPAP, obtain screening laboratory 

studies, and reassess.


Reevaluation #2: 





20 17:34


Feeling improved.  Work of breathing improved.  Lung sounds improved.  

Laboratory studies unremarkable.  Feels very anxious.  Repeat troponin, repeat 

EKG ordered.  Versed ordered for anxiety.  Patient to receive trial of 

ambulation on room air with portable pulse ox.  Communicated this order to the 

nurse.


Reevaluation #3: 





20 18:34


Patient feels much improved, although still quite anxious.  He is able to 

ambulate for 6 minutes without significant desaturation.  Work of breathing has 

markedly improved.  We discussed the need for proper hand hygiene, weight loss, 

modification of lifestyle and risk factors.  The patient verbalizes 

understanding.  He endorses that he is reliable to take his medications, and 

follow-up as an outpatient.





ED Medical Decision Making





- Lab Data


Result diagrams: 


                                 20 15:16





                                 20 15:16








                                   Vital Signs











  20





  15:12


 


Temperature 99.2 F


 


Pulse Rate 93 H


 


Respiratory 20





Rate 


 


Blood Pressure 141/81


 


O2 Sat by Pulse 98





Oximetry 











                                   Lab Results











  20 Range/Units





  15:16 


 


WBC  6.3  (4.5-11.0)  K/mm3


 


RBC  5.00  (3.65-5.03)  M/mm3


 


Hgb  15.7 H  (11.8-15.2)  gm/dl


 


Hct  47.1 H  (35.5-45.6)  %


 


MCV  94  (84-94)  fl


 


MCH  31  (28-32)  pg


 


MCHC  33  (32-34)  %


 


RDW  14.8  (13.2-15.2)  %


 


Plt Count  238  (140-440)  K/mm3


 


Lymph % (Auto)  19.3  (13.4-35.0)  %


 


Mono % (Auto)  6.7  (0.0-7.3)  %


 


Eos % (Auto)  1.1  (0.0-4.3)  %


 


Baso % (Auto)  0.9  (0.0-1.8)  %


 


Lymph #  1.2  (1.2-5.4)  K/mm3


 


Mono #  0.4  (0.0-0.8)  K/mm3


 


Eos #  0.1  (0.0-0.4)  K/mm3


 


Baso #  0.1  (0.0-0.1)  K/mm3


 


Seg Neutrophils %  72.0 H  (40.0-70.0)  %


 


Seg Neutrophils #  4.6  (1.8-7.7)  K/mm3














- EKG Data


-: EKG Interpreted by Me


EKG shows normal: sinus rhythm


Rate: normal





- EKG Data





20 15:46


Sinus rhythm, 92 bpm, normal axis,  ms, motion artifact, poor R wave 

progression, the EKG is abnormal, it is not a STEMI, the axis is within normal 

limits.


20 18:17





EKG #2 is unchanged from prior EKG.








- Radiology Data


Radiology results: pending, report reviewed, image reviewed


interpreted by me: 





X-ray of the chest is negative for acute disease





Print Report











Referring Physician: STANLEY NGUYEN 


 


Patient Name: FRANCISCO ARRIAGA 


 


Patient ID: [id number] 


 


YOB: 1975 


 


Sex: Male 


 


Accession: [id number] 


 


Report Date: 2020 


 


Report Status: Finalized 


 


Findings


[facility] [address] [place] [address] 

XRay Report Signed Patient: FRANCISCO ARRIAGA MR#:  [id number] : 1975 

Acct:[id number] Age/Sex: 45 / M ADM Date: 20 Loc: ED Attending Dr: Ld marsh Physician: STANLEY NGUYEN MD Date of Service: 20 Procedure(s): 

XR chest 1V ap Accession Number(s): [id number] cc: STANLEY NGUYEN MD Fluoro Time

In Minutes: CHEST 1 VIEW INDICATION: Dyspnea. COMPARISON: 2019 FINDINGS: 

Support devices: None. Heart: Within normal limits. Pulmonary vasculature: 

Normal. The right pulmonary artery is prominent due to rotation. Lungs/Pleura: 

The lungs are normally expanded and clear. Additional findings: None. 

IMPRESSION: 1. No acute findings. Signer Name: Praveen Hunt MD Signed: 

2020 3:42 PM Workstation Name: [id number] Transcribed By: REF Dictated By: 

PRAVEEN HUNT MD Electronically Authenticated By: PRAVEEN HUNT MD 

Signed Date/Time: 20 154 DD/DT: 20 1540   











Critical Care Time: Yes


Critical care time in (mins) excluding proc time.: 35


Critical care attestation.: 


If time is entered above; I have spent that time in minutes in the direct care 

of this critically ill patient, excluding procedure time.








ED Disposition


Clinical Impression: 


 Morbid obesity with BMI of 40.0-44.9, adult, Chest wall pain





COPD (chronic obstructive pulmonary disease)


Qualifiers:


 COPD type: unspecified COPD Qualified Code(s): J44.9 - Chronic obstructive 

pulmonary disease, unspecified





Disposition:  TO HOME OR SELFCARE


Is pt being admited?: No


Does the pt Need Aspirin: No


Condition: Good


Instructions:  Chest Pain (ED), Chronic Obstructive Pulmonary Disease (ED)


Additional Instructions: 


Take the medications as needed/directed.  Strongly encouraged physical activi

ties as tolerated, appropriate diet, and aggressive weight loss.





Make sure to wash hands with soap and water frequently and often, do not touch 

hands to face, eyes, mouth, and make certain to self quarantine and self isolate

and avoid sick contacts or individuals with coronavirus.





Recommend follow-up with a primary care doctor, pulmonologist, or cardiologist 

within the next 3 to 5 days.  For the patient's convenience, numerous local 

primary care doctors, pulmonologist and cardiologist have been listed for his 

convenience.





Take the prescribed medications as directed and needed, please return to the 

emergency room right away with new, worsened or different symptoms, or symptoms 

not present on the initial emergency room evaluation.








Patient is encouraged to evaluate his own personal sleep device, CPAP, APAP or 

BiPAP, and to determine if there is an available application that he can down

load to his smart phone device so he can track compliance with his nocturnal 

sleep device.








Prescriptions: 


Ipratropium (Nf) [Atrovent] 2 puff IH Q6HR PRN #1 inha


 PRN Reason: Dyspnea


Ipratropium [Atrovent NEB] 0.5 mg IH Q4HR #2 ml


predniSONE [Deltasone] 40 mg PO QDAY #8 tab


Fluticasone [Flonase] 1 spray NS QDAY #1 bottle


Acetaminophen [Non-Aspirin Extra Strength] 500 mg PO Q6HR PRN #30 tablet


 PRN Reason: Pain , Severe (7-10)


Famotidine [Pepcid] 20 mg PO BID #60 tablet


Albuterol Sulfate [Proair Respiclick] 90 mcg IH Q4HR PRN #2 aer.pow.ba


 PRN Reason: Wheezing


DOXYCYCLINE Hyclate [Vibramycin] 100 mg PO Q12HR #10 capsule


Referrals: 


MIKE MCDANIELS MD [Staff Physician] - 3-5 Days


TIMBO CRANE MD [Staff Physician] - 3-5 Days


CLAUDIO MARES MD [Staff Physician] - 3-5 Days


[facility] [Provider Group] - 3-5 Days

## 2021-02-25 ENCOUNTER — HOSPITAL ENCOUNTER (INPATIENT)
Dept: HOSPITAL 5 - ED | Age: 46
LOS: 8 days | Discharge: HOME | DRG: 433 | End: 2021-03-05
Attending: INTERNAL MEDICINE | Admitting: INTERNAL MEDICINE
Payer: OTHER GOVERNMENT

## 2021-02-25 DIAGNOSIS — I25.10: ICD-10-CM

## 2021-02-25 DIAGNOSIS — I11.0: ICD-10-CM

## 2021-02-25 DIAGNOSIS — Z91.041: ICD-10-CM

## 2021-02-25 DIAGNOSIS — K92.1: ICD-10-CM

## 2021-02-25 DIAGNOSIS — I27.20: ICD-10-CM

## 2021-02-25 DIAGNOSIS — E87.8: ICD-10-CM

## 2021-02-25 DIAGNOSIS — J20.9: ICD-10-CM

## 2021-02-25 DIAGNOSIS — E11.9: ICD-10-CM

## 2021-02-25 DIAGNOSIS — E66.01: ICD-10-CM

## 2021-02-25 DIAGNOSIS — F10.10: ICD-10-CM

## 2021-02-25 DIAGNOSIS — K74.60: Primary | ICD-10-CM

## 2021-02-25 DIAGNOSIS — Z87.891: ICD-10-CM

## 2021-02-25 DIAGNOSIS — R18.8: ICD-10-CM

## 2021-02-25 DIAGNOSIS — E87.1: ICD-10-CM

## 2021-02-25 DIAGNOSIS — N39.0: ICD-10-CM

## 2021-02-25 DIAGNOSIS — J44.1: ICD-10-CM

## 2021-02-25 DIAGNOSIS — G47.33: ICD-10-CM

## 2021-02-25 DIAGNOSIS — E88.09: ICD-10-CM

## 2021-02-25 DIAGNOSIS — I50.32: ICD-10-CM

## 2021-02-25 DIAGNOSIS — D50.9: ICD-10-CM

## 2021-02-25 LAB
ALBUMIN SERPL-MCNC: 3.2 G/DL (ref 3.9–5)
ALT SERPL-CCNC: 27 UNITS/L (ref 7–56)
APTT BLD: 33.4 SEC. (ref 24.2–36.6)
BASOPHILS # (AUTO): 0 K/MM3 (ref 0–0.1)
BASOPHILS NFR BLD AUTO: 0.5 % (ref 0–1.8)
BUN SERPL-MCNC: 6 MG/DL (ref 9–20)
BUN/CREAT SERPL: 8 %
CALCIUM SERPL-MCNC: 9.4 MG/DL (ref 8.4–10.2)
EOSINOPHIL # BLD AUTO: 0 K/MM3 (ref 0–0.4)
EOSINOPHIL NFR BLD AUTO: 0.6 % (ref 0–4.3)
HCT VFR BLD CALC: 31.9 % (ref 35.5–45.6)
HEMOLYSIS INDEX: 0
HGB BLD-MCNC: 10.5 GM/DL (ref 11.8–15.2)
INR PPP: 1.27 (ref 0.87–1.13)
LYMPHOCYTES # BLD AUTO: 0.5 K/MM3 (ref 1.2–5.4)
LYMPHOCYTES NFR BLD AUTO: 8.1 % (ref 13.4–35)
MCHC RBC AUTO-ENTMCNC: 33 % (ref 32–34)
MCV RBC AUTO: 103 FL (ref 84–94)
MONOCYTES # (AUTO): 0.9 K/MM3 (ref 0–0.8)
MONOCYTES % (AUTO): 14.1 % (ref 0–7.3)
PLATELET # BLD: 286 K/MM3 (ref 140–440)
RBC # BLD AUTO: 3.09 M/MM3 (ref 3.65–5.03)

## 2021-02-25 PROCEDURE — 49083 ABD PARACENTESIS W/IMAGING: CPT

## 2021-02-25 PROCEDURE — 83550 IRON BINDING TEST: CPT

## 2021-02-25 PROCEDURE — 80053 COMPREHEN METABOLIC PANEL: CPT

## 2021-02-25 PROCEDURE — 82040 ASSAY OF SERUM ALBUMIN: CPT

## 2021-02-25 PROCEDURE — 83605 ASSAY OF LACTIC ACID: CPT

## 2021-02-25 PROCEDURE — 82150 ASSAY OF AMYLASE: CPT

## 2021-02-25 PROCEDURE — 85730 THROMBOPLASTIN TIME PARTIAL: CPT

## 2021-02-25 PROCEDURE — 71045 X-RAY EXAM CHEST 1 VIEW: CPT

## 2021-02-25 PROCEDURE — 82607 VITAMIN B-12: CPT

## 2021-02-25 PROCEDURE — 36415 COLL VENOUS BLD VENIPUNCTURE: CPT

## 2021-02-25 PROCEDURE — 94640 AIRWAY INHALATION TREATMENT: CPT

## 2021-02-25 PROCEDURE — 87086 URINE CULTURE/COLONY COUNT: CPT

## 2021-02-25 PROCEDURE — 85025 COMPLETE CBC W/AUTO DIFF WBC: CPT

## 2021-02-25 PROCEDURE — 85610 PROTHROMBIN TIME: CPT

## 2021-02-25 PROCEDURE — 93306 TTE W/DOPPLER COMPLETE: CPT

## 2021-02-25 PROCEDURE — 36600 WITHDRAWAL OF ARTERIAL BLOOD: CPT

## 2021-02-25 PROCEDURE — 80048 BASIC METABOLIC PNL TOTAL CA: CPT

## 2021-02-25 PROCEDURE — 81001 URINALYSIS AUTO W/SCOPE: CPT

## 2021-02-25 PROCEDURE — 96365 THER/PROPH/DIAG IV INF INIT: CPT

## 2021-02-25 PROCEDURE — 96366 THER/PROPH/DIAG IV INF ADDON: CPT

## 2021-02-25 PROCEDURE — 84484 ASSAY OF TROPONIN QUANT: CPT

## 2021-02-25 PROCEDURE — 96375 TX/PRO/DX INJ NEW DRUG ADDON: CPT

## 2021-02-25 PROCEDURE — 84443 ASSAY THYROID STIM HORMONE: CPT

## 2021-02-25 PROCEDURE — G0378 HOSPITAL OBSERVATION PER HR: HCPCS

## 2021-02-25 PROCEDURE — 84100 ASSAY OF PHOSPHORUS: CPT

## 2021-02-25 PROCEDURE — 83690 ASSAY OF LIPASE: CPT

## 2021-02-25 PROCEDURE — 93005 ELECTROCARDIOGRAM TRACING: CPT

## 2021-02-25 PROCEDURE — 88112 CYTOPATH CELL ENHANCE TECH: CPT

## 2021-02-25 PROCEDURE — 83735 ASSAY OF MAGNESIUM: CPT

## 2021-02-25 PROCEDURE — 83880 ASSAY OF NATRIURETIC PEPTIDE: CPT

## 2021-02-25 PROCEDURE — 82140 ASSAY OF AMMONIA: CPT

## 2021-02-25 PROCEDURE — 82962 GLUCOSE BLOOD TEST: CPT

## 2021-02-25 PROCEDURE — 82747 ASSAY OF FOLIC ACID RBC: CPT

## 2021-02-25 PROCEDURE — 94760 N-INVAS EAR/PLS OXIMETRY 1: CPT

## 2021-02-25 PROCEDURE — 93970 EXTREMITY STUDY: CPT

## 2021-02-25 PROCEDURE — 84145 PROCALCITONIN (PCT): CPT

## 2021-02-25 PROCEDURE — 83036 HEMOGLOBIN GLYCOSYLATED A1C: CPT

## 2021-02-25 PROCEDURE — 89051 BODY FLUID CELL COUNT: CPT

## 2021-02-25 PROCEDURE — 82805 BLOOD GASES W/O2 SATURATION: CPT

## 2021-02-25 PROCEDURE — 74176 CT ABD & PELVIS W/O CONTRAST: CPT

## 2021-02-25 PROCEDURE — 88305 TISSUE EXAM BY PATHOLOGIST: CPT

## 2021-02-25 PROCEDURE — 80076 HEPATIC FUNCTION PANEL: CPT

## 2021-02-25 PROCEDURE — 94660 CPAP INITIATION&MGMT: CPT

## 2021-02-25 PROCEDURE — 84478 ASSAY OF TRIGLYCERIDES: CPT

## 2021-02-25 NOTE — EMERGENCY DEPARTMENT REPORT
HPI





- General


Chief Complaint: Dyspnea/Respdistress


Time Seen by Provider: 02/25/21 16:01





- HPI


HPI: 





This is a 46-year-old  male who presents to the emergency department 

via EMS from home with complaint of swelling and pain to the bilateral legs and 

feet, to the abdomen, along with associated shortness of breath and some 

intermittent chest discomfort.  Overall this has been going on for the past 2 

months but getting progressively worse to the point where it became "too much" 

and the patient came in for evaluation.  He has a past medical history of COPD 

not oxygen dependent, hypertension, obstructive sleep apnea on CPAP, and a 

history of coronary artery disease without coronary stents.  Patient says that 

he usually follows up with the [facility] for primary care and cardiology 

needs.  No recent travel or sick contacts at home.  He has been taking his home 

medications, which includes 40 mg of Lasix per day, compliantly, but has not t

aken any other extremity patient's to treat his symptoms.  He is a former 

smoker.  He denies any fever, nausea or vomiting, constipation or diarrhea.  No 

known exposure to anyone with COVID-19.





ED Past Medical Hx





- Past Medical History


Previous Medical History?: Yes


Hx Hypertension: Yes


Hx Congestive Heart Failure: No


Hx Diabetes: No


Hx Asthma: No


Hx COPD: Yes





- Social History


Smoking Status: Former Smoker


Substance Use Type: Alcohol





- Medications


Home Medications: 


                                Home Medications











 Medication  Instructions  Recorded  Confirmed  Last Taken  Type


 


Aspirin 325 mg PO QDAY #30 tablet 07/10/14 02/04/19 Unknown Rx


 


Famotidine [Pepcid] 20 mg PO BID #60 tablet 07/10/14 02/04/19 Unknown Rx


 


Pravastatin Sodium [Pravastatin] 10 mg PO QHS #30 tablet 07/10/14 02/04/19 

Unknown Rx


 


amLODIPine 5 mg PO QDAY #30 tablet 07/10/14 02/04/19 Unknown Rx


 


lisinopriL [Zestril TAB] 10 mg PO QDAY #30 tablet 07/10/14 02/04/19 02/03/19 Rx


 


ALBUTEROL Inhaler(NF) [VENTOLIN 2 puff IH QID PRN #1 inha 02/06/19  Unknown Rx





Inhaler(NF)]     


 


Benzonatate [Tessalon Perles] 100 mg PO Q8HR #30 capsule 02/06/19  Unknown Rx


 


Nicotine [Habitrol] 21 mg TD QDAY #30 patch 02/06/19  Unknown Rx


 


Prednisone [predniSONE 10 mg 10 mg PO .TAPER #1 tab.ds.pk 02/06/19  Unknown Rx





(6-Day Pack, 21 Tabs)]     


 


Acetaminophen [Non-Aspirin Extra 500 mg PO Q6HR PRN #30 tablet 04/08/20  Unknown

 Rx





Strength]     


 


Albuterol Sulfate [Proair 90 mcg IH Q4HR PRN #2 aer.pow.ba 04/08/20  Unknown Rx





Respiclick]     


 


DOXYCYCLINE Hyclate [Vibramycin] 100 mg PO Q12HR #10 capsule 04/08/20  Unknown 

Rx


 


Famotidine [Pepcid] 20 mg PO BID #60 tablet 04/08/20  Unknown Rx


 


Fluticasone [Flonase] 1 spray NS QDAY #1 bottle 04/08/20  Unknown Rx


 


Ipratropium (Nf) [Atrovent] 2 puff IH Q6HR PRN #1 inha 04/08/20  Unknown Rx


 


Ipratropium [Atrovent NEB] 0.5 mg IH Q4HR #2 ml 04/08/20  Unknown Rx


 


predniSONE [Deltasone] 40 mg PO QDAY #8 tab 04/08/20  Unknown Rx














ED Review of Systems


ROS: 


Stated complaint: LEG PAIN


Other details as noted in HPI





Comment: All other systems reviewed and negative


Constitutional: denies: chills, fever


Eyes: denies: eye pain, vision change


ENT: denies: ear pain, throat pain


Respiratory: shortness of breath, SOB with exertion


Cardiovascular: chest pain, edema


Gastrointestinal: abdominal pain.  denies: vomiting


Genitourinary: denies: dysuria, discharge


Musculoskeletal: joint swelling, arthralgia, myalgia


Skin: denies: rash, lesions


Neurological: denies: numbness, paresthesias





Physical Exam





- Physical Exam


Vital Signs: 


                                   Vital Signs











  02/25/21





  15:03


 


Temperature 98.1 F


 


Pulse Rate 113 H


 


Respiratory 20





Rate 


 


Blood Pressure 141/60


 


O2 Sat by Pulse 98





Oximetry 











Physical Exam: 





GENERAL: The patient is ill-appearing.


HENT: Normocephalic.  Atraumatic.    Patient has moist mucous membranes.


EYES: Extraocular motions are intact.  Pupils equal reactive to light 

bilaterally.


NECK: Supple. Trachea is midline.


CHEST/LUNGS: Clear to auscultation.  There is tachypnea with some conversational

 dyspnea.  


HEART/CARDIOVASCULAR: Regular.  There is mild tachycardia.  There is no murmur.


ABDOMEN: Abdomen is soft, nontender.  Patient has normal bowel sounds.  Very 

morbidly obese habitus.


SKIN: Skin is warm and dry.  There is moderate pitting edema to the bilateral 

lower extremities.  There is moderate nonpitting swelling to the lower half of 

the abdomen.


NEURO: The patient is awake, alert, and oriented.  The patient is cooperative.  

The patient has no focal neurologic deficits.  Normal speech.


MUSCULOSKELETAL: Tenderness to palpation to the bilateral lower extremities.





ED Course


                                   Vital Signs











  02/25/21





  15:03


 


Temperature 98.1 F


 


Pulse Rate 113 H


 


Respiratory 20





Rate 


 


Blood Pressure 141/60


 


O2 Sat by Pulse 98





Oximetry 














ED Medical Decision Making





- Lab Data


Result diagrams: 


                                 02/25/21 15:22 02/25/21 15:26





                                   Lab Results











  02/25/21 02/25/21 02/25/21 Range/Units





  15:22 15:22 15:26 


 


WBC  6.7    (4.5-11.0)  K/mm3


 


RBC  3.09 L    (3.65-5.03)  M/mm3


 


Hgb  10.5 L    (11.8-15.2)  gm/dl


 


Hct  31.9 L    (35.5-45.6)  %


 


MCV  103 H    (84-94)  fl


 


MCH  34 H    (28-32)  pg


 


MCHC  33    (32-34)  %


 


RDW  15.0    (13.2-15.2)  %


 


Plt Count  286    (140-440)  K/mm3


 


Lymph % (Auto)  8.1 L    (13.4-35.0)  %


 


Mono % (Auto)  14.1 H    (0.0-7.3)  %


 


Eos % (Auto)  0.6    (0.0-4.3)  %


 


Baso % (Auto)  0.5    (0.0-1.8)  %


 


Lymph # (Auto)  0.5 L    (1.2-5.4)  K/mm3


 


Mono # (Auto)  0.9 H    (0.0-0.8)  K/mm3


 


Eos # (Auto)  0.0    (0.0-0.4)  K/mm3


 


Baso # (Auto)  0.0    (0.0-0.1)  K/mm3


 


Seg Neutrophils %  76.7 H    (40.0-70.0)  %


 


Seg Neutrophils #  5.1    (1.8-7.7)  K/mm3


 


PT    15.9 H  (12.2-14.9)  Sec.


 


INR    1.27 H  (0.87-1.13)  


 


APTT    33.4  (24.2-36.6)  Sec.


 


Sodium     (137-145)  mmol/L


 


Potassium     (3.6-5.0)  mmol/L


 


Chloride     ()  mmol/L


 


Carbon Dioxide     (22-30)  mmol/L


 


Anion Gap     mmol/L


 


BUN     (9-20)  mg/dL


 


Creatinine     (0.8-1.3)  mg/dL


 


Estimated GFR     ml/min


 


BUN/Creatinine Ratio     %


 


Glucose     ()  mg/dL


 


Calcium     (8.4-10.2)  mg/dL


 


Total Bilirubin     (0.1-1.2)  mg/dL


 


AST     (5-40)  units/L


 


ALT     (7-56)  units/L


 


Alkaline Phosphatase     ()  units/L


 


Troponin T     (0.00-0.029)  ng/mL


 


NT-Pro-B Natriuret Pep   137.0   (0-450)  pg/mL


 


Total Protein     (6.3-8.2)  g/dL


 


Albumin     (3.9-5)  g/dL


 


Albumin/Globulin Ratio     %


 


TSH     (0.270-4.200)  mlU/mL














  02/25/21 02/25/21 02/25/21 Range/Units





  15:26 16:29 16:55 


 


WBC     (4.5-11.0)  K/mm3


 


RBC     (3.65-5.03)  M/mm3


 


Hgb     (11.8-15.2)  gm/dl


 


Hct     (35.5-45.6)  %


 


MCV     (84-94)  fl


 


MCH     (28-32)  pg


 


MCHC     (32-34)  %


 


RDW     (13.2-15.2)  %


 


Plt Count     (140-440)  K/mm3


 


Lymph % (Auto)     (13.4-35.0)  %


 


Mono % (Auto)     (0.0-7.3)  %


 


Eos % (Auto)     (0.0-4.3)  %


 


Baso % (Auto)     (0.0-1.8)  %


 


Lymph # (Auto)     (1.2-5.4)  K/mm3


 


Mono # (Auto)     (0.0-0.8)  K/mm3


 


Eos # (Auto)     (0.0-0.4)  K/mm3


 


Baso # (Auto)     (0.0-0.1)  K/mm3


 


Seg Neutrophils %     (40.0-70.0)  %


 


Seg Neutrophils #     (1.8-7.7)  K/mm3


 


PT     (12.2-14.9)  Sec.


 


INR     (0.87-1.13)  


 


APTT     (24.2-36.6)  Sec.


 


Sodium  130 L    (137-145)  mmol/L


 


Potassium  5.0    (3.6-5.0)  mmol/L


 


Chloride  88.8 L    ()  mmol/L


 


Carbon Dioxide  24    (22-30)  mmol/L


 


Anion Gap  22    mmol/L


 


BUN  6 L    (9-20)  mg/dL


 


Creatinine  0.8    (0.8-1.3)  mg/dL


 


Estimated GFR  > 60    ml/min


 


BUN/Creatinine Ratio  8    %


 


Glucose  334 H    ()  mg/dL


 


Calcium  9.4    (8.4-10.2)  mg/dL


 


Total Bilirubin  1.00    (0.1-1.2)  mg/dL


 


AST  50 H    (5-40)  units/L


 


ALT  27    (7-56)  units/L


 


Alkaline Phosphatase  214 H    ()  units/L


 


Troponin T   < 0.010   (0.00-0.029)  ng/mL


 


NT-Pro-B Natriuret Pep     (0-450)  pg/mL


 


Total Protein  6.3    (6.3-8.2)  g/dL


 


Albumin  3.2 L    (3.9-5)  g/dL


 


Albumin/Globulin Ratio  1.0    %


 


TSH    2.590  (0.270-4.200)  mlU/mL














- EKG Data


-: EKG Interpreted by Me


EKG shows normal: sinus rhythm, axis, intervals, QRS complexes, ST-T waves


Rate: tachycardia (110 bpm)





- EKG Data


When compared to previous EKG there are: no significant change


Interpretation: unchanged when compared t (4/8/20)





- Radiology Data


Radiology results: report reviewed, image reviewed


interpreted by me: 





Chest x-ray does not show any acute process.  There are no pleural effusions, 

obvious pneumonia and there is no pneumothorax. No significant cardiomegaly.





DUPLEX DOPPLER LOWER EXTREMITY VEINS, BILATERAL  INDICATION / CLINICAL 

INFORMATION: B/L leg swelling and pain.  TECHNIQUE: Duplex doppler imaging was 

performed through the veins of both lower extremities using venous compression 

and other maneuvers.  COMPARISON: None available.  FINDINGS: RIGHT COMMON 

FEMORAL VEIN: Negative. RIGHT FEMORAL VEIN: Negative. RIGHT POPLITEAL VEIN: 

Negative. RIGHT CALF VEINS: Negative.  LEFT COMMON FEMORAL VEIN: Negative. LEFT 

FEMORAL VEIN: Negative. LEFT POPLITEAL VEIN: Negative. LEFT CALF VEINS: 

Negative.  ADDITIONAL FINDINGS: None.  IMPRESSION: 1. No sonographic evidence 

for DVT in either lower extremity.





- Medical Decision Making





This patient presents to the emergency department with a complaint of swelling 

to the bilateral lower extremities and abdomen, as well as pain in both of these

 areas.  The patient also complains of some shortness of breath.  The patient is

 extremely swollen to the legs and abdomen to the point where he is having dif

ficulty with some of his ADLs.  Chest x-ray did not show any pleural effusions, 

pneumonia, pneumothorax, or any other acute process.  The patient's labs shows 

hyperglycemia without signs of diabetic ketoacidosis.  He has hypoalbuminemia.  

No renal insufficiency.  There is a slight elevation in his AST level, and the 

transaminases are in a pattern consistent with chronic alcohol consumption.  The

 patient also has a slightly elevated INR level despite not being on 

anticoagulation.  He had bilateral lower extremity venous Doppler ultrasounds 

that were negative for any DVT.





Patient was given extra Lasix to help with diuresis.  He was given some IV 

insulin and IV analgesia.





The patient will be admitted to the hospital for further evaluation and 

treatment was accepted for admission by the hospitalist, Dr. Severino.


Critical Care Time: No


Critical care attestation.: 


If time is entered above; I have spent that time in minutes in the direct care 

of this critically ill patient, excluding procedure time.








ED Disposition


Clinical Impression: 


 Anasarca, Hyponatremia, Hypoalbuminemia, Morbid obesity with BMI of 40.0-44.9, 

adult





Dyspnea


Qualifiers:


 Dyspnea type: shortness of breath Qualified Code(s): R06.02 - Shortness of 

breath; R06.00 - Dyspnea, unspecified; R06.01 - Orthopnea





Disposition: DC-09 OP ADMIT IP TO THIS HOSP


Is pt being admited?: Yes


Condition: Fair


Time of Disposition: 17:50

## 2021-02-25 NOTE — VASCULAR LAB REPORT
DUPLEX DOPPLER LOWER EXTREMITY VEINS, BILATERAL



INDICATION / CLINICAL INFORMATION:

B/L leg swelling and pain.



TECHNIQUE:

Duplex doppler imaging was performed through the veins of both lower extremities using venous devin
karina and other maneuvers.



COMPARISON: 

None available.



FINDINGS:

RIGHT COMMON FEMORAL VEIN: Negative.

RIGHT FEMORAL VEIN: Negative.

RIGHT POPLITEAL VEIN: Negative.

RIGHT CALF VEINS: Negative.



LEFT COMMON FEMORAL VEIN: Negative.

LEFT FEMORAL VEIN: Negative.

LEFT POPLITEAL VEIN: Negative.

LEFT CALF VEINS: Negative.



ADDITIONAL FINDINGS: None.



IMPRESSION:

1. No sonographic evidence for DVT in either lower extremity.



Signer Name: Quinn Gama MD 

Signed: 2/25/2021 5:46 PM

Workstation Name: [id number]

## 2021-02-25 NOTE — HISTORY AND PHYSICAL REPORT
History of Present Illness


Date of examination: 02/25/21


Date of admission: 


02/25/21 17:51





Chief complaint: 


Bilateral pedal edema and abdominal distention





History of present illness: 





This is a 46-year-old  male who presents to the emergency department 

via EMS from home with complaint of swelling and pain to the bilateral legs and 

feet, to the abdomen, along with associated shortness of breath and some 

intermittent chest discomfort.  Overall this has been going on for the past 2 

months but getting progressively worse to the point where it became "too much" 

and the patient came in for evaluation.  He has a past medical history of COPD 

not oxygen dependent, hypertension, obstructive sleep apnea on CPAP, and a 

history of coronary artery disease without coronary stents.  Patient says that 

he usually follows up with the [facility] for primary care and cardiology 

needs.  No recent travel or sick contacts at home.  He has been taking his home 

medications, which includes 40 mg of Lasix per day, compliantly, but has not 

taken any other extremity patient's to treat his symptoms.  He is a former 

smoker.  He denies any fever, nausea or vomiting, constipation or diarrhea.  No 

known exposure to anyone with COVID-19.











- Past Medical History


Previous Medical History?: Yes


Hx Hypertension: Yes


Hx COPD: Yes


Pulmonary hypertension





- Social History


Smoking Status: Former Smoker


Substance Use Type: Alcohol











Review of Systems


ROS: 


Stated complaint: LEG PAIN


Other details as noted in HPI





Comment: All other systems reviewed and negative


Constitutional: denies: chills, fever


Eyes: denies: eye pain, vision change


ENT: denies: ear pain, throat pain


Respiratory: shortness of breath, SOB with exertion


Cardiovascular: chest pain, edema


Gastrointestinal: abdominal pain.  denies: vomiting


Genitourinary: denies: dysuria, discharge


Musculoskeletal: joint swelling, arthralgia, myalgia


Skin: denies: rash, lesions


Neurological: denies: numbness, paresthesias








Medications and Allergies


                                    Allergies











Allergy/AdvReac Type Severity Reaction Status Date / Time


 


Iodinated Contrast Media Allergy  Rash Verified 07/08/14 21:15











                                Home Medications











 Medication  Instructions  Recorded  Confirmed  Last Taken  Type


 


Aspirin 325 mg PO QDAY #30 tablet 07/10/14 02/04/19 Unknown Rx


 


Famotidine [Pepcid] 20 mg PO BID #60 tablet 07/10/14 02/04/19 Unknown Rx


 


Pravastatin Sodium [Pravastatin] 10 mg PO QHS #30 tablet 07/10/14 02/04/19 

Unknown Rx


 


amLODIPine 5 mg PO QDAY #30 tablet 07/10/14 02/04/19 Unknown Rx


 


lisinopriL [Zestril TAB] 10 mg PO QDAY #30 tablet 07/10/14 02/04/19 02/03/19 Rx


 


ALBUTEROL Inhaler(NF) [VENTOLIN 2 puff IH QID PRN #1 inha 02/06/19  Unknown Rx





Inhaler(NF)]     


 


Benzonatate [Tessalon Perles] 100 mg PO Q8HR #30 capsule 02/06/19  Unknown Rx


 


Nicotine [Habitrol] 21 mg TD QDAY #30 patch 02/06/19  Unknown Rx


 


Prednisone [predniSONE 10 mg 10 mg PO .TAPER #1 tab.ds.pk 02/06/19  Unknown Rx





(6-Day Pack, 21 Tabs)]     


 


Acetaminophen [Non-Aspirin Extra 500 mg PO Q6HR PRN #30 tablet 04/08/20  Unknown

 Rx





Strength]     


 


Albuterol Sulfate [Proair 90 mcg IH Q4HR PRN #2 aer.pow.ba 04/08/20  Unknown Rx





Respiclick]     


 


DOXYCYCLINE Hyclate [Vibramycin] 100 mg PO Q12HR #10 capsule 04/08/20  Unknown 

Rx


 


Famotidine [Pepcid] 20 mg PO BID #60 tablet 04/08/20  Unknown Rx


 


Fluticasone [Flonase] 1 spray NS QDAY #1 bottle 04/08/20  Unknown Rx


 


Ipratropium (Nf) [Atrovent] 2 puff IH Q6HR PRN #1 inha 04/08/20  Unknown Rx


 


Ipratropium [Atrovent NEB] 0.5 mg IH Q4HR #2 ml 04/08/20  Unknown Rx


 


predniSONE [Deltasone] 40 mg PO QDAY #8 tab 04/08/20  Unknown Rx














Exam





- Physical Exam


Narrative exam: 





Morbidly obese male





- Constitutional


Vitals: 


                                        











Temp Pulse Resp BP Pulse Ox


 


 98.1 F   113 H  20   141/60   98 


 


 02/25/21 15:03  02/25/21 15:03  02/25/21 15:03  02/25/21 15:03  02/25/21 15:03











General appearance: Present: no acute distress, severe distress, well-nourished





- EENT


Eyes: Present: PERRL


ENT: hearing intact, clear oral mucosa





- Neck


Neck: Present: supple, normal ROM





- Respiratory


Respiratory effort: normal


Respiratory: bilateral: CTA, rhonchi, wheezing





- Cardiovascular


Heart rate: 78


Rhythm: regular


Heart Sounds: Present: S1 & S2.  Absent: rub, click





- Extremities


Extremities: pulses symmetrical, No edema


Extremity abnormal: edema (4+ pedal edema), other (4+ pedal edema)


Peripheral Pulses: within normal limits





- Abdominal


General gastrointestinal: Present: soft, non-tender, non-distended, normal bowel

 sounds


Male genitourinary: Present: normal





- Integumentary


Integumentary: Present: clear, warm, dry





- Musculoskeletal


Musculoskeletal: gait normal, strength equal bilaterally





- Psychiatric


Psychiatric: appropriate mood/affect, intact judgment & insight





- Neurologic


Neurologic: CNII-XII intact, moves all extremities





HEART Score





- HEART Score


History: Moderately suspicious


Age: 45-65


Risk factors: 1-2 risk factors


Troponin: 


                                        











Troponin T  < 0.010 ng/mL (0.00-0.029)   02/25/21  16:29    











Troponin: < normal limit





- Critical Actions


Critical Actions: 0-3 pts:0.9-1.7%risk of adverse cardiac event.Candidate for 

discharge





Results





- Labs


CBC & Chem 7: 


                                 02/26/21 04:14





                                 02/26/21 04:14


Labs: 


                             Laboratory Last Values











WBC  6.7 K/mm3 (4.5-11.0)   02/25/21  15:22    


 


RBC  3.09 M/mm3 (3.65-5.03)  L  02/25/21  15:22    


 


Hgb  10.5 gm/dl (11.8-15.2)  L  02/25/21  15:22    


 


Hct  31.9 % (35.5-45.6)  L  02/25/21  15:22    


 


MCV  103 fl (84-94)  H  02/25/21  15:22    


 


MCH  34 pg (28-32)  H  02/25/21  15:22    


 


MCHC  33 % (32-34)   02/25/21  15:22    


 


RDW  15.0 % (13.2-15.2)   02/25/21  15:22    


 


Plt Count  286 K/mm3 (140-440)   02/25/21  15:22    


 


Lymph % (Auto)  8.1 % (13.4-35.0)  L  02/25/21  15:22    


 


Mono % (Auto)  14.1 % (0.0-7.3)  H  02/25/21  15:22    


 


Eos % (Auto)  0.6 % (0.0-4.3)   02/25/21  15:22    


 


Baso % (Auto)  0.5 % (0.0-1.8)   02/25/21  15:22    


 


Lymph # (Auto)  0.5 K/mm3 (1.2-5.4)  L  02/25/21  15:22    


 


Mono # (Auto)  0.9 K/mm3 (0.0-0.8)  H  02/25/21  15:22    


 


Eos # (Auto)  0.0 K/mm3 (0.0-0.4)   02/25/21  15:22    


 


Baso # (Auto)  0.0 K/mm3 (0.0-0.1)   02/25/21  15:22    


 


Seg Neutrophils %  76.7 % (40.0-70.0)  H  02/25/21  15:22    


 


Seg Neutrophils #  5.1 K/mm3 (1.8-7.7)   02/25/21  15:22    


 


PT  15.9 Sec. (12.2-14.9)  H  02/25/21  15:26    


 


INR  1.27  (0.87-1.13)  H  02/25/21  15:26    


 


APTT  33.4 Sec. (24.2-36.6)   02/25/21  15:26    


 


Sodium  130 mmol/L (137-145)  L  02/25/21  15:26    


 


Potassium  5.0 mmol/L (3.6-5.0)   02/25/21  15:26    


 


Chloride  88.8 mmol/L ()  L  02/25/21  15:26    


 


Carbon Dioxide  24 mmol/L (22-30)   02/25/21  15:26    


 


Anion Gap  22 mmol/L  02/25/21  15:26    


 


BUN  6 mg/dL (9-20)  L  02/25/21  15:26    


 


Creatinine  0.8 mg/dL (0.8-1.3)   02/25/21  15:26    


 


Estimated GFR  > 60 ml/min  02/25/21  15:26    


 


BUN/Creatinine Ratio  8 %  02/25/21  15:26    


 


Glucose  334 mg/dL ()  H  02/25/21  15:26    


 


Calcium  9.4 mg/dL (8.4-10.2)   02/25/21  15:26    


 


Total Bilirubin  1.00 mg/dL (0.1-1.2)   02/25/21  15:26    


 


AST  50 units/L (5-40)  H  02/25/21  15:26    


 


ALT  27 units/L (7-56)   02/25/21  15:26    


 


Alkaline Phosphatase  214 units/L ()  H  02/25/21  15:26    


 


Troponin T  < 0.010 ng/mL (0.00-0.029)   02/25/21  16:29    


 


NT-Pro-B Natriuret Pep  137.0 pg/mL (0-450)   02/25/21  15:22    


 


Total Protein  6.3 g/dL (6.3-8.2)   02/25/21  15:26    


 


Albumin  3.2 g/dL (3.9-5)  L  02/25/21  15:26    


 


Albumin/Globulin Ratio  1.0 %  02/25/21  15:26    


 


TSH  2.590 mlU/mL (0.270-4.200)   02/25/21  16:55    








                                    Short CBC











  02/25/21 02/26/21 Range/Units





  15:22 04:14 


 


WBC  6.7  5.9  (4.5-11.0)  K/mm3


 


Hgb  10.5 L  10.6 L  (11.8-15.2)  gm/dl


 


Hct  31.9 L  32.5 L  (35.5-45.6)  %


 


Plt Count  286  244  (140-440)  K/mm3








                                       BMP











  02/25/21 02/26/21





  15:26 04:14


 


Sodium  130 L  132 L


 


Potassium  5.0  4.3


 


Chloride  88.8 L  91.0 L


 


Carbon Dioxide  24  24


 


BUN  6 L  5 L


 


Creatinine  0.8  0.6 L


 


Glucose  334 H  236 H


 


Calcium  9.4  8.8








                                 Cardiac Enzymes











  02/25/21 Range/Units





  16:29 


 


Troponin T  < 0.010  (0.00-0.029)  ng/mL








                                 Liver Function











  02/25/21 02/26/21 Range/Units





  15:26 04:14 


 


Total Bilirubin  1.00  0.90  (0.1-1.2)  mg/dL


 


AST  50 H  45 H  (5-40)  units/L


 


ALT  27  23  (7-56)  units/L


 


Alkaline Phosphatase  214 H  203 H  ()  units/L


 


Albumin  3.2 L  3.3 L  (3.9-5)  g/dL








                                      Urine











  02/26/21 Range/Units





  02:22 


 


Urine Color  Heather  (Yellow)  


 


Urine pH  6.0  (5.0-7.0)  


 


Ur Specific Gravity  1.018  (1.003-1.030)  


 


Urine Protein  30 mg/dl  (Negative)  mg/dL


 


Urine Glucose (UA)  >=500  (Negative)  mg/dL














- Imaging and Cardiology


EKG: report reviewed


Chest x-ray: report reviewed





Assessment and Plan


Advance Directives: Yes (Full code)


VTE prophylaxis?: Chemical


Plan of care discussed with patient/family: Yes





- Patient Problems


(1) Pulmonary hypertension


Current Visit: Yes   Status: Acute   


Plan to address problem: 


Secondary to COPD resulting in bilateral pedal edema and abdominal distention


Echocardiogram for pulmonary hypertension


IV Lasix in the meantime


Cardiology consult








(2) COPD with exacerbation


Current Visit: Yes   Status: Acute   


Plan to address problem: 


Patient initiated on duo nebs IV Levaquin and IV Solu-Medrol








(3) Hyponatremia


Current Visit: Yes   Status: Acute   


Plan to address problem: 


Mild


Patient on Lasix which may exacerbate the low sodium levels








(4) Morbid obesity with BMI of 40.0-44.9, adult


Current Visit: Yes   Status: Chronic   


Plan to address problem: 


Needs bariatric surgery as outpatient


Follow-up with Dr. Whitlock








(5) Pedal edema


Current Visit: Yes   Status: Acute   


Plan to address problem: 


IV Lasix for now


No DVTs


Secondary to pulmonary hypertension








(6) Hypoalbuminemia


Current Visit: Yes   Status: Acute   


Plan to address problem: 


Malnutrition


Dietitian consult








(7) Anemia


Current Visit: Yes   Status: Chronic   


Qualifiers: 


   Anemia type: unspecified type   Qualified Code(s): D64.9 - Anemia, 

unspecified   


Plan to address problem: 


Anemia work-up








(8) DVT prophylaxis


Current Visit: Yes   Status: Acute   


Plan to address problem: 


On heparin and GI prophylaxis

## 2021-02-25 NOTE — XRAY REPORT
CHEST 1 VIEW 



INDICATION:  DYSPNEA.



COMPARISON:  4/8/2020



FINDINGS:

Support devices: None. 



Heart: Within normal limits. 

Lungs/Pleura: No acute air space or interstitial disease. 



Additional findings: None.



IMPRESSION:

 No acute findings or change since 4/8/2020.



Signer Name: Miguel Schwarz Jr, MD 

Signed: 2/25/2021 3:40 PM

Workstation Name: [id number]

## 2021-02-26 LAB
ALBUMIN SERPL-MCNC: 3.3 G/DL (ref 3.9–5)
ALBUMIN SERPL-MCNC: 3.6 G/DL (ref 3.9–5)
ALT SERPL-CCNC: 23 UNITS/L (ref 7–56)
ALT SERPL-CCNC: 27 UNITS/L (ref 7–56)
BACTERIA #/AREA URNS HPF: (no result) /HPF
BASOPHILS # (AUTO): 0 K/MM3 (ref 0–0.1)
BASOPHILS NFR BLD AUTO: 0.3 % (ref 0–1.8)
BILIRUB DIRECT SERPL-MCNC: 0.5 MG/DL (ref 0–0.2)
BILIRUB UR QL STRIP: (no result)
BLOOD UR QL VISUAL: (no result)
BUN SERPL-MCNC: 5 MG/DL (ref 9–20)
BUN/CREAT SERPL: 8 %
CALCIUM SERPL-MCNC: 8.8 MG/DL (ref 8.4–10.2)
EOSINOPHIL # BLD AUTO: 0 K/MM3 (ref 0–0.4)
EOSINOPHIL NFR BLD AUTO: 0.2 % (ref 0–4.3)
HCT VFR BLD CALC: 32.5 % (ref 35.5–45.6)
HEMOLYSIS INDEX: 0
HGB BLD-MCNC: 10.6 GM/DL (ref 11.8–15.2)
HYALINE CASTS #/AREA URNS LPF: 2 /LPF
IRON SERPL-MCNC: 36 UG/DL (ref 49–181)
LYMPHOCYTES # BLD AUTO: 0.3 K/MM3 (ref 1.2–5.4)
LYMPHOCYTES NFR BLD AUTO: 4.9 % (ref 13.4–35)
MCHC RBC AUTO-ENTMCNC: 33 % (ref 32–34)
MCV RBC AUTO: 105 FL (ref 84–94)
MONOCYTES # (AUTO): 0.4 K/MM3 (ref 0–0.8)
MONOCYTES % (AUTO): 6.6 % (ref 0–7.3)
PH UR STRIP: 6 [PH] (ref 5–7)
PLATELET # BLD: 244 K/MM3 (ref 140–440)
RBC # BLD AUTO: 3.1 M/MM3 (ref 3.65–5.03)
RBC #/AREA URNS HPF: > 182 /HPF (ref 0–6)
TIBC SERPL-MCNC: 191 MCG/DL (ref 250–450)
UROBILINOGEN UR-MCNC: 2 MG/DL (ref ?–2)
WBC #/AREA URNS HPF: 70 /HPF (ref 0–6)

## 2021-02-26 PROCEDURE — 5A09357 ASSISTANCE WITH RESPIRATORY VENTILATION, LESS THAN 24 CONSECUTIVE HOURS, CONTINUOUS POSITIVE AIRWAY PRESSURE: ICD-10-PCS | Performed by: INTERNAL MEDICINE

## 2021-02-26 RX ADMIN — Medication PRN ML: at 00:55

## 2021-02-26 RX ADMIN — BENZONATATE SCH MG: 100 CAPSULE ORAL at 05:39

## 2021-02-26 RX ADMIN — Medication PRN ML: at 05:40

## 2021-02-26 RX ADMIN — FUROSEMIDE SCH MG: 10 INJECTION, SOLUTION INTRAVENOUS at 05:39

## 2021-02-26 RX ADMIN — METHYLPREDNISOLONE SODIUM SUCCINATE SCH MG: 40 INJECTION, POWDER, FOR SOLUTION INTRAMUSCULAR; INTRAVENOUS at 05:39

## 2021-02-26 RX ADMIN — FLUTICASONE PROPIONATE SCH MCG: 50 SPRAY, METERED NASAL at 11:24

## 2021-02-26 RX ADMIN — INSULIN LISPRO SCH UNIT: 100 INJECTION, SOLUTION INTRAVENOUS; SUBCUTANEOUS at 13:49

## 2021-02-26 RX ADMIN — POTASSIUM CHLORIDE SCH MEQ: 1500 TABLET, EXTENDED RELEASE ORAL at 22:59

## 2021-02-26 RX ADMIN — METHYLPREDNISOLONE SODIUM SUCCINATE SCH MG: 40 INJECTION, POWDER, FOR SOLUTION INTRAMUSCULAR; INTRAVENOUS at 13:16

## 2021-02-26 RX ADMIN — METHYLPREDNISOLONE SODIUM SUCCINATE SCH MG: 40 INJECTION, POWDER, FOR SOLUTION INTRAMUSCULAR; INTRAVENOUS at 22:59

## 2021-02-26 RX ADMIN — ARFORMOTEROL TARTRATE SCH: 15 SOLUTION RESPIRATORY (INHALATION) at 20:31

## 2021-02-26 RX ADMIN — HEPARIN SODIUM SCH UNIT: 5000 INJECTION, SOLUTION INTRAVENOUS; SUBCUTANEOUS at 00:52

## 2021-02-26 RX ADMIN — Medication SCH ML: at 22:58

## 2021-02-26 RX ADMIN — IPRATROPIUM BROMIDE AND ALBUTEROL SULFATE SCH AMPUL: .5; 3 SOLUTION RESPIRATORY (INHALATION) at 20:31

## 2021-02-26 RX ADMIN — INSULIN LISPRO SCH UNIT: 100 INJECTION, SOLUTION INTRAVENOUS; SUBCUTANEOUS at 23:08

## 2021-02-26 RX ADMIN — FAMOTIDINE SCH MG: 20 TABLET ORAL at 22:59

## 2021-02-26 RX ADMIN — Medication SCH ML: at 11:31

## 2021-02-26 RX ADMIN — PRAVASTATIN SODIUM SCH MG: 20 TABLET ORAL at 23:03

## 2021-02-26 RX ADMIN — INSULIN LISPRO SCH UNIT: 100 INJECTION, SOLUTION INTRAVENOUS; SUBCUTANEOUS at 16:56

## 2021-02-26 RX ADMIN — IPRATROPIUM BROMIDE AND ALBUTEROL SULFATE SCH: .5; 3 SOLUTION RESPIRATORY (INHALATION) at 22:44

## 2021-02-26 RX ADMIN — BENZONATATE SCH MG: 100 CAPSULE ORAL at 13:15

## 2021-02-26 RX ADMIN — HEPARIN SODIUM SCH UNIT: 5000 INJECTION, SOLUTION INTRAVENOUS; SUBCUTANEOUS at 11:26

## 2021-02-26 RX ADMIN — BUDESONIDE SCH MG: 0.5 INHALANT RESPIRATORY (INHALATION) at 20:31

## 2021-02-26 RX ADMIN — POTASSIUM CHLORIDE SCH MEQ: 1500 TABLET, EXTENDED RELEASE ORAL at 11:25

## 2021-02-26 RX ADMIN — BENZONATATE SCH MG: 100 CAPSULE ORAL at 22:59

## 2021-02-26 RX ADMIN — METHYLPREDNISOLONE SODIUM SUCCINATE SCH MG: 40 INJECTION, POWDER, FOR SOLUTION INTRAMUSCULAR; INTRAVENOUS at 00:52

## 2021-02-26 RX ADMIN — LISINOPRIL SCH MG: 10 TABLET ORAL at 11:25

## 2021-02-26 RX ADMIN — IPRATROPIUM BROMIDE AND ALBUTEROL SULFATE SCH AMPUL: .5; 3 SOLUTION RESPIRATORY (INHALATION) at 13:27

## 2021-02-26 RX ADMIN — ASPIRIN SCH MG: 325 TABLET ORAL at 11:25

## 2021-02-26 RX ADMIN — NICOTINE SCH MG: 21 PATCH TRANSDERMAL at 11:24

## 2021-02-26 RX ADMIN — IPRATROPIUM BROMIDE AND ALBUTEROL SULFATE SCH AMPUL: .5; 3 SOLUTION RESPIRATORY (INHALATION) at 10:15

## 2021-02-26 RX ADMIN — HEPARIN SODIUM SCH UNIT: 5000 INJECTION, SOLUTION INTRAVENOUS; SUBCUTANEOUS at 22:57

## 2021-02-26 RX ADMIN — FAMOTIDINE SCH MG: 20 TABLET ORAL at 11:25

## 2021-02-26 RX ADMIN — FUROSEMIDE SCH MG: 10 INJECTION, SOLUTION INTRAVENOUS at 18:44

## 2021-02-26 NOTE — PROGRESS NOTE
Assessment and Plan


Assessment and plan: 


Urinary tract infection


-2/26 urinalysis shows small leukocyte esterase and no nitrates


-2/26 urine culture pending


-Antibiotic therapy


-Supportive care





COPD


-Pulmonary consult, appreciate recommendations


-Steroid therapy


-Levaquin IV


-Pulmicort scheduled, Proventil as needed


-Supportive care


-Supplemental oxygen as needed


-Pulmonary hygiene


-Solu-Medrol





Abdominal distention


-2/25 bilateral lower extremity Doppler ultrasound shows no sonographic evidence

of DVT in either lower extremity


-2/26 CT abd/pelvis wihtout contrast pending


-2/26 Stool studies pending


-2/26 C. difficile pending


-GI consulted, appreciate recommendations





EtOH abuse


-Patient admits to drinking 9 beers per day and can go without alcohol intake 

for 4 days


-Seizure/fall/aspiration precautions


-CIWA protocol


-Supportive care


-Folate, Thiamine, multivitamin supplementation





Diastolic congestive heart failure


-2/25 proBNP 137


-2/26 echocardiogram shows normal global left ventricle systolic function, no 

left ventricular hypertrophy, estimated fraction 55 to 60%, trace MR, trace TR, 

moderate pleural effusion, no pericardial effusion, RVSP calculated 20 mmHg, 

right ventricular global systolic function is normal


Iron deficiency anemia


-Lasix


-Daily weights


-Strict intake and output





Anemia


-Presented with H/H of 10.5/31.9 with elevated MVC and MCH 


-2/26 iron studies: Iron 36, TIBC 91, percent saturation 18.85, transferrin 165


-Multivitamin





Hyponatremia


-Presented with a sodium of 130, corrected sodium 134


-Trend BMP


-Avoid rapid correction


-Monitor neuro status


-Aviod MIVF in setting of ABD distention bilateral lower extremity swelling





Hypochloremia


-Presented with a chloride of 88


-Trend BMP


-Aviod MIVF in setting of ABD distention bilateral lower extremity swelling





Morbid obesity


-Encourage dietary and lifestyle modifications


-Consider bariatric surgery outpatient





Hypertension


-Restart home hypertensive regimen and titrate as needed


-Blood pressure monitoring per protocol 


-Hydralazine 10 mg IVP for SBP greater than 160





LORE


-Continue home CPAP therapy


-Supportive care





Diabetes mellitus


-2/26 hemoglobin A1c 9.9


-Presented with hyperglycemia 334


-SSI


-Accu-Cheks AC at bedtime


-CC cardiac diet


-Initiate long-term insulin as needed





CAD


-Continue home statin therapy


-Supportive care





Hypoalbuminemia


-Presented with albumin of 3.2


-Nutrition consulted, appreciate recommendations





Tobacco abuse


-Former smoker


-Resume home NicoDerm patches





DVT prophylaxis


-GI prophylaxis


-Heparin subcu


-SCDs to bilateral lower extremities while in bed





History


Interval history: 


This is a 46-year-old male with hypertension, COPD, LORE on CPAP, CAD pulmonary 

hypertension, alcohol abuse and former tobacco abuse presented to the emergency 

department on 2/25, planing of swelling and pain to bilateral lower extremities 

and abdomen associated with shortness of breath and some intermittent chest 

discomfort which has been ongoing for 2 months and progressively worsening d

uring visit to emergency department via EMS.  Work-up in the emergency 

department revealed hyponatremia, hypochloremia, hyperglycemia and elevated 

liver enzymes.  Patient was admitted to the hospitalist service with consults to

pulmonology and gastroenterology.





2/26: Patient complains of a 2-month history of diarrhea EtOH abuse.  GI was 

consulted, CIWA protocol initiated, pulmonology was consulted.  Patient was 

started on steroids, multivitamins, and his medications were consulted.  Stool 

studies were ordered and a CT abdomen/pelvis is pending.





Hospitalist Physical





- Constitutional


Vitals: 


                                        











Temp Pulse Resp BP Pulse Ox


 


 98.9 F   108 H  20   137/74   95 


 


 02/26/21 04:18  02/26/21 13:27  02/26/21 13:27  02/26/21 11:25  02/26/21 10:26











General appearance: Present: no acute distress, mild distress, well-nourished, 

obese





- EENT


Eyes: Present: PERRL, EOM intact


ENT: hearing intact, clear oral mucosa, dentition normal





- Neck


Neck: Present: normal ROM





- Respiratory


Respiratory effort: normal


Respiratory: bilateral: diminished





- Cardiovascular


Rhythm: regular


Heart Sounds: Present: S1 & S2.  Absent: systolic murmur, diastolic murmur





- Extremities


Extremities: no ischemia, pulses intact, pulses symmetrical, normal temperature,

normal color


Extremity abnormal: edema





- Peripheral Assessment


  ** Bilateral Lower Extremity


Edema Type: Pitting


Edema Degree: 3+


Capillary Refill: < 3 seconds


Skin Temperature: Warm


Peripheral Pulses: within normal limits





- Abdominal


General gastrointestinal: soft, non-tender, distended, normal bowel sounds





- Integumentary


Integumentary: Present: clear, warm, dry





- Psychiatric


Psychiatric: cooperative





- Neurologic


Neurologic: CNII-XII intact, no focal deficits, moves all extremities





- Allied Health


Allied health notes reviewed: nursing





HEART Score





- HEART Score


Age: 45-65


Risk factors: 1-2 risk factors


Troponin: 


                                        











Troponin T  < 0.010 ng/mL (0.00-0.029)   02/25/21  16:29    











Troponin: < normal limit





- Critical Actions


Critical Actions: 0-3 pts:0.9-1.7%risk of adverse cardiac event.Candidate for 

discharge





Results





- Labs


CBC & Chem 7: 


                                 02/26/21 04:14





                                 02/26/21 04:14


Labs: 


                             Laboratory Last Values











WBC  5.9 K/mm3 (4.5-11.0)   02/26/21  04:14    


 


RBC  3.10 M/mm3 (3.65-5.03)  L  02/26/21  04:14    


 


Hgb  10.6 gm/dl (11.8-15.2)  L  02/26/21  04:14    


 


Hct  32.5 % (35.5-45.6)  L  02/26/21  04:14    


 


MCV  105 fl (84-94)  H  02/26/21  04:14    


 


MCH  34 pg (28-32)  H  02/26/21  04:14    


 


MCHC  33 % (32-34)   02/26/21  04:14    


 


RDW  14.8 % (13.2-15.2)   02/26/21  04:14    


 


Plt Count  244 K/mm3 (140-440)   02/26/21  04:14    


 


Lymph % (Auto)  4.9 % (13.4-35.0)  L  02/26/21  04:14    


 


Mono % (Auto)  6.6 % (0.0-7.3)   02/26/21  04:14    


 


Eos % (Auto)  0.2 % (0.0-4.3)   02/26/21  04:14    


 


Baso % (Auto)  0.3 % (0.0-1.8)   02/26/21  04:14    


 


Lymph # (Auto)  0.3 K/mm3 (1.2-5.4)  L  02/26/21  04:14    


 


Mono # (Auto)  0.4 K/mm3 (0.0-0.8)   02/26/21  04:14    


 


Eos # (Auto)  0.0 K/mm3 (0.0-0.4)   02/26/21  04:14    


 


Baso # (Auto)  0.0 K/mm3 (0.0-0.1)   02/26/21  04:14    


 


Seg Neutrophils %  88.0 % (40.0-70.0)  H  02/26/21  04:14    


 


Seg Neutrophils #  5.2 K/mm3 (1.8-7.7)   02/26/21  04:14    


 


PT  15.9 Sec. (12.2-14.9)  H  02/25/21  15:26    


 


INR  1.27  (0.87-1.13)  H  02/25/21  15:26    


 


APTT  33.4 Sec. (24.2-36.6)   02/25/21  15:26    


 


Sodium  132 mmol/L (137-145)  L  02/26/21  04:14    


 


Potassium  4.3 mmol/L (3.6-5.0)   02/26/21  04:14    


 


Chloride  91.0 mmol/L ()  L  02/26/21  04:14    


 


Carbon Dioxide  24 mmol/L (22-30)   02/26/21  04:14    


 


Anion Gap  21 mmol/L  02/26/21  04:14    


 


BUN  5 mg/dL (9-20)  L  02/26/21  04:14    


 


Creatinine  0.6 mg/dL (0.8-1.3)  L  02/26/21  04:14    


 


Estimated GFR  > 60 ml/min  02/26/21  04:14    


 


BUN/Creatinine Ratio  8 %  02/26/21  04:14    


 


Glucose  236 mg/dL ()  H  02/26/21  04:14    


 


POC Glucose  305 mg/dL ()  H  02/26/21  13:32    


 


Hemoglobin A1c  9.9 % (4-6)  H  02/26/21  04:14    


 


Calcium  8.8 mg/dL (8.4-10.2)   02/26/21  04:14    


 


Iron  36 ug/dL ()  L  02/26/21  07:47    


 


TIBC  191 mcg/dL (250-450)  L  02/26/21  07:47    


 


% Saturation  18.85 %  02/26/21  07:47    


 


Transferrin  165 mg/dl (180-329)  L  02/26/21  07:47    


 


Total Bilirubin  0.90 mg/dL (0.1-1.2)   02/26/21  04:14    


 


AST  45 units/L (5-40)  H  02/26/21  04:14    


 


ALT  23 units/L (7-56)   02/26/21  04:14    


 


Alkaline Phosphatase  203 units/L ()  H  02/26/21  04:14    


 


Troponin T  < 0.010 ng/mL (0.00-0.029)   02/25/21  16:29    


 


NT-Pro-B Natriuret Pep  137.0 pg/mL (0-450)   02/25/21  15:22    


 


Total Protein  6.7 g/dL (6.3-8.2)   02/26/21  04:14    


 


Albumin  3.3 g/dL (3.9-5)  L  02/26/21  04:14    


 


Albumin/Globulin Ratio  1.0 %  02/26/21  04:14    


 


Vitamin B12  448.7 pg/mL (211-911)   02/26/21  07:48    


 


TSH  2.590 mlU/mL (0.270-4.200)   02/25/21  16:55    


 


Urine Color  Heather  (Yellow)   02/26/21  02:22    


 


Urine Turbidity  Slightly-cloudy  (Clear)   02/26/21  02:22    


 


Urine pH  6.0  (5.0-7.0)   02/26/21  02:22    


 


Ur Specific Gravity  1.018  (1.003-1.030)   02/26/21  02:22    


 


Urine Protein  30 mg/dl mg/dL (Negative)   02/26/21  02:22    


 


Urine Glucose (UA)  >=500 mg/dL (Negative)   02/26/21  02:22    


 


Urine Ketones  20 mg/dL (Negative)   02/26/21  02:22    


 


Urine Blood  Lg  (Negative)   02/26/21  02:22    


 


Urine Nitrite  Neg  (Negative)   02/26/21  02:22    


 


Urine Bilirubin  Neg  (Negative)   02/26/21  02:22    


 


Urine Urobilinogen  2.0 mg/dL (<2.0)   02/26/21  02:22    


 


Ur Leukocyte Esterase  Sm  (Negative)   02/26/21  02:22    


 


Urine WBC (Auto)  70.0 /HPF (0.0-6.0)  H  02/26/21  02:22    


 


Urine RBC (Auto)  > 182.0 /HPF (0.0-6.0)   02/26/21  02:22    


 


U Epithel Cells (Auto)  < 1.0 /HPF (0-13.0)   02/26/21  02:22    


 


Urine Bacteria (Auto)  1+ /HPF (Negative)   02/26/21  02:22    


 


Hyaline Casts  2 /LPF  02/26/21  02:22    














- Diagnostic Impressions


Diagnostic Impressions: 








Echocardiogram  02/26/21 07:33


Transthoracic Echocardiogram


 


Indication:


Pulmonary HTN


BP:           135/75


HR:           113


 


Conclusions


 *The study quality is technically difficult. 


 *Global left ventricular systolic function is normal.


 *There is no left ventricular hypertrophy.


 *The estimated ejection fraction is 55-60%. 


 *The right ventricular global systolic function is normal.


 *There is trace of mitral regurgitation.


 *There is trace tricuspid regurgitation. 


 *There is no evidence of aortic regurgitation.


 *There is trace tricuspid regurgitation. 


 *The right ventricular systolic pressure is calculated at 22 mmHg. 


 *There is no evidence of pulmonic regurgitation.


 *There is a moderate pleural effusion. 


 *There is no pericardial effusion.


 


Findings     


Procedure Info:


The study quality is technically difficult.  The study is technically


limited due to patient body habitus.  


 


Left Ventricle:


The left ventricular chamber size is normal. There is no left


ventricular hypertrophy. Global left ventricular wall motion and


contractility are within normal limits. Global left ventricular systolic


function is normal. The estimated ejection fraction is 55-60%.  Abnormal


left ventricular diastolic function is observed.Indeterminate 


 


Left Atrium:


The left atrial chamber size is normal. 


 


Right Ventricle:


The right ventricular cavity size is normal. The right ventricular


global systolic function is normal. 


 


Right Atrium:


The right atrial cavity size is normal. 


 


Aortic Valve:


The aortic valve is not well visualized. There is no evidence of aortic


regurgitation. There is no evidence of aortic stenosis. 


 


Mitral Valve:


The mitral valve leaflets do not appear thickened. There is trace of


mitral regurgitation. 


 


Tricuspid Valve:


The tricuspid valve leaflets are normal.  There is trace tricuspid


regurgitation.  The right ventricular systolic pressure is calculated at


22 mmHg.  


 


Pulmonic Valve:


There is no evidence of pulmonic valve thickening. There is no evidence


of pulmonic regurgitation. 


 


Pericardium:


There is no pericardial effusion. There is a moderate pleural effusion. 


 


 


Aorta:


The aorta appears normal.  


 


Venous:


The inferior vena cava is not visualized. 


 


Contrast:


Intravenous contrast was used to enhance endocardial border definition.


 


 


Measurements     


Chambers 2D


Name                    Value         Normal Range            


IVSd (2D)               1.01 cm       (0.6 - 1.1)             


LVPWd (2D)              1.01 cm       (0.6 - 1.1)             


LVIDd (2D)              5.17 cm       (3.7 - 5.6)             


LVIDs (2D)              3.61 cm       (2 - 3.8)               


LV FS (2D)              30.2 %        -                        


EF Teichholz (2D)       57.16 %       -                        


Ao root diameter (2D)   3.24 cm       (2 - 3.7)               


 


Volumes/Mass


Name                    Value         Normal Range            


LA ESV SP 4CH (A/L)     61.61 ml      -                        


LA ESV SP 2CH (A/L)     54.93 ml      -                        


LA ESV BP (A/L)         58.99 ml      -                        


LA ESV BP (A/L) index   21.29 ml/m2   -                        


LA ESV SP 4CH (MOD)     57.96 ml      -                        


LA ESV SP 2CH (MOD)     53.09 ml      -                        


LA ESV BP (MOD)         56.17 ml      -                        


LA ESV BP (MOD) index   20.28 ml/m2   -                        


 


Aortic Valve


Name                    Value         Normal Range            


AV Vmax                 2.07 m/sec    -                        


AV VTI                  39.21 cm      -                        


AV peak gradient        17.18 mmHg    -                        


AV mean gradient        9.74 mmHg     -                        


LVOT diameter           2.08 cm       -                        


LVOT Vmax               1.73 m/sec    -                        


LVOT VTI                30.05 cm      -                        


LVOT peak gradient      11.91 mmHg    -                        


LVOT mean gradient      6.05 mmHg     -                        


SV LVOT                 101.88 ml     -                        


KARIS (continuity Vmax)   2.82 cm2      -                        


KARIS (continuity VTI)    2.6 cm2       -                        


Ascending Ao            3.06 cm       -                        


 


Mitral Valve


Name                    Value         Normal Range            


MV PHT                  43.16 msec    -                        


MVA (PHT)               5.1 cm2       -                        


 


Tricuspid Valve


Name                    Value         Normal Range            


TR Vmax                 1.87 m/sec    -                        


TR peak gradient        14 mmHg       -                        


RAP                     8 mmHg        -                        


RVSP                    22 mmHg       -                        


 


Pulmonic Valve/Qp:Qs


Name                    Value         Normal Range            


PV Vmax                 1.77 m/sec    -                        


PV peak gradient        12.54 mmHg    -                        


PV acceleration time    72.31 msec    -                        


 


Electronically signed by: Herbert Del Rio MD on 02/26/2021 11:15:28











Valdovinos/IV: 


                                        





Voiding Method                   Indwelling Catheter











Active Medications





- Current Medications


Current Medications: 














Generic Name Dose Route Start Last Admin





  Trade Name Freq  PRN Reason Stop Dose Admin


 


Acetaminophen  650 mg  02/25/21 23:06 





  Acetaminophen 325 Mg Tab  PO  





  Q4H PRN  





  Pain MILD(1-3)/Fever >100.5/HA  


 


Albuterol  2.5 mg  02/25/21 23:30 





  Albuterol 2.5 Mg/3 Ml Nebu  IH  





  Q3HRT PRN  





  Wheezing/ SOB  


 


Albuterol/Ipratropium  1 ampul  02/26/21 22:00 





  Ipratropium/Albuterol Sulfate 3 Ml Ampul.Neb  IH  





  BID MING  


 


Amlodipine Besylate  5 mg  02/26/21 10:00  02/26/21 11:31





  Amlodipine 5 Mg Tab  PO   5 mg





  QDAY [facility]   Administration


 


Arformoterol Tartrate  15 mcg  02/26/21 20:00 





  Arformoterol 15 Mcg/2 Ml Nebu  IH  





  Q12HRT MING  


 


Aspirin  325 mg  02/26/21 10:00  02/26/21 11:25





  Aspirin 325 Mg Tab  PO   325 mg





  QDAY [facility]   Administration


 


Benzonatate  100 mg  02/26/21 06:00  02/26/21 13:15





  Benzonatate 100 Mg Cap  PO   100 mg





  Q8HR MING   Administration


 


Budesonide  0.5 mg  02/26/21 20:00 





  Budesonide 0.5 Mg/2 Ml Nebu    





  Q12HRT [facility]  


 


Dextrose  50 ml  02/26/21 10:00 





  Dextrose 50% In Water (25gm) 50 Ml Syringe  IV  





  Q30MIN PRN  





  Hypoglycemia  





  Protocol  


 


Famotidine  20 mg  02/26/21 10:00  02/26/21 11:25





  Famotidine 20 Mg Tab  PO   20 mg





  BID MING   Administration


 


Fluticasone Propionate  50 mcg  02/26/21 10:00  02/26/21 11:24





  Fluticasone Propionate Nasal Spray 16 Gm  NS   50 mcg





  QDAY MING   Administration


 


Furosemide  40 mg  02/26/21 06:00  02/26/21 05:39





  Furosemide 40 Mg/4 Ml Inj  IV   40 mg





  0600,1800 MING   Administration


 


Heparin Sodium (Porcine)  5,000 unit  02/25/21 23:15  02/26/21 11:26





  Heparin 5,000 Unit/1 Ml Vial  SUB-Q   5,000 unit





  Q12HR MING   Administration


 


Hydromorphone HCl  0.5 mg  02/25/21 23:06 





  Hydromorphone 1 Mg/1 Ml Inj  IV  





  Q3H PRN  





  Pain , Severe (7-10)  


 


Levofloxacin/Dextrose  750 mg in 150 mls @ 100 mls/hr  02/26/21 10:00  02/26/21 

11:23





  Levaquin 750mg/150ml  IV   100 mls/hr





  Q24HR MING   Administration





  Protocol  


 


Insulin Human Lispro  0 unit  02/26/21 11:30  02/26/21 13:49





  Insulin Lispro 100 Unit/Ml  SUB-Q   1 unit





  [facility] MING   Administration





  Protocol  


 


Lisinopril  10 mg  02/26/21 10:00  02/26/21 11:25





  Lisinopril 10 Mg Tab  PO   10 mg





  QDAY MING   Administration


 


Methylprednisolone Sodium Succinate  40 mg  02/26/21 22:00 





  Methylprednisolone Sod Succinate 40 Mg/1 Ml Inj  IV  





  BID MING  


 


Nicotine  21 mg  02/26/21 10:00  02/26/21 11:24





  Nicotine 21 Mg/24 Hr Patch  TD   21 mg





  QDAY MING   Administration


 


Ondansetron HCl  4 mg  02/25/21 23:06 





  Ondansetron 4 Mg/2 Ml Inj  IV  





  Q8H PRN  





  Nausea And Vomiting  


 


Oxycodone/Acetaminophen  1 tab  02/25/21 23:06 





  Oxycodone /Acetaminophen 5-325mg Tab  PO  





  Q6H PRN  





  Pain, Moderate (4-6)  


 


Potassium Chloride  20 meq  02/26/21 10:00  02/26/21 11:25





  Potassium Chloride Er 20 Meq Tab  PO   20 meq





  Q12HR MING   Administration


 


Pravastatin Sodium  10 mg  02/26/21 22:00 





  Pravastatin 20 Mg Tab  PO  





  [facility]HS MING  


 


Sodium Chloride  10 ml  02/26/21 10:00  02/26/21 11:31





  Sodium Chloride 0.9% 10 Ml Flush Syringe  IV   10 ml





  BID MING   Administration


 


Sodium Chloride  10 ml  02/25/21 23:06  02/26/21 05:40





  Sodium Chloride 0.9% 10 Ml Flush Syringe  IV   10 ml





  PRN PRN   Administration





  LINE FLUSH  














Nutrition/Malnutrition Assess





- Dietary Evaluation


Nutrition/Malnutrition Findings: 


                                        





Nutrition Notes                                            Start:  02/26/21 

11:38


Freq:                                                      Status: Active       




Protocol:                                                                       




 Document     02/26/21 11:38  CW  (Rec: 02/26/21 11:44  CW  [id number])


 Nutrition Notes


     Need for Assessment generated from:         MD Order,Education


     Initial or Follow up                        Assessment


     Current Diagnosis                           COPD,Coronary Artery Disease,


                                                 Hypertension


     Other Pertinent Diagnosis                   Anemia


     Current Diet                                Cardiac


     Labs/Tests                                   on admission (02/25/ 2021)


                                                 hgbA1c 9.9


                                                 BP on admission 144/71 (02/25/ 2021)


     Pertinent Medications                       Lasix


                                                 Solumedrol


     Height                                      5 ft 9 in


     Weight                                      181.437 kg


     Ideal Body Weight (kg)                      72.72


     BMI                                         59.1


     Intake Prior to Admission                   Poor


     Weight change and time frame                12% weight gain x 1 year


                                                 -1.3% weight x 60 days loss


                                                 per pt


     Weight Status                               Morbidly Obese


     Subjective/Other Information                MD consult for diet education.


                                                 Elevated hbgA1c prompting


                                                 need for DM related diet


                                                 education. Pt stating not


                                                 having much knowledge related


                                                 to hgbA1c and DM. RD provided


                                                 information. Pt also given


                                                 heart healthy diet education


                                                 related to complaints of edema


                                                 . Pt reports poor appetite x


                                                 60 days related to diarrhea


                                                 and slight weight loss d/t


                                                 decreased PO intake x 60 days.


                                                 Food requests noted and


                                                 written on meal ticket.


     Percent of energy/protein needs met:        0%/0%


     Burn                                        Absent


     Trauma                                      Absent


     GI Symptoms                                 Diarrhea


     Food Allergy                                No


     Current % PO                                Negligible


     Minimum of two criteria                     No


     Fluid Accumulation                          Mild (non-severe)


     #2


      Nutrition Diagnosis                        Food and nutrition-related


                                                 knowledge deficit


      Etiology                                   no previous diet education


      As Evidenced by Signs and Symptoms         Pt states no previous diet


                                                 education on Diabetes, hgbA1c,


                                                 nor heart healthy


     #1


      Nutrition Diagnosis                        Inadequate oral intake


      Etiology                                   acute illness


      As Evidenced by Signs and Symptoms         pt reports loss of appetite, 0


                                                 % of breakfast eaten, slight


                                                 weight loss


     Is patient on ventilator?                   No


     Is Patient Ambulatory and/or Out of Bed     Yes


     REE-([facility]-ambulatory/OOB) [     3496.662


      [email or web address]]                               


     Kcal/Kg value to use for calculation        16


     Approximate Energy Requirements Using       2903


      kcal/Kg                                    


     Calculation Used for Recommendations        Kcal/kg


     Additional Notes                            protein needs:102 - 127g (0.8


                                                 - 1g/kgAdjBW 127.08kg)


                                                 fluid needs: 1 ml/kcal


 Nutrition Intervention


     Change Diet Order:                          Change diet to cardiac


                                                 consistent carbohydrate diet


     Teaching Recipient                          Patient


     Learning Readiness                          Good


     Teaching Methods                            Discussion,Handout


     Response to Teaching                        Verbalize understanding


     Education Handouts Provided                 Carbohydrate Counting


                                                 Cardiac Diet


     Barriers to Learning                        No Barriers


     RD phone number provided                    Yes


     Patient aware of follow up options          Yes


     Goal #1                                     PO intake that meets at least


                                                 75% of kcal and protein needs


     Goal #2                                     Understand the importance of


                                                 followig a cardiac/consistent


                                                 carbohydrate diet


     Anticipated Discharge Needs:                Cardiac/Consistent


                                                 Carbohydrate Diet


     Follow-Up By:                               03/01/21


     Additional Comments                         F/U PO intakes

## 2021-02-26 NOTE — CAT SCAN REPORT
CT abdomen pelvis wo con



INDICATION:

BLE and abd swelling. 



COMPARISON: None



TECHNIQUE: Abdominal and pelvic CT exam performed. All CT scans at this location are performed using 
CT dose reduction for ALARA by means of automated exposure control.



FINDINGS:



CT ABDOMEN and PELVIS:

Lung Bases: Dependent atelectasis.

Liver: Decreased attenuation consistent with hepatic steatosis.

Biliary: No significant abnormality.

Spleen: Enlarged measuring 17 cm in anterior posterior dimension.

Pancreas: No significant abnormality.

Adrenals: No significant abnormality.

Kidneys: No significant abnormality.

Lymphatics: No lymphadenopathy.

Vasculature: No significant abnormality. 

Bowel: No significant abnormality.  Appendix is nonvisualized. However, no inflammatory changes in th
e right lower quadrant to suggest appendicitis.

Pelvis: No significant abnormality.

Osseous Structures: No aggressive osseous lesion.

Additional Findings: Moderate volume of ascites.



IMPRESSION:

1. Diffuse hepatic steatosis. Mild splenomegaly and moderate volume of ascites which is possibly rela
rachel to underlying cirrhosis. Correlate clinically



Signer Name: Norm Brown MD 

Signed: 2/26/2021 5:35 PM

Workstation Name: [id number]

## 2021-02-26 NOTE — GASTROENTEROLOGY CONSULTATION
History of Present Illness





- Reason for Consult


Consult date: 02/26/21


abd distention, diarrhea


Requesting physician: VICKIE BLEDSOE





- History of Present Illness





The patient is a 47 yo male with h/o morbid obesity, pulmonary htn and alcohol 

abuse who presents with progressive extremity (legs and arms) swelling, abd 

distention and sob.  pt has had progressive swelling and abd distention last few

weeks and has recently been unable to ambulate due to swelling.  he has had 

progressive abd distention as well.  reports diarrhea x 2 months, worse after 

meals, liquid bm's with occasional blood in camode and toilet paper with wipes. 

reports consuming ~9 beers per day, previously more.  





Past History


Past Medical History: other (pulmonary htn, morbid obesity)


Past Surgical History: No surgical history


Social history: alcohol abuse


Family history: no significant family history





Medications and Allergies


                                    Allergies











Allergy/AdvReac Type Severity Reaction Status Date / Time


 


Iodinated Contrast Media Allergy  Rash Verified 07/08/14 21:15











                                Home Medications











 Medication  Instructions  Recorded  Confirmed  Last Taken  Type


 


Aspirin 325 mg PO QDAY #30 tablet 07/10/14 02/04/19 Unknown Rx


 


Famotidine [Pepcid] 20 mg PO BID #60 tablet 07/10/14 02/04/19 Unknown Rx


 


Pravastatin Sodium [Pravastatin] 10 mg PO QHS #30 tablet 07/10/14 02/04/19 

Unknown Rx


 


amLODIPine 5 mg PO QDAY #30 tablet 07/10/14 02/04/19 Unknown Rx


 


lisinopriL [Zestril TAB] 10 mg PO QDAY #30 tablet 07/10/14 02/04/19 02/03/19 Rx


 


ALBUTEROL Inhaler(NF) [VENTOLIN 2 puff IH QID PRN #1 inha 02/06/19  Unknown Rx





Inhaler(NF)]     


 


Benzonatate [Tessalon Perles] 100 mg PO Q8HR #30 capsule 02/06/19  Unknown Rx


 


Nicotine [Habitrol] 21 mg TD QDAY #30 patch 02/06/19  Unknown Rx


 


Prednisone [predniSONE 10 mg 10 mg PO .TAPER #1 tab.ds.pk 02/06/19  Unknown Rx





(6-Day Pack, 21 Tabs)]     


 


Acetaminophen [Non-Aspirin Extra 500 mg PO Q6HR PRN #30 tablet 04/08/20  Unknown

 Rx





Strength]     


 


Albuterol Sulfate [Proair 90 mcg IH Q4HR PRN #2 aer.pow.ba 04/08/20  Unknown Rx





Respiclick]     


 


DOXYCYCLINE Hyclate [Vibramycin] 100 mg PO Q12HR #10 capsule 04/08/20  Unknown 

Rx


 


Famotidine [Pepcid] 20 mg PO BID #60 tablet 04/08/20  Unknown Rx


 


Fluticasone [Flonase] 1 spray NS QDAY #1 bottle 04/08/20  Unknown Rx


 


Ipratropium (Nf) [Atrovent] 2 puff IH Q6HR PRN #1 inha 04/08/20  Unknown Rx


 


Ipratropium [Atrovent NEB] 0.5 mg IH Q4HR #2 ml 04/08/20  Unknown Rx


 


predniSONE [Deltasone] 40 mg PO QDAY #8 tab 04/08/20  Unknown Rx











Active Meds: 


Active Medications





Acetaminophen (Acetaminophen 325 Mg Tab)  650 mg PO Q4H PRN


   PRN Reason: Pain MILD(1-3)/Fever >100.5/HA


Albuterol (Albuterol 2.5 Mg/3 Ml Nebu)  2.5 mg IH Q3HRT PRN


   PRN Reason: Wheezing/ SOB


Albuterol/Ipratropium (Ipratropium/Albuterol Sulfate 3 Ml Ampul.Neb)  1 ampul IH

 BID [facility]


Amlodipine Besylate (Amlodipine 5 Mg Tab)  5 mg PO QDAY [facility]


   Last Admin: 02/26/21 11:31 Dose:  5 mg


   Documented by: 


Arformoterol Tartrate (Arformoterol 15 Mcg/2 Ml Nebu)  15 mcg IH Q12HRT [facility]


Aspirin (Aspirin 325 Mg Tab)  325 mg PO QDAY [facility]


   Last Admin: 02/26/21 11:25 Dose:  325 mg


   Documented by: 


Benzonatate (Benzonatate 100 Mg Cap)  100 mg PO Q8HR [facility]


   Last Admin: 02/26/21 13:15 Dose:  100 mg


   Documented by: 


Budesonide (Budesonide 0.5 Mg/2 Ml Nebu)  0.5 mg IH Q12HRT [facility]


Chlordiazepoxide HCl (Chlordiazepoxide 25 Mg Cap)  50 mg PO Q1HR PRN


   PRN Reason: CIWA-Ar 8-15


Chlordiazepoxide HCl (Chlordiazepoxide 25 Mg Cap)  100 mg PO Q1HR PRN


   PRN Reason: CIWA-Ar 16-25


Dextrose (Dextrose 50% In Water (25gm) 50 Ml Syringe)  50 ml IV Q30MIN PRN; 

Protocol


   PRN Reason: Hypoglycemia


Famotidine (Famotidine 20 Mg Tab)  20 mg PO BID [facility]


   Last Admin: 02/26/21 11:25 Dose:  20 mg


   Documented by: 


Fluticasone Propionate (Fluticasone Propionate Nasal Spray 16 Gm)  50 mcg NS 

QDAY [facility]


   Last Admin: 02/26/21 11:24 Dose:  50 mcg


   Documented by: 


Furosemide (Furosemide 40 Mg/4 Ml Inj)  40 mg IV 0600,1800 [facility]


   Last Admin: 02/26/21 05:39 Dose:  40 mg


   Documented by: 


Heparin Sodium (Porcine) (Heparin 5,000 Unit/1 Ml Vial)  5,000 unit SUB-Q Q12HR 

[facility]


   Last Admin: 02/26/21 11:26 Dose:  5,000 unit


   Documented by: 


Hydromorphone HCl (Hydromorphone 1 Mg/1 Ml Inj)  0.5 mg IV Q3H PRN


   PRN Reason: Pain , Severe (7-10)


Levofloxacin/Dextrose (Levaquin 750mg/150ml)  750 mg in 150 mls @ 100 mls/hr IV 

Q24HR [facility]; Protocol


   Last Admin: 02/26/21 11:23 Dose:  100 mls/hr


   Documented by: 


Insulin Human Lispro (Insulin Lispro 100 Unit/Ml)  0 unit SUB-Q ACHS [facility]; 

Protocol


   Last Admin: 02/26/21 16:56 Dose:  2 unit


   Documented by: 


Lisinopril (Lisinopril 10 Mg Tab)  10 mg PO QDAY [facility]


   Last Admin: 02/26/21 11:25 Dose:  10 mg


   Documented by: 


Methylprednisolone Sodium Succinate (Methylprednisolone Sod Succinate 40 Mg/1 Ml

 Inj)  40 mg IV BID [facility]


Multivit/Ca Carb/B Cmplx/FA/Prenat (Folic Acid/Vit B Comp W-C 1 Mg (Renal Caps))

  1 cap PO QDAY [facility]


Multivitamins (Multivitamins ,Therapeutic Tab)  1 each PO QDAY [facility]


Nicotine (Nicotine 21 Mg/24 Hr Patch)  21 mg TD QDAY [facility]


   Last Admin: 02/26/21 11:24 Dose:  21 mg


   Documented by: 


Ondansetron HCl (Ondansetron 4 Mg/2 Ml Inj)  4 mg IV Q8H PRN


   PRN Reason: Nausea And Vomiting


Oxycodone/Acetaminophen (Oxycodone /Acetaminophen 5-325mg Tab)  1 tab PO Q6H PRN


   PRN Reason: Pain, Moderate (4-6)


Potassium Chloride (Potassium Chloride Er 20 Meq Tab)  20 meq PO Q12HR [facility]


   Last Admin: 02/26/21 11:25 Dose:  20 meq


   Documented by: 


Pravastatin Sodium (Pravastatin 20 Mg Tab)  10 mg PO QHS [facility]


Sodium Chloride (Sodium Chloride 0.9% 10 Ml Flush Syringe)  10 ml IV BID [facility]


   Last Admin: 02/26/21 11:31 Dose:  10 ml


   Documented by: 


Sodium Chloride (Sodium Chloride 0.9% 10 Ml Flush Syringe)  10 ml IV PRN PRN


   PRN Reason: LINE FLUSH


   Last Admin: 02/26/21 05:40 Dose:  10 ml


   Documented by: 


Thiamine HCl (Thiamine 100 Mg Tab)  100 mg PO QDAY [facility]





Reviewed/updated patient's home and current medications. 





Review of Systems





- Review of Systems


All systems: negative (per HPI)





Exam





- Constitutional


Vital Signs: 


                                        











Temp Pulse Resp BP Pulse Ox


 


 98.9 F   108 H  20   137/74   95 


 


 02/26/21 04:18  02/26/21 13:27  02/26/21 13:27  02/26/21 11:25  02/26/21 10:26











General appearance: obese





- EENT


Eyes: PERRL, EOM intact





- Respiratory


Respiratory: bilateral: diminished





- Cardiovascular


Rhythm: regular


Heart Sounds: Present: S1 & S2


Extremity abnormal: edema





- Gastrointestinal


General gastrointestinal: Present: non-tender, distended





- Neurologic


Neurological: alert and oriented x3





- Labs


CBC & Chem 7: 


                                 02/26/21 04:14





                                 02/26/21 04:14


Lab Results: 


                         Laboratory Results - last 24 hr











  02/26/21 02/26/21 02/26/21





  02:22 04:14 04:14


 


WBC   5.9 


 


RBC   3.10 L 


 


Hgb   10.6 L 


 


Hct   32.5 L 


 


MCV   105 H 


 


MCH   34 H 


 


MCHC   33 


 


RDW   14.8 


 


Plt Count   244 


 


Lymph % (Auto)   4.9 L 


 


Mono % (Auto)   6.6 


 


Eos % (Auto)   0.2 


 


Baso % (Auto)   0.3 


 


Lymph # (Auto)   0.3 L 


 


Mono # (Auto)   0.4 


 


Eos # (Auto)   0.0 


 


Baso # (Auto)   0.0 


 


Seg Neutrophils %   88.0 H 


 


Seg Neutrophils #   5.2 


 


Sodium    132 L


 


Potassium    4.3


 


Chloride    91.0 L


 


Carbon Dioxide    24


 


Anion Gap    21


 


BUN    5 L


 


Creatinine    0.6 L


 


Estimated GFR    > 60


 


BUN/Creatinine Ratio    8


 


Glucose    236 H


 


POC Glucose   


 


Hemoglobin A1c   


 


Calcium    8.8


 


Iron   


 


TIBC   


 


% Saturation   


 


Transferrin   


 


Total Bilirubin    0.90


 


AST    45 H


 


ALT    23


 


Alkaline Phosphatase    203 H


 


Total Protein    6.7


 


Albumin    3.3 L


 


Albumin/Globulin Ratio    1.0


 


Vitamin B12   


 


Urine Color  Heather  


 


Urine Turbidity  Slightly-cloudy  


 


Urine pH  6.0  


 


Ur Specific Gravity  1.018  


 


Urine Protein  30 mg/dl  


 


Urine Glucose (UA)  >=500  


 


Urine Ketones  20  


 


Urine Blood  Lg  


 


Urine Nitrite  Neg  


 


Urine Bilirubin  Neg  


 


Urine Urobilinogen  2.0  


 


Ur Leukocyte Esterase  Sm  


 


Urine WBC (Auto)  70.0 H  


 


Urine RBC (Auto)  > 182.0  


 


U Epithel Cells (Auto)  < 1.0  


 


Urine Bacteria (Auto)  1+  


 


Hyaline Casts  2  














  02/26/21 02/26/21 02/26/21





  04:14 07:47 07:48


 


WBC   


 


RBC   


 


Hgb   


 


Hct   


 


MCV   


 


MCH   


 


MCHC   


 


RDW   


 


Plt Count   


 


Lymph % (Auto)   


 


Mono % (Auto)   


 


Eos % (Auto)   


 


Baso % (Auto)   


 


Lymph # (Auto)   


 


Mono # (Auto)   


 


Eos # (Auto)   


 


Baso # (Auto)   


 


Seg Neutrophils %   


 


Seg Neutrophils #   


 


Sodium   


 


Potassium   


 


Chloride   


 


Carbon Dioxide   


 


Anion Gap   


 


BUN   


 


Creatinine   


 


Estimated GFR   


 


BUN/Creatinine Ratio   


 


Glucose   


 


POC Glucose   


 


Hemoglobin A1c  9.9 H  


 


Calcium   


 


Iron   36 L 


 


TIBC   191 L 


 


% Saturation   18.85 


 


Transferrin   165 L 


 


Total Bilirubin   


 


AST   


 


ALT   


 


Alkaline Phosphatase   


 


Total Protein   


 


Albumin   


 


Albumin/Globulin Ratio   


 


Vitamin B12    448.7


 


Urine Color   


 


Urine Turbidity   


 


Urine pH   


 


Ur Specific Gravity   


 


Urine Protein   


 


Urine Glucose (UA)   


 


Urine Ketones   


 


Urine Blood   


 


Urine Nitrite   


 


Urine Bilirubin   


 


Urine Urobilinogen   


 


Ur Leukocyte Esterase   


 


Urine WBC (Auto)   


 


Urine RBC (Auto)   


 


U Epithel Cells (Auto)   


 


Urine Bacteria (Auto)   


 


Hyaline Casts   














  02/26/21





  13:32


 


WBC 


 


RBC 


 


Hgb 


 


Hct 


 


MCV 


 


MCH 


 


MCHC 


 


RDW 


 


Plt Count 


 


Lymph % (Auto) 


 


Mono % (Auto) 


 


Eos % (Auto) 


 


Baso % (Auto) 


 


Lymph # (Auto) 


 


Mono # (Auto) 


 


Eos # (Auto) 


 


Baso # (Auto) 


 


Seg Neutrophils % 


 


Seg Neutrophils # 


 


Sodium 


 


Potassium 


 


Chloride 


 


Carbon Dioxide 


 


Anion Gap 


 


BUN 


 


Creatinine 


 


Estimated GFR 


 


BUN/Creatinine Ratio 


 


Glucose 


 


POC Glucose  305 H


 


Hemoglobin A1c 


 


Calcium 


 


Iron 


 


TIBC 


 


% Saturation 


 


Transferrin 


 


Total Bilirubin 


 


AST 


 


ALT 


 


Alkaline Phosphatase 


 


Total Protein 


 


Albumin 


 


Albumin/Globulin Ratio 


 


Vitamin B12 


 


Urine Color 


 


Urine Turbidity 


 


Urine pH 


 


Ur Specific Gravity 


 


Urine Protein 


 


Urine Glucose (UA) 


 


Urine Ketones 


 


Urine Blood 


 


Urine Nitrite 


 


Urine Bilirubin 


 


Urine Urobilinogen 


 


Ur Leukocyte Esterase 


 


Urine WBC (Auto) 


 


Urine RBC (Auto) 


 


U Epithel Cells (Auto) 


 


Urine Bacteria (Auto) 


 


Hyaline Casts 














Assessment and Plan


1. Abd distention - likely abd wall swelling but also ? ascites.  CT scan 

pending per primary.  has h/o alcohol abuse so unable to r/o liver disease.


2. Diarrhea - appears chronic (2 months), stool studies pending per primary


3. Hematochezia - with wipes suggestive of outlet/hemorrhoidal source.  H/H 

stable.  no overt bleeding





will follow

## 2021-02-26 NOTE — CONSULTATION
PULMONARY CONSULT NOTE



CONSULTING PHYSICIAN:  Dr. Severino.



REASON FOR CONSULTATION:  COPD.



CHIEF COMPLAINT AND HISTORY OF PRESENT ILLNESS:  The patient is a now

46-year-old morbidly obese male with a past medical history significant amongst

other things for a diagnosis of COPD and pulmonary hypertension, who was brought

in via EMS from his home with complaints of generalized swelling, especially to

his lower extremities bilaterally, complained of pain to both legs and feet with

associated shortness of breath and some intermittent chest tightness, a feeling

of being unable to get the oxygen, the air into his chest.  It had been going on

for about a couple of months.  In particular, the abdominal pain, but was

becoming more intense, so he came into the Emergency Room.  He denies any

relationship to meals with his pain.  He is unable to define to me relieving or

aggravating factors.  He is not on home oxygen therapy.  He does have a 10+ pack

year tobacco smoking history, but tells me he quit smoking in the past 1 year. 

He also has a history of obstructive sleep apnea for which he is on CPAP

therapy.  He was evaluated in the Emergency Room.  He states he has been taking

his medications, which included his home Lasix diuretic dose.  Post-evaluation,

he was admitted for further workup of the abdominal pain and we are asked to

assist with management of the COPD.  When I stopped by to see him, he was

resting in bed, lying flat in bed, in no significant orthopnea, he was getting a

breathing treatment.  He denied gross or streaky hemoptysis.  He denied any

known contacts with anyone with COVID-19 infection.  He denies any other

bleeding disorder.  The above is as much of the history of presentation as I

have.



PAST MEDICAL HISTORY:  Again, morbid obesity, chronic obstructive pulmonary

disease, pulmonary hypertension, history of coronary artery disease,

questionable history of heart failure.



PAST SURGICAL HISTORY:  Unknown medications.  He does deny any intraabdominal

surgery.



MEDICATIONS:  He was on at the time I stopped by to see him, according to the

medication administration record included the following:  Tylenol 650 mg p.o. q.

4 hours p.r.n. mild pain or fevers, DuoNeb nebulizer treatments nebulized

q.i.d., amlodipine 5 mg p.o. daily, aspirin 325 mg p.o. daily, benzonatate

Tessalon Perles 100 mg p.o. q. 8 hours, Rocephin 1 gram IV daily, Pepcid 20 mg

p.o. b.i.d., Flonase 50 mcg each nostril daily, Lasix 40 mg IV b.i.d., heparin

5000 units subQ q. 12 hours, Dilaudid 0.5 mg IV q. 3 hours p.r.n. for severe

pain, insulin via sliding scale, Levaquin 750 mg IV daily, Zestril 10 mg p.o.

daily, Solu-Medrol 40 mg IV q. 8 hours, nicotine 21 mg transdermal patch daily

and Percocet 5/325 mg p.o. q. 6 hours p.r.n. moderate pain, potassium chloride

20 mEq p.o. q. 12 hours, Pravachol 10 mg p.o. at bedtime.



ALLERGIES:  IODINE.  Nature of this allergy is unknown.



DIET:  Morbidly obese.  Denies acute weight loss or gain in the preceding few

weeks to months.



FAMILY AND SOCIAL HISTORY:  Lives in the community, has a 10+ pack year tobacco

smoking history.  Denies alcohol or illicit drug use or abuse.



FAMILY HISTORY:  Otherwise, noncontributory.



REVIEW OF SYSTEMS:  No loss of consciousness.  No new onset seizures.  No new

onset focal weakness.  He does complain of hematochezia.  Denies melena.  He

denies hematemesis.  He denies hemoptysis.  He denies heat or cold intolerance. 

Denies polydipsia.  Denies polyuria.  Denies any real fevers or chills. 

Complete 13-system review of systems obtained.  Pertinent positives and/or

negatives as in body of history above, otherwise noncontributory.



PHYSICAL EXAMINATION:

VITAL SIGNS:  At presentation, he was afebrile, temperature 98.1 degrees

Fahrenheit, pulse of 113, respiratory rate of 20, blood pressure 141/60, O2 sats

at the time I saw him was 99%, that was on 1 liter nasal cannula.

GENERAL:  Middle-aged, morbidly obese male.  Normocephalic, atraumatic, talking

to me with mostly full sentences, but with mildly increased respiratory effort

at rest.

HEAD, EYES, EARS, NOSE AND THROAT:  Anicteric.  No conjunctival erythema. 

Oropharynx was moist.  Mallampati #4 oropharynx.  No gross jugular venous

distention, no thyromegaly.  He does have a large neck circumference.  Grossly,

there were no palpable lymph nodes in the supraclavicular or submandibular lymph

node chains.

LUNGS:  Auscultation of both lung fields significant for diminished bilateral

breath sounds, prolonged expiratory phase; however, no wheezing.

HEART:  Heart sounds 1 and 2 are heard, regular rate and rhythm at the time of

my evaluation without overt rubs or murmurs.

ABDOMEN:  Soft, full, protuberant.  Bowel sounds are positive, but hypoactive. 

He is tender to touch diffusely really.  No palpable hepatosplenomegaly, but I

was unable to really press down on the abdomen.

EXTREMITIES:  Without overt digital clubbing or cyanosis.  He does have trace

pedal edema bilaterally.

NEUROLOGIC:  Pupils are equal, round, about 3 mm, reactive to light. 

Extraocular muscle movements are intact.  He moves all 4 extremities

spontaneously.

SKIN:  Normal turgor in the areas examined without overt cellulitis or rash. 

Please see the wound care nurse's notes for full description of his skin.

PSYCHIATRIC:  His mood was normal.  His affect was anxious.  He had intact

judgment and insight.



LABORATORY DATA:  From my review are as follows:  Admission white cell count

6700, hemoglobin 10.5, hematocrit 31.9, platelet count 286.  No manual

differential.  INR 1.27.  Serum sodium 130, potassium 5.0, chloride 89,

bicarbonate 24, BUN 6, creatinine 0.8, glucose was 334, AST up at 50, alkaline

phosphatase slightly elevated at 214, albumin 3.2.  Urinalysis was done, which

showed large amount of blood, small leukocyte esterase and 70 white cells per

high power field.  No microbiology studies have been done.  Radiographic studies

have been reviewed.  A chest x-ray was done that essentially shows slight

hyperinflation with overall small lung volumes compared to his body habitus.  No

acute findings.  No change since 04/2020.  An echocardiogram was also done, the

report is pending.  Bilateral lower extremity Dopplers were done, they were

negative for DVT.



ASSESSMENT:

1.  Abdominal pain, unspecified.

2.  Acute exacerbation of chronic obstructive pulmonary disease.

3.  Possible heart failure.

4.  Morbid obesity.

5.  Obstructive sleep apnea.

6.  Pulmonary hypertension.

7.  Anemia that is microcytic.



PLAN:  I do agree with current treatments.  I will, however, reduce the

frequency of the DuoNeb treatments to b.i.d. and add long-acting bronchodilators

as well as inhaled corticosteroids.  I will reduce the Solu-Medrol to 40 mg IV

q. 12 and taper quickly from there.  A CT of the abdomen and pelvis has been

ordered that is appropriate.  A General Surgery consult should be considered to

evaluate the abdomen just based on the clinical examination, he also seems to

have a urinary tract infection based on the urinalysis.  I will go ahead and

order urine cultures.  In the meantime, we will continue therapy with Levaquin. 

I will also get a CRP level despite the normal white count, just to help aid

clinical decision making.  He is appropriately on GI and DVT prophylaxis.  I

will begin empiric bilevel positive air pressure ventilation therapy 18/10 with

a backup rate of 10 at bedtime.  Flu and pneumonia vaccination will be addressed

per protocol.



Thank you very much for the consult.  We will follow along.  I should mention

continued tobacco abstinence has been counseled.  We will continue tobacco

replacement as it seems that he may have given the ordering physician a

different tobacco smoking history.





DD: 02/26/2021 13:57

DT: 02/26/2021 20:37

JOB# [id number]  [id number]

ZHEN/SHAY MOSCOSO

## 2021-02-26 NOTE — EVENT NOTE
Date: 02/26/21





Patient has swelling of the abdomen and lower extremities, she is also 

complaining diarrhea for the last 2 months.  CT abdomen and pelvis with and 

without contrast ordered.  Stool work-up ordered.  GI consult placed.  Patient 

has shortness of breath due to COPD and pulmonary is consulted.  Echo ordered 

and reading pending.  BNP is low and suspicion for CHF is low.

## 2021-02-26 NOTE — CONSULTATION
History of Present Illness


Consult date: 02/26/21


Requesting physician: YAMILETH VALDEZ


Reason for consult: COPD


History of present illness: 





PCCM CONSULT NOTE (Full dictation # [id number])





Please see dictated notes for full details





Medications and Allergies


                                    Allergies











Allergy/AdvReac Type Severity Reaction Status Date / Time


 


Iodinated Contrast Media Allergy  Rash Verified 07/08/14 21:15











                                Home Medications











 Medication  Instructions  Recorded  Confirmed  Last Taken  Type


 


Aspirin 325 mg PO QDAY #30 tablet 07/10/14 02/04/19 Unknown Rx


 


Famotidine [Pepcid] 20 mg PO BID #60 tablet 07/10/14 02/04/19 Unknown Rx


 


Pravastatin Sodium [Pravastatin] 10 mg PO QHS #30 tablet 07/10/14 02/04/19 

Unknown Rx


 


amLODIPine 5 mg PO QDAY #30 tablet 07/10/14 02/04/19 Unknown Rx


 


lisinopriL [Zestril TAB] 10 mg PO QDAY #30 tablet 07/10/14 02/04/19 02/03/19 Rx


 


ALBUTEROL Inhaler(NF) [VENTOLIN 2 puff IH QID PRN #1 inha 02/06/19  Unknown Rx





Inhaler(NF)]     


 


Benzonatate [Tessalon Perles] 100 mg PO Q8HR #30 capsule 02/06/19  Unknown Rx


 


Nicotine [Habitrol] 21 mg TD QDAY #30 patch 02/06/19  Unknown Rx


 


Prednisone [predniSONE 10 mg 10 mg PO .TAPER #1 tab.ds.pk 02/06/19  Unknown Rx





(6-Day Pack, 21 Tabs)]     


 


Acetaminophen [Non-Aspirin Extra 500 mg PO Q6HR PRN #30 tablet 04/08/20  Unknown

 Rx





Strength]     


 


Albuterol Sulfate [Proair 90 mcg IH Q4HR PRN #2 aer.[id number].ba 04/08/20  Unknown Rx





Respiclick]     


 


DOXYCYCLINE Hyclate [Vibramycin] 100 mg PO Q12HR #10 capsule 04/08/20  Unknown 

Rx


 


Famotidine [Pepcid] 20 mg PO BID #60 tablet 04/08/20  Unknown Rx


 


Fluticasone [Flonase] 1 spray NS QDAY #1 bottle 04/08/20  Unknown Rx


 


Ipratropium (Nf) [Atrovent] 2 puff IH Q6HR PRN #1 inha 04/08/20  Unknown Rx


 


Ipratropium [Atrovent NEB] 0.5 mg IH Q4HR #2 ml 04/08/20  Unknown Rx


 


predniSONE [Deltasone] 40 mg PO QDAY #8 tab 04/08/20  Unknown Rx











Active Meds: 


Active Medications





Acetaminophen (Acetaminophen 325 Mg Tab)  650 mg PO Q4H PRN


   PRN Reason: Pain MILD(1-3)/Fever >100.5/HA


Albuterol (Albuterol 2.5 Mg/3 Ml Nebu)  2.5 mg IH Q3HRT PRN


   PRN Reason: Wheezing/ SOB


Albuterol/Ipratropium (Ipratropium/Albuterol Sulfate 3 Ml Ampul.Neb)  1 ampul IH

 QIDRT [facility]


   Last Admin: 02/26/21 13:27 Dose:  1 ampul


   Documented by: 


Amlodipine Besylate (Amlodipine 5 Mg Tab)  5 mg PO QDAY [facility]


   Last Admin: 02/26/21 11:31 Dose:  5 mg


   Documented by: 


Aspirin (Aspirin 325 Mg Tab)  325 mg PO QDAY [facility]


   Last Admin: 02/26/21 11:25 Dose:  325 mg


   Documented by: 


Benzonatate (Benzonatate 100 Mg Cap)  100 mg PO Q8HR [facility]


   Last Admin: 02/26/21 13:15 Dose:  100 mg


   Documented by: 


Dextrose (Dextrose 50% In Water (25gm) 50 Ml Syringe)  50 ml IV Q30MIN PRN; 

Protocol


   PRN Reason: Hypoglycemia


Famotidine (Famotidine 20 Mg Tab)  20 mg PO BID [facility]


   Last Admin: 02/26/21 11:25 Dose:  20 mg


   Documented by: 


Fluticasone Propionate (Fluticasone Propionate Nasal Spray 16 Gm)  50 mcg NS 

QDAY [facility]


   Last Admin: 02/26/21 11:24 Dose:  50 mcg


   Documented by: 


Furosemide (Furosemide 40 Mg/4 Ml Inj)  40 mg IV 0600,1800 [facility]


   Last Admin: 02/26/21 05:39 Dose:  40 mg


   Documented by: 


Heparin Sodium (Porcine) (Heparin 5,000 Unit/1 Ml Vial)  5,000 unit SUB-Q Q12HR 

[facility]


   Last Admin: 02/26/21 11:26 Dose:  5,000 unit


   Documented by: 


Hydromorphone HCl (Hydromorphone 1 Mg/1 Ml Inj)  0.5 mg IV Q3H PRN


   PRN Reason: Pain , Severe (7-10)


Levofloxacin/Dextrose (Levaquin 750mg/150ml)  750 mg in 150 mls @ 100 mls/hr IV 

Q24HR [facility]; Protocol


   Last Admin: 02/26/21 11:23 Dose:  100 mls/hr


   Documented by: 


Ceftriaxone Sodium (Rocephin/Ns 1 Gm/50 Ml)  1 gm in 50 mls @ 100 mls/hr IV Q24H

 [facility]; Protocol


   Last Admin: 02/26/21 11:30 Dose:  100 mls/hr


   Documented by: 


Insulin Human Lispro (Insulin Lispro 100 Unit/Ml)  0 unit SUB-Q ACHS [facility]; 

Protocol


Lisinopril (Lisinopril 10 Mg Tab)  10 mg PO QDAY [facility]


   Last Admin: 02/26/21 11:25 Dose:  10 mg


   Documented by: 


Methylprednisolone Sodium Succinate (Methylprednisolone Sod Succinate 40 Mg/1 Ml

 Inj)  40 mg IV Q8HR [facility]


   Last Admin: 02/26/21 13:16 Dose:  40 mg


   Documented by: 


Nicotine (Nicotine 21 Mg/24 Hr Patch)  21 mg TD QDAY [facility]


   Last Admin: 02/26/21 11:24 Dose:  21 mg


   Documented by: 


Ondansetron HCl (Ondansetron 4 Mg/2 Ml Inj)  4 mg IV Q8H PRN


   PRN Reason: Nausea And Vomiting


Oxycodone/Acetaminophen (Oxycodone /Acetaminophen 5-325mg Tab)  1 tab PO Q6H PRN


   PRN Reason: Pain, Moderate (4-6)


Potassium Chloride (Potassium Chloride Er 20 Meq Tab)  20 meq PO Q12HR [facility]


   Last Admin: 02/26/21 11:25 Dose:  20 meq


   Documented by: 


Pravastatin Sodium (Pravastatin 20 Mg Tab)  10 mg PO QHS [facility]


Sodium Chloride (Sodium Chloride 0.9% 10 Ml Flush Syringe)  10 ml IV BID [facility]


   Last Admin: 02/26/21 11:31 Dose:  10 ml


   Documented by: 


Sodium Chloride (Sodium Chloride 0.9% 10 Ml Flush Syringe)  10 ml IV PRN PRN


   PRN Reason: LINE FLUSH


   Last Admin: 02/26/21 05:40 Dose:  10 ml


   Documented by: 











Physical Examination


Vital signs: 


                                   Vital Signs











Temp Pulse Resp BP Pulse Ox


 


 98.1 F   113 H  20   141/60   98 


 


 02/25/21 15:03  02/25/21 15:03  02/25/21 15:03  02/25/21 15:03  02/25/21 15:03














Results





- Laboratory Findings


CBC and BMP: 


                                 02/26/21 04:14





                                 02/26/21 04:14


PT/INR, D-dimer











PT  15.9 Sec. (12.2-14.9)  H  02/25/21  15:26    


 


INR  1.27  (0.87-1.13)  H  02/25/21  15:26    








Abnormal lab findings: 


                                  Abnormal Labs











  02/25/21 02/25/21 02/25/21





  15:22 15:26 15:26


 


RBC  3.09 L  


 


Hgb  10.5 L  


 


Hct  31.9 L  


 


MCV  103 H  


 


MCH  34 H  


 


Lymph % (Auto)  8.1 L  


 


Mono % (Auto)  14.1 H  


 


Lymph # (Auto)  0.5 L  


 


Mono # (Auto)  0.9 H  


 


Seg Neutrophils %  76.7 H  


 


PT   15.9 H 


 


INR   1.27 H 


 


Sodium    130 L


 


Chloride    88.8 L


 


BUN    6 L


 


Creatinine   


 


Glucose    334 H


 


Hemoglobin A1c   


 


Iron   


 


TIBC   


 


Transferrin   


 


AST    50 H


 


Alkaline Phosphatase    214 H


 


Albumin    3.2 L


 


Urine WBC (Auto)   














  02/26/21 02/26/21 02/26/21





  02:22 04:14 04:14


 


RBC   3.10 L 


 


Hgb   10.6 L 


 


Hct   32.5 L 


 


MCV   105 H 


 


MCH   34 H 


 


Lymph % (Auto)   4.9 L 


 


Mono % (Auto)   


 


Lymph # (Auto)   0.3 L 


 


Mono # (Auto)   


 


Seg Neutrophils %   88.0 H 


 


PT   


 


INR   


 


Sodium    132 L


 


Chloride    91.0 L


 


BUN    5 L


 


Creatinine    0.6 L


 


Glucose    236 H


 


Hemoglobin A1c   


 


Iron   


 


TIBC   


 


Transferrin   


 


AST    45 H


 


Alkaline Phosphatase    203 H


 


Albumin    3.3 L


 


Urine WBC (Auto)  70.0 H  














  02/26/21 02/26/21





  04:14 07:47


 


RBC  


 


Hgb  


 


Hct  


 


MCV  


 


MCH  


 


Lymph % (Auto)  


 


Mono % (Auto)  


 


Lymph # (Auto)  


 


Mono # (Auto)  


 


Seg Neutrophils %  


 


PT  


 


INR  


 


Sodium  


 


Chloride  


 


BUN  


 


Creatinine  


 


Glucose  


 


Hemoglobin A1c  9.9 H 


 


Iron   36 L


 


TIBC   191 L


 


Transferrin   165 L


 


AST  


 


Alkaline Phosphatase  


 


Albumin  


 


Urine WBC (Auto)

## 2021-02-27 LAB
BUN SERPL-MCNC: 14 MG/DL (ref 9–20)
BUN/CREAT SERPL: 16 %
CALCIUM SERPL-MCNC: 9.3 MG/DL (ref 8.4–10.2)
HEMOLYSIS INDEX: 0

## 2021-02-27 PROCEDURE — 5A09357 ASSISTANCE WITH RESPIRATORY VENTILATION, LESS THAN 24 CONSECUTIVE HOURS, CONTINUOUS POSITIVE AIRWAY PRESSURE: ICD-10-PCS | Performed by: INTERNAL MEDICINE

## 2021-02-27 RX ADMIN — LISINOPRIL SCH MG: 10 TABLET ORAL at 10:00

## 2021-02-27 RX ADMIN — ARFORMOTEROL TARTRATE SCH MCG: 15 SOLUTION RESPIRATORY (INHALATION) at 10:42

## 2021-02-27 RX ADMIN — HEPARIN SODIUM SCH UNIT: 5000 INJECTION, SOLUTION INTRAVENOUS; SUBCUTANEOUS at 22:50

## 2021-02-27 RX ADMIN — PRAVASTATIN SODIUM SCH MG: 20 TABLET ORAL at 22:53

## 2021-02-27 RX ADMIN — NICOTINE SCH MG: 21 PATCH TRANSDERMAL at 10:00

## 2021-02-27 RX ADMIN — NEPHROCAP SCH CAP: 1 CAP ORAL at 10:59

## 2021-02-27 RX ADMIN — INSULIN LISPRO SCH UNIT: 100 INJECTION, SOLUTION INTRAVENOUS; SUBCUTANEOUS at 22:49

## 2021-02-27 RX ADMIN — Medication SCH MG: at 10:00

## 2021-02-27 RX ADMIN — METHYLPREDNISOLONE SODIUM SUCCINATE SCH MG: 40 INJECTION, POWDER, FOR SOLUTION INTRAMUSCULAR; INTRAVENOUS at 22:50

## 2021-02-27 RX ADMIN — INSULIN LISPRO SCH UNIT: 100 INJECTION, SOLUTION INTRAVENOUS; SUBCUTANEOUS at 16:30

## 2021-02-27 RX ADMIN — INSULIN LISPRO SCH UNIT: 100 INJECTION, SOLUTION INTRAVENOUS; SUBCUTANEOUS at 07:30

## 2021-02-27 RX ADMIN — Medication SCH ML: at 22:53

## 2021-02-27 RX ADMIN — FAMOTIDINE SCH MG: 20 TABLET ORAL at 22:53

## 2021-02-27 RX ADMIN — MULTIVITAMIN TABLET SCH EACH: TABLET at 11:00

## 2021-02-27 RX ADMIN — POTASSIUM CHLORIDE SCH MEQ: 1500 TABLET, EXTENDED RELEASE ORAL at 22:53

## 2021-02-27 RX ADMIN — BENZONATATE SCH MG: 100 CAPSULE ORAL at 05:30

## 2021-02-27 RX ADMIN — POTASSIUM CHLORIDE SCH MEQ: 1500 TABLET, EXTENDED RELEASE ORAL at 10:00

## 2021-02-27 RX ADMIN — Medication SCH ML: at 10:00

## 2021-02-27 RX ADMIN — IPRATROPIUM BROMIDE AND ALBUTEROL SULFATE SCH AMPUL: .5; 3 SOLUTION RESPIRATORY (INHALATION) at 20:54

## 2021-02-27 RX ADMIN — HEPARIN SODIUM SCH UNIT: 5000 INJECTION, SOLUTION INTRAVENOUS; SUBCUTANEOUS at 10:00

## 2021-02-27 RX ADMIN — FLUTICASONE PROPIONATE SCH MCG: 50 SPRAY, METERED NASAL at 10:00

## 2021-02-27 RX ADMIN — FAMOTIDINE SCH MG: 20 TABLET ORAL at 11:00

## 2021-02-27 RX ADMIN — FUROSEMIDE SCH MG: 10 INJECTION, SOLUTION INTRAVENOUS at 17:15

## 2021-02-27 RX ADMIN — IPRATROPIUM BROMIDE AND ALBUTEROL SULFATE SCH AMPUL: .5; 3 SOLUTION RESPIRATORY (INHALATION) at 10:37

## 2021-02-27 RX ADMIN — INSULIN LISPRO SCH UNIT: 100 INJECTION, SOLUTION INTRAVENOUS; SUBCUTANEOUS at 11:30

## 2021-02-27 RX ADMIN — BENZONATATE SCH MG: 100 CAPSULE ORAL at 13:03

## 2021-02-27 RX ADMIN — METHYLPREDNISOLONE SODIUM SUCCINATE SCH MG: 40 INJECTION, POWDER, FOR SOLUTION INTRAMUSCULAR; INTRAVENOUS at 10:00

## 2021-02-27 RX ADMIN — ARFORMOTEROL TARTRATE SCH MCG: 15 SOLUTION RESPIRATORY (INHALATION) at 20:53

## 2021-02-27 RX ADMIN — BUDESONIDE SCH MG: 0.5 INHALANT RESPIRATORY (INHALATION) at 10:37

## 2021-02-27 RX ADMIN — BENZONATATE SCH MG: 100 CAPSULE ORAL at 22:52

## 2021-02-27 RX ADMIN — ASPIRIN SCH MG: 325 TABLET ORAL at 11:00

## 2021-02-27 RX ADMIN — FUROSEMIDE SCH MG: 10 INJECTION, SOLUTION INTRAVENOUS at 05:30

## 2021-02-27 RX ADMIN — BUDESONIDE SCH MG: 0.5 INHALANT RESPIRATORY (INHALATION) at 20:54

## 2021-02-27 NOTE — GASTROENTEROLOGY PROGRESS NOTE
Assessment and Plan


GI: pt multiple medical problems, noted alcohol abuse now with abdominal 

swelling with ct scan raising possible cirrhosis and moderate ascites


- labs show slight increase coags, nl platelets, slight decrease albumin, not 

strongly but may be consistent with cirrhosis


- would order LVP if stable and ok due to body habitus


- liver related serologies


- [facility] protocol


- from GI/liver standpoint most of evaluation and management can be done as 

outpt


- will follow








Subjective


Date of service: 02/27/21


Principal diagnosis: Abdominal pain, alcohol abuse, abdominal distention.


Interval history: 


- reports still with some shortness of breath, denies specific GI complaints 

except improved loose stools








Objective





- Constitutional


Vitals: 


                                        











Temp Pulse Resp BP Pulse Ox


 


 97.3 F L  100 H  18   131/70   96 


 


 02/27/21 08:13  02/27/21 10:40  02/27/21 10:40  02/27/21 08:13  02/27/21 10:25











General appearance: no acute distress





- EENT


Eyes: PERRL





- Respiratory


Respiratory: bilateral: rhonchi





- Cardiovascular


Rhythm: regular


Heart Sounds: Present: S1 & S2





- Gastrointestinal


General gastrointestinal: Present: soft, non-tender, non-distended





- Labs


CBC & Chem 7: 


                                 02/26/21 04:14





                                 02/27/21 04:26


Labs: 


                         Laboratory Results - last 24 hr











  02/26/21 02/26/21 02/26/21





  13:32 17:34 17:34


 


Sodium   


 


Potassium   


 


Chloride   


 


Carbon Dioxide   


 


Anion Gap   


 


BUN   


 


Creatinine   


 


Estimated GFR   


 


BUN/Creatinine Ratio   


 


Glucose   


 


POC Glucose  305 H  


 


Calcium   


 


Phosphorus   4.50 


 


Magnesium   2.00 


 


Total Bilirubin   0.80 


 


Direct Bilirubin   0.5 H 


 


Indirect Bilirubin   0.3 


 


AST   55 H 


 


ALT   27 


 


Alkaline Phosphatase   201 H 


 


Ammonia    39.0


 


Total Protein   6.4 


 


Albumin   3.6 L 


 


Albumin/Globulin Ratio   1.3 


 


Amylase   10 L 


 


Lipase   20 














  02/26/21 02/27/21 02/27/21





  22:06 04:26 08:12


 


Sodium   134 L 


 


Potassium   5.4 H D 


 


Chloride   91.6 L 


 


Carbon Dioxide   18 L 


 


Anion Gap   30 


 


BUN   14 


 


Creatinine   0.9 


 


Estimated GFR   > 60 


 


BUN/Creatinine Ratio   16 


 


Glucose   360 H 


 


POC Glucose  362 H   385 H


 


Calcium   9.3 


 


Phosphorus   


 


Magnesium   


 


Total Bilirubin   


 


Direct Bilirubin   


 


Indirect Bilirubin   


 


AST   


 


ALT   


 


Alkaline Phosphatase   


 


Ammonia   


 


Total Protein   


 


Albumin   


 


Albumin/Globulin Ratio   


 


Amylase   


 


Lipase

## 2021-02-27 NOTE — PROGRESS NOTE
Assessment and Plan


Assessment and Plan


Assessment and plan: 


Urinary tract infection


-2/26 urinalysis shows small leukocyte esterase and no nitrates


-2/26 urine culture pending


-Currently being treated with Levaquin was given initial dose of Rocephin.  No 

dysuria no fever.


-Supportive care





COPD


-Pulmonary consult, appreciate recommendations


-Steroid therapy


-Levaquin IV we will also treat underlying UTI as well.


-Pulmicort scheduled, Proventil as needed


-Supportive care


-Supplemental oxygen as needed


-Pulmonary hygiene


-Solu-Medrol has improved with Solu-Medrol.





Abdominal distention


-2/25 bilateral lower extremity Doppler ultrasound shows no sonographic evidence

of DVT in either lower extremity


-2/26 CT abd/pelvis shows diffuse hepatic steatosis with splenomegaly.  And 

ascites secondary to cirrhosis.


-2/26 Stool studies pending


-2/26 C. difficile pending


-GI following appreciate recommendations.





EtOH abuse


-Patient admits to drinking 9 beers per day and can go without alcohol intake 

for 4 days


-Seizure/fall/aspiration precautions


-[facility] protocol


-Supportive care


-Folate, Thiamine, multivitamin supplementation





Diastolic congestive heart failure


-2/25 proBNP 137


-2/26 echocardiogram shows normal global left ventricle systolic function, no 

left ventricular hypertrophy, estimated fraction 55 to 60%, trace MR, trace TR, 

moderate pleural effusion, no pericardial effusion, RVSP calculated 20 mmHg, 

right ventricular global systolic function is normal


Iron deficiency anemia


-Lasix stable with current diuresis.


-Daily weights


-Strict intake and output





Anemia


-Presented with H/H of 10.5/31.9 with elevated MVC and MCH 


-2/26 iron studies: Iron 36, TIBC 91, percent saturation 18.85, transferrin 165


-Multivitamin





Hyponatremia


-Presented with a sodium of 130, corrected sodium 134


-Trend BMP


-Avoid rapid correction


-Monitor neuro status


-Aviod MIVF in setting of ABD distention bilateral lower extremity swelling





Hypochloremia


-Presented with a chloride of 88


-Trend BMP


-Aviod MIVF in setting of ABD distention bilateral lower extremity swelling





Morbid obesity


-Encourage dietary and lifestyle modifications


-Consider bariatric surgery outpatient





Hypertension


-Restart home hypertensive regimen and titrate as needed


-Blood pressure monitoring per protocol 


-Hydralazine 10 mg IVP for SBP greater than 160





LORE


-Continue home CPAP therapy


-Supportive care





Diabetes mellitus


-2/26 hemoglobin A1c 9.9


-Presented with hyperglycemia 334


-SSI


-Accu-Cheks AC at bedtime


-CC cardiac diet


-Initiate long-term insulin as needed


We will start Levemir 15 units nightly.





CAD


-Continue home statin therapy


-Supportive care





Hypoalbuminemia


-Presented with albumin of 3.2


-Nutrition consulted, appreciate recommendations





Tobacco abuse


-Former smoker


-Resume home NicoDerm patches





DVT prophylaxis


-GI prophylaxis


-Heparin subcu








Subjective


Date of service: 02/27/21


Principal diagnosis: Abdominal pain, alcohol abuse, abdominal distention.


Interval history: 


Interval history: 


This is a 46-year-old male with hypertension, COPD, LORE on CPAP, CAD pulmonary 

hypertension, alcohol abuse and former tobacco abuse presented to the emergency 

department on 2/25, planing of swelling and pain to bilateral lower extremities 

and abdomen associated with shortness of breath and some intermittent chest 

discomfort which has been ongoing for 2 months and progressively worsening 

during visit to emergency department via EMS.  Work-up in the emergency de

partment revealed hyponatremia, hypochloremia, hyperglycemia and elevated liver 

enzymes.  Patient was admitted to the hospitalist service with consults to 

pulmonology and gastroenterology.





2/26: Patient complains of a 2-month history of diarrhea EtOH abuse.  GI was 

consulted, CIWA protocol initiated, pulmonology was consulted.  Patient was 

started on steroids, multivitamins, and his medications were consulted.  Stool 

studies were ordered and a CT abdomen/pelvis is pending.





2/27; patient states he feels better.  I just with the swelling will go down.  

Treated with BiPAP overnight.  Morbidly obese.  Clinically from evaluating 

patient or nurse at bedside as well as previous progress notes edema has 

improved.





Objective





- Constitutional


Vitals: 


                               Vital Signs - 12hr











  02/26/21 02/26/21 02/27/21





  22:10 23:14 05:10


 


Temperature 97.9 F  98.0 F


 


Pulse Rate 108 H 102 H 100 H


 


Respiratory 20 18 18





Rate   


 


Blood Pressure 129/57  117/61


 


O2 Sat by Pulse 92 99 98





Oximetry   














  02/27/21





  08:13


 


Temperature 97.3 F L


 


Pulse Rate 99 H


 


Respiratory 18





Rate 


 


Blood Pressure 131/70


 


O2 Sat by Pulse 98





Oximetry 











General appearance: Present: no acute distress, well-nourished





- EENT


Eyes: PERRL, EOM intact


ENT: hearing intact, clear oral mucosa


Ears: bilateral: normal





- Neck


Neck: supple, normal ROM





- Respiratory


Respiratory effort: normal


Respiratory: right: diminished (No wheezing), bilateral: CTA





- Breasts


Breasts: normal





- Cardiovascular


Rhythm: regular


Heart Sounds: Present: S1 & S2.  Absent: gallop, rub


Extremities: pulses intact, No edema, normal color, Full ROM


Extremity abnormal: edema (+2-3 pitting edema), other (Edema)





- Gastrointestinal


General gastrointestinal: Present: soft, non-tender, non-distended, normal bowel

sounds, other (Obese ascites.)





- Genitourinary


Male genitourinary: normal





- Integumentary


Integumentary: clear, warm, dry





- Musculoskeletal


Musculoskeletal: 1, strength equal bilaterally





- Neurologic


Neurologic: moves all extremities





- Psychiatric


Psychiatric: memory intact, appropriate mood/affect, intact judgment & insight





- Labs


CBC & Chem 7: 


                                 02/26/21 04:14





                                 02/27/21 04:26


Labs: 


                              Abnormal lab results











  02/26/21 02/26/21 02/26/21 Range/Units





  07:47 13:32 17:34 


 


Sodium     (137-145)  mmol/L


 


Potassium     (3.6-5.0)  mmol/L


 


Chloride     ()  mmol/L


 


Carbon Dioxide     (22-30)  mmol/L


 


Glucose     ()  mg/dL


 


POC Glucose   305 H   ()  mg/dL


 


Iron  36 L    ()  ug/dL


 


TIBC  191 L    (250-450)  mcg/dL


 


Transferrin  165 L    (180-329)  mg/dl


 


Direct Bilirubin    0.5 H  (0-0.2)  mg/dL


 


AST    55 H  (5-40)  units/L


 


Alkaline Phosphatase    201 H  ()  units/L


 


Albumin    3.6 L  (3.9-5)  g/dL


 


Amylase    10 L  ()  units/L














  02/26/21 02/27/21 02/27/21 Range/Units





  22:06 04:26 08:12 


 


Sodium   134 L   (137-145)  mmol/L


 


Potassium   5.4 H D   (3.6-5.0)  mmol/L


 


Chloride   91.6 L   ()  mmol/L


 


Carbon Dioxide   18 L   (22-30)  mmol/L


 


Glucose   360 H   ()  mg/dL


 


POC Glucose  362 H   385 H  ()  mg/dL


 


Iron     ()  ug/dL


 


TIBC     (250-450)  mcg/dL


 


Transferrin     (180-329)  mg/dl


 


Direct Bilirubin     (0-0.2)  mg/dL


 


AST     (5-40)  units/L


 


Alkaline Phosphatase     ()  units/L


 


Albumin     (3.9-5)  g/dL


 


Amylase     ()  units/L














HEART Score





- HEART Score


Age: 45-65


Risk factors: 1-2 risk factors


Troponin: 


                                        











Troponin T  < 0.010 ng/mL (0.00-0.029)   02/25/21  16:29    











Troponin: < normal limit





- Critical Actions


Critical Actions: 0-3 pts:0.9-1.7%risk of adverse cardiac event.Candidate for 

discharge Likely ischemic colitis;  Waters[place] distribution of the inflammation.  Still no progress. Not tolerating the full liquid diet. Nausea may be medication related (Flagyl).  At least the bleeding has stopped spontaneously.    Will attempt to mobilize.  Observe.

## 2021-02-27 NOTE — PROGRESS NOTE
Assessment and Plan








Abdominal pain, unspecified.


Acute exacerbation of chronic obstructive pulmonary disease.


Possible heart failure.


Morbid obesity.


Obstructive sleep apnea.


Pulmonary hypertension.


Anemia that is microcytic





- continue to wean supplemental oxygen to keep O2 sats > 90%


- continue bronchodilators (JANICE & LABA) with pulm hygiene per RT


- continue systemic steroids with slow taper


- continue inhaled corticosteroids


- continue NIV scheduled qhs re: LORE


- avoid nephrotoxins, renally dose all medications


- continue mobility protocols to prevent pressure ulcers


- PT/OT as tolerated


- Wound care per RN/WCT


- accuchecks with glycemic control per SSI for target blood glucose < 180 mg/dL 


- Smoking abstinence strongly counseled at the bedside  


- home oxygen evaluation at discharge


- GI & VTE prophylaxis


- Flu & pneumovax per protocol


- Pulmonary out patient follow up for PFTs and optimization of respiratory 

status


- continue other care per attending / other consultants


- prn analgesia per pain score





... re-evaluate in am & prn














Subjective


Date of service: 02/27/21


Principal diagnosis: Abdominal pain, alcohol abuse, abdominal distention.


Interval history: 





Patient is seen today for: Abdominal pain, unspecified; AE-COPD; Possible heart 

failure; Morbid obesity; LORE; Pulmonary HTN (Mild)





Seen and examined at bedside; 24hour events reviewed; nursing and respiratory 

care staff consulted; no adverse overnight events reported to me; resting 

peacefully in bed; looks and feels better; abdominal pain mostly gone; 

tolerating NIV qhs; no N/V/F/C today





Objective


                               Vital Signs - 12hr











  02/27/21 02/27/21





  05:10 08:13


 


Temperature 98.0 F 97.3 F L


 


Pulse Rate 100 H 99 H


 


Respiratory 18 18





Rate  


 


Blood Pressure 117/61 131/70


 


O2 Sat by Pulse 98 98





Oximetry  











Constitutional: no acute distress, alert, other (middle aged obese male with 

mildly increased respiratopry effort at rest)


Eyes: non-icteric


ENT: oropharynx moist


Neck: supple, no lymphadenopathy, no JVD


Effort: mildly labored


Ascultation: Bilateral: diminished breath sounds, rhonchi (scant)


Percussion: Bilateral: not dull


Cardiovascular: regular rate and rhythm


Gastrointestinal: normoactive bowel sounds, soft, non-tender, non-distended 

(protuberant)


Integumentary: normal


Extremities: no cyanosis, pink and warm, pulses normal, edema (trace)


Neurologic: normal mental status, non-focal exam, pupils equal and round, motor 

strength normal and


Psychiatric: mood appropriate, affect normal


CBC and BMP: 


                                 02/26/21 04:14





                                 03/01/21 07:46


ABG, PT/INR, D-dimer: 


PT/INR, D-dimer











PT  15.9 Sec. (12.2-14.9)  H  02/25/21  15:26    


 


INR  1.27  (0.87-1.13)  H  02/25/21  15:26    








Abnormal lab findings: 


                                  Abnormal Labs











  02/25/21 02/25/21 02/25/21





  15:22 15:26 15:26


 


RBC  3.09 L  


 


Hgb  10.5 L  


 


Hct  31.9 L  


 


MCV  103 H  


 


MCH  34 H  


 


Lymph % (Auto)  8.1 L  


 


Mono % (Auto)  14.1 H  


 


Lymph # (Auto)  0.5 L  


 


Mono # (Auto)  0.9 H  


 


Seg Neutrophils %  76.7 H  


 


PT   15.9 H 


 


INR   1.27 H 


 


Sodium    130 L


 


Potassium   


 


Chloride    88.8 L


 


Carbon Dioxide   


 


BUN    6 L


 


Creatinine   


 


Glucose    334 H


 


POC Glucose   


 


Hemoglobin A1c   


 


Iron   


 


TIBC   


 


Transferrin   


 


Direct Bilirubin   


 


AST    50 H


 


Alkaline Phosphatase    214 H


 


Albumin    3.2 L


 


Amylase   


 


Urine WBC (Auto)   














  02/26/21 02/26/21 02/26/21





  02:22 04:14 04:14


 


RBC   3.10 L 


 


Hgb   10.6 L 


 


Hct   32.5 L 


 


MCV   105 H 


 


MCH   34 H 


 


Lymph % (Auto)   4.9 L 


 


Mono % (Auto)   


 


Lymph # (Auto)   0.3 L 


 


Mono # (Auto)   


 


Seg Neutrophils %   88.0 H 


 


PT   


 


INR   


 


Sodium    132 L


 


Potassium   


 


Chloride    91.0 L


 


Carbon Dioxide   


 


BUN    5 L


 


Creatinine    0.6 L


 


Glucose    236 H


 


POC Glucose   


 


Hemoglobin A1c   


 


Iron   


 


TIBC   


 


Transferrin   


 


Direct Bilirubin   


 


AST    45 H


 


Alkaline Phosphatase    203 H


 


Albumin    3.3 L


 


Amylase   


 


Urine WBC (Auto)  70.0 H  














  02/26/21 02/26/21 02/26/21





  04:14 07:47 13:32


 


RBC   


 


Hgb   


 


Hct   


 


MCV   


 


MCH   


 


Lymph % (Auto)   


 


Mono % (Auto)   


 


Lymph # (Auto)   


 


Mono # (Auto)   


 


Seg Neutrophils %   


 


PT   


 


INR   


 


Sodium   


 


Potassium   


 


Chloride   


 


Carbon Dioxide   


 


BUN   


 


Creatinine   


 


Glucose   


 


POC Glucose    305 H


 


Hemoglobin A1c  9.9 H  


 


Iron   36 L 


 


TIBC   191 L 


 


Transferrin   165 L 


 


Direct Bilirubin   


 


AST   


 


Alkaline Phosphatase   


 


Albumin   


 


Amylase   


 


Urine WBC (Auto)   














  02/26/21 02/26/21 02/27/21





  17:34 22:06 04:26


 


RBC   


 


Hgb   


 


Hct   


 


MCV   


 


MCH   


 


Lymph % (Auto)   


 


Mono % (Auto)   


 


Lymph # (Auto)   


 


Mono # (Auto)   


 


Seg Neutrophils %   


 


PT   


 


INR   


 


Sodium    134 L


 


Potassium    5.4 H D


 


Chloride    91.6 L


 


Carbon Dioxide    18 L


 


BUN   


 


Creatinine   


 


Glucose    360 H


 


POC Glucose   362 H 


 


Hemoglobin A1c   


 


Iron   


 


TIBC   


 


Transferrin   


 


Direct Bilirubin  0.5 H  


 


AST  55 H  


 


Alkaline Phosphatase  201 H  


 


Albumin  3.6 L  


 


Amylase  10 L  


 


Urine WBC (Auto)   














  02/27/21





  08:12


 


RBC 


 


Hgb 


 


Hct 


 


MCV 


 


MCH 


 


Lymph % (Auto) 


 


Mono % (Auto) 


 


Lymph # (Auto) 


 


Mono # (Auto) 


 


Seg Neutrophils % 


 


PT 


 


INR 


 


Sodium 


 


Potassium 


 


Chloride 


 


Carbon Dioxide 


 


BUN 


 


Creatinine 


 


Glucose 


 


POC Glucose  385 H


 


Hemoglobin A1c 


 


Iron 


 


TIBC 


 


Transferrin 


 


Direct Bilirubin 


 


AST 


 


Alkaline Phosphatase 


 


Albumin 


 


Amylase 


 


Urine WBC (Auto) 











Additional Studies: 





CT abd/pelvis = ascites; cirrhosis

## 2021-02-28 PROCEDURE — 5A09357 ASSISTANCE WITH RESPIRATORY VENTILATION, LESS THAN 24 CONSECUTIVE HOURS, CONTINUOUS POSITIVE AIRWAY PRESSURE: ICD-10-PCS | Performed by: INTERNAL MEDICINE

## 2021-02-28 RX ADMIN — INSULIN HUMAN SCH UNIT: 100 INJECTION, SUSPENSION SUBCUTANEOUS at 18:32

## 2021-02-28 RX ADMIN — ASPIRIN SCH MG: 325 TABLET ORAL at 10:29

## 2021-02-28 RX ADMIN — POTASSIUM CHLORIDE SCH MEQ: 1500 TABLET, EXTENDED RELEASE ORAL at 10:30

## 2021-02-28 RX ADMIN — NEPHROCAP SCH CAP: 1 CAP ORAL at 10:29

## 2021-02-28 RX ADMIN — BENZONATATE SCH MG: 100 CAPSULE ORAL at 06:47

## 2021-02-28 RX ADMIN — FUROSEMIDE SCH MG: 10 INJECTION, SOLUTION INTRAVENOUS at 18:28

## 2021-02-28 RX ADMIN — IPRATROPIUM BROMIDE AND ALBUTEROL SULFATE SCH AMPUL: .5; 3 SOLUTION RESPIRATORY (INHALATION) at 20:57

## 2021-02-28 RX ADMIN — FAMOTIDINE SCH MG: 20 TABLET ORAL at 10:28

## 2021-02-28 RX ADMIN — LISINOPRIL SCH MG: 10 TABLET ORAL at 10:29

## 2021-02-28 RX ADMIN — BUDESONIDE SCH MG: 0.5 INHALANT RESPIRATORY (INHALATION) at 08:10

## 2021-02-28 RX ADMIN — NICOTINE SCH MG: 21 PATCH TRANSDERMAL at 10:29

## 2021-02-28 RX ADMIN — ARFORMOTEROL TARTRATE SCH MCG: 15 SOLUTION RESPIRATORY (INHALATION) at 20:57

## 2021-02-28 RX ADMIN — METHYLPREDNISOLONE SODIUM SUCCINATE SCH MG: 40 INJECTION, POWDER, FOR SOLUTION INTRAMUSCULAR; INTRAVENOUS at 10:28

## 2021-02-28 RX ADMIN — INSULIN LISPRO SCH UNIT: 100 INJECTION, SOLUTION INTRAVENOUS; SUBCUTANEOUS at 12:00

## 2021-02-28 RX ADMIN — METHYLPREDNISOLONE SODIUM SUCCINATE SCH MG: 40 INJECTION, POWDER, FOR SOLUTION INTRAMUSCULAR; INTRAVENOUS at 22:08

## 2021-02-28 RX ADMIN — INSULIN LISPRO SCH UNIT: 100 INJECTION, SOLUTION INTRAVENOUS; SUBCUTANEOUS at 08:45

## 2021-02-28 RX ADMIN — FUROSEMIDE SCH MG: 10 INJECTION, SOLUTION INTRAVENOUS at 06:47

## 2021-02-28 RX ADMIN — HEPARIN SODIUM SCH UNIT: 5000 INJECTION, SOLUTION INTRAVENOUS; SUBCUTANEOUS at 22:09

## 2021-02-28 RX ADMIN — FAMOTIDINE SCH MG: 20 TABLET ORAL at 22:08

## 2021-02-28 RX ADMIN — INSULIN LISPRO SCH UNIT: 100 INJECTION, SOLUTION INTRAVENOUS; SUBCUTANEOUS at 22:08

## 2021-02-28 RX ADMIN — HEPARIN SODIUM SCH UNIT: 5000 INJECTION, SOLUTION INTRAVENOUS; SUBCUTANEOUS at 10:37

## 2021-02-28 RX ADMIN — POTASSIUM CHLORIDE SCH MEQ: 1500 TABLET, EXTENDED RELEASE ORAL at 22:08

## 2021-02-28 RX ADMIN — IPRATROPIUM BROMIDE AND ALBUTEROL SULFATE SCH: .5; 3 SOLUTION RESPIRATORY (INHALATION) at 13:44

## 2021-02-28 RX ADMIN — BENZONATATE SCH MG: 100 CAPSULE ORAL at 22:07

## 2021-02-28 RX ADMIN — Medication SCH MG: at 10:30

## 2021-02-28 RX ADMIN — ARFORMOTEROL TARTRATE SCH MCG: 15 SOLUTION RESPIRATORY (INHALATION) at 08:10

## 2021-02-28 RX ADMIN — Medication SCH ML: at 10:30

## 2021-02-28 RX ADMIN — FLUTICASONE PROPIONATE SCH MCG: 50 SPRAY, METERED NASAL at 18:28

## 2021-02-28 RX ADMIN — PRAVASTATIN SODIUM SCH MG: 20 TABLET ORAL at 22:07

## 2021-02-28 RX ADMIN — Medication SCH ML: at 22:07

## 2021-02-28 RX ADMIN — MULTIVITAMIN TABLET SCH EACH: TABLET at 10:30

## 2021-02-28 RX ADMIN — IPRATROPIUM BROMIDE AND ALBUTEROL SULFATE SCH AMPUL: .5; 3 SOLUTION RESPIRATORY (INHALATION) at 08:11

## 2021-02-28 RX ADMIN — INSULIN LISPRO SCH UNIT: 100 INJECTION, SOLUTION INTRAVENOUS; SUBCUTANEOUS at 18:31

## 2021-02-28 RX ADMIN — BENZONATATE SCH MG: 100 CAPSULE ORAL at 18:29

## 2021-02-28 NOTE — GASTROENTEROLOGY PROGRESS NOTE
Assessment and Plan


GI: pt multiple medical problems, noted alcohol abuse now with abdominal 

swelling with ct scan raising possible cirrhosis and moderate ascites


- labs show slight increase coags, nl platelets, slight decrease albumin, not 

strongly but may be consistent with cirrhosis


- ordered LVP if stable and ok due to body habitus


- liver related serologies


- [facility] protocol


- from GI/liver standpoint most of evaluation and management can be done as out

pt


- will follow








Subjective


Date of service: 02/28/21


Principal diagnosis: Abdominal pain, alcohol abuse, abdominal distention.


Interval history: 


- reports doing better. Still some abdominal discomfort








Objective





- Constitutional


Vitals: 


                                        











Temp Pulse Resp BP Pulse Ox


 


 97.6 F   112 H  18   145/80   99 


 


 02/28/21 16:39  02/28/21 16:39  02/28/21 16:39  02/28/21 16:39  02/28/21 16:39











General appearance: no acute distress





- EENT


Eyes: PERRL





- Respiratory


Respiratory: bilateral: CTA





- Cardiovascular


Rhythm: regular


Heart Sounds: Present: S1 & S2





- Gastrointestinal


General gastrointestinal: Present: soft, non-tender





- Labs


CBC & Chem 7: 


                                 02/26/21 04:14





                                 02/27/21 04:26


Labs: 


                         Laboratory Results - last 24 hr











  02/27/21 02/28/21





  21:19 07:34


 


POC Glucose  351 H  356 H
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
## 2021-03-01 LAB
BUN SERPL-MCNC: 18 MG/DL (ref 9–20)
BUN/CREAT SERPL: 18 %
CALCIUM SERPL-MCNC: 9.3 MG/DL (ref 8.4–10.2)
HEMOLYSIS INDEX: 5
MONOCYTES # FLD: 13 %
TOTAL CELLS COUNTED: 100 /MM3

## 2021-03-01 PROCEDURE — 0W9G30Z DRAINAGE OF PERITONEAL CAVITY WITH DRAINAGE DEVICE, PERCUTANEOUS APPROACH: ICD-10-PCS

## 2021-03-01 RX ADMIN — Medication SCH ML: at 23:15

## 2021-03-01 RX ADMIN — INSULIN HUMAN SCH UNIT: 100 INJECTION, SUSPENSION SUBCUTANEOUS at 18:26

## 2021-03-01 RX ADMIN — BUDESONIDE SCH: 0.5 INHALANT RESPIRATORY (INHALATION) at 09:06

## 2021-03-01 RX ADMIN — FUROSEMIDE SCH MG: 10 INJECTION, SOLUTION INTRAVENOUS at 18:26

## 2021-03-01 RX ADMIN — FAMOTIDINE SCH MG: 20 TABLET ORAL at 23:15

## 2021-03-01 RX ADMIN — METHYLPREDNISOLONE SODIUM SUCCINATE SCH MG: 40 INJECTION, POWDER, FOR SOLUTION INTRAMUSCULAR; INTRAVENOUS at 23:15

## 2021-03-01 RX ADMIN — FLUTICASONE PROPIONATE SCH MCG: 50 SPRAY, METERED NASAL at 10:39

## 2021-03-01 RX ADMIN — HEPARIN SODIUM SCH UNIT: 5000 INJECTION, SOLUTION INTRAVENOUS; SUBCUTANEOUS at 23:15

## 2021-03-01 RX ADMIN — ARFORMOTEROL TARTRATE SCH MCG: 15 SOLUTION RESPIRATORY (INHALATION) at 20:12

## 2021-03-01 RX ADMIN — IPRATROPIUM BROMIDE AND ALBUTEROL SULFATE SCH: .5; 3 SOLUTION RESPIRATORY (INHALATION) at 09:06

## 2021-03-01 RX ADMIN — IPRATROPIUM BROMIDE AND ALBUTEROL SULFATE SCH AMPUL: .5; 3 SOLUTION RESPIRATORY (INHALATION) at 20:12

## 2021-03-01 RX ADMIN — METHYLPREDNISOLONE SODIUM SUCCINATE SCH MG: 40 INJECTION, POWDER, FOR SOLUTION INTRAMUSCULAR; INTRAVENOUS at 10:48

## 2021-03-01 RX ADMIN — FAMOTIDINE SCH MG: 20 TABLET ORAL at 10:58

## 2021-03-01 RX ADMIN — BENZONATATE SCH MG: 100 CAPSULE ORAL at 23:15

## 2021-03-01 RX ADMIN — INSULIN LISPRO SCH UNIT: 100 INJECTION, SOLUTION INTRAVENOUS; SUBCUTANEOUS at 23:25

## 2021-03-01 RX ADMIN — NICOTINE SCH: 21 PATCH TRANSDERMAL at 11:00

## 2021-03-01 RX ADMIN — IPRATROPIUM BROMIDE AND ALBUTEROL SULFATE SCH: .5; 3 SOLUTION RESPIRATORY (INHALATION) at 11:20

## 2021-03-01 RX ADMIN — BENZONATATE SCH MG: 100 CAPSULE ORAL at 18:26

## 2021-03-01 RX ADMIN — HEPARIN SODIUM SCH: 5000 INJECTION, SOLUTION INTRAVENOUS; SUBCUTANEOUS at 10:59

## 2021-03-01 RX ADMIN — ASPIRIN SCH MG: 325 TABLET ORAL at 10:58

## 2021-03-01 RX ADMIN — Medication SCH MG: at 10:58

## 2021-03-01 RX ADMIN — BENZONATATE SCH MG: 100 CAPSULE ORAL at 06:21

## 2021-03-01 RX ADMIN — MULTIVITAMIN TABLET SCH EACH: TABLET at 11:27

## 2021-03-01 RX ADMIN — BUDESONIDE SCH MG: 0.5 INHALANT RESPIRATORY (INHALATION) at 09:05

## 2021-03-01 RX ADMIN — ARFORMOTEROL TARTRATE SCH MCG: 15 SOLUTION RESPIRATORY (INHALATION) at 09:05

## 2021-03-01 RX ADMIN — LISINOPRIL SCH MG: 10 TABLET ORAL at 11:28

## 2021-03-01 RX ADMIN — NEPHROCAP SCH CAP: 1 CAP ORAL at 10:58

## 2021-03-01 RX ADMIN — INSULIN LISPRO SCH UNIT: 100 INJECTION, SOLUTION INTRAVENOUS; SUBCUTANEOUS at 18:25

## 2021-03-01 RX ADMIN — BUDESONIDE SCH MG: 0.5 INHALANT RESPIRATORY (INHALATION) at 20:11

## 2021-03-01 RX ADMIN — INSULIN HUMAN SCH UNIT: 100 INJECTION, SUSPENSION SUBCUTANEOUS at 10:40

## 2021-03-01 RX ADMIN — INSULIN LISPRO SCH UNIT: 100 INJECTION, SOLUTION INTRAVENOUS; SUBCUTANEOUS at 10:41

## 2021-03-01 RX ADMIN — PRAVASTATIN SODIUM SCH MG: 20 TABLET ORAL at 23:15

## 2021-03-01 RX ADMIN — Medication SCH ML: at 11:00

## 2021-03-01 RX ADMIN — INSULIN LISPRO SCH UNIT: 100 INJECTION, SOLUTION INTRAVENOUS; SUBCUTANEOUS at 12:00

## 2021-03-01 RX ADMIN — FUROSEMIDE SCH MG: 10 INJECTION, SOLUTION INTRAVENOUS at 06:21

## 2021-03-01 NOTE — GASTROENTEROLOGY PROGRESS NOTE
Assessment and Plan


GI: pt multiple medical problems, noted alcohol abuse now with abdominal 

swelling with ct scan raising possible cirrhosis and moderate ascites


- labs show slight increase coags, nl platelets, slight decrease albumin, not 

strongly but may be consistent with cirrhosis


-for LVP and related labs today


- liver related serologies benign


- diet as tolerated


- from GI/liver standpoint most of evaluation and management can be done as 

outpt especially if get lvp today


- will follow








Subjective


Date of service: 03/01/21


Principal diagnosis: Abdominal pain, alcohol abuse, abdominal distention.


Interval history: 


- reports overall feeling better. Abdominal pain improving








Objective





- Constitutional


Vitals: 


                                        











Temp Pulse Resp BP Pulse Ox


 


 97.2 F L  107 H  20   121/64   98 


 


 03/01/21 04:36  03/01/21 04:36  03/01/21 04:36  03/01/21 04:36  03/01/21 04:36











General appearance: no acute distress





- EENT


Eyes: PERRL





- Respiratory


Respiratory: bilateral: wheezing





- Cardiovascular


Rhythm: regular


Heart Sounds: Present: S1 & S2





- Gastrointestinal


General gastrointestinal: Present: soft, non-tender, non-distended





- Labs


CBC & Chem 7: 


                                 02/26/21 04:14





                                 02/27/21 04:26


Labs: 


                         Laboratory Results - last 24 hr











  02/28/21 02/28/21 02/28/21





  07:34 12:08 16:37


 


POC Glucose  356 H  344 H  371 H














  02/28/21





  21:17


 


POC Glucose  357 H

## 2021-03-01 NOTE — ULTRASOUND REPORT
ULTRASOUND-GUIDED PARACENTESIS



HISTORY: ascites. 



PROCEDURE:  The risks (including but not limited to bleeding, infection, and bowel injury) and benefi
ts were explained to the patient and informed consent was obtained.  A time out procedure was perform
ed.  



Ultrasound was used to evaluate the abdomen and locate the largest ascites fluid pocket.  Once the sk
in was marked, the procedure site was prepped and draped in the usual sterile fashion and lidocaine w
as used for local anesthesia.  A skin nick was made and a 5 Maltese centesis catheter was placed.  The
 patient was monitored closely throughout the procedure, and a total of 3400 mL of clear yellow fluid
 was aspirated.  Samples were sent to the lab for further evaluation per the primary clinicians order
s.



The patient tolerated the procedure well with no complications. 



IMPRESSION: Successful ultrasound-guided paracentesis as described.



 



Signer Name: Miguel Schwarz Jr, MD 

Signed: 3/1/2021 1:39 PM

Workstation Name: [id number]
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
## 2021-03-01 NOTE — PROCEDURE NOTE
Date of procedure: 03/01/21


Pre-op diagnosis: ascites


Post-op diagnosis: same


Procedure: 





US paracentesis


Findings: 





modrate ascites


Anesthesia: local


Surgeon: MARCEL CALDERON


Estimated blood loss: none


Pathology: list (120cc)


Specimen disposition: to lab


Condition: stable


Disposition: floor

## 2021-03-01 NOTE — PROGRESS NOTE
Assessment and Plan





Patient alert, awake resting on 2 litres O2. O2 saturation 99%. No complaint of 

chest pain, shortness of breath or cough at this time.No acute respiratory 

distress. patient afebrile. has no leukocytosis. Chest xray done 2/25/21 

reported No acute findings.  Patient has history of smoking 1 pack x 25 years. 

Stopped smoking 1 year ago. Drinks alcohol. Denies drug abuse. Patient works as 

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 

and has history of Sleep apnea and uses CPAP at home. Recommend to place him on 

CPAP during night time using same pressure that he is using at home. If do not 

know the pressure, start at 10 cm H20 Pressure. Patient he is admitted for 

swelling of body, legs and abdomen. Patients swelling some what better now.


Patient presently on albuterol/atrovent aerosol treatments, I/V solumedrol. 

levaquin, S/C Heparin and famotidine.





- Patient Problems


(1) COPD with exacerbation


Current Visit: Yes   Status: Acute   


Plan to address problem: 


O2 2 litres via nasal canula.


 Albuterol/atrovent aerosol treatments q 6 hours.


 I/V solumedrol


 S/C Heparin.


 Famotidine.


 I/V Levaquin.








(2) Anasarca


Current Visit: Yes   Status: Acute   


Plan to address problem: 


Patient is on lasix.


 Management as per primary care.








(3) Dyspnea


Current Visit: Yes   Status: Acute   


Qualifiers: 


   Dyspnea type: shortness of breath 


Plan to address problem: 


O2 2 litres via nasal canula.


 Albuterol/atrovent aerosol treatments q 6 hours.


 I/V solumedrol


 S/C Heparin.


 Famotidine.


 I/V Levaquin


I/V Lasix.


ABGs on room air.


PFTs as out patient.








(4) Hypoalbuminemia


Current Visit: Yes   Status: Acute   


Plan to address problem: 


Management as per primary care.








(5) Pulmonary hypertension


Current Visit: Yes   Status: Acute   


Plan to address problem: 


Optimize the treatment for COPD and Sleep apnea.








(6) Morbid obesity with BMI of 40.0-44.9, adult


Current Visit: Yes   Status: Chronic   


Plan to address problem: 


Recommend to loose weight.


 Exercise and diet.


 Use CPAP as he is using at night.








(7) HTN (hypertension)


Current Visit: No   Status: Chronic   


Plan to address problem: 


Management as per primary care.








(8) Tobacco use


Current Visit: No   Status: Chronic   


Plan to address problem: 


Patient said he stopped smoking 1 year ago.








Subjective


Date of service: 03/01/21


Principal diagnosis: Abdominal pain, alcohol abuse, abdominal distention.


Interval history: 





Patient alert, awake resting on 2 litres O2. O2 saturation 99%. No complaint of 

chest pain, shortness of breath or cough at this time.No acute respiratory 

distress. patient afebrile. has no leukocytosis. Chest xray done 2/25/21 

reported No acute findings.  Patient has history of smoking 1 pack x 25 years. 

Stopped smoking 1 year ago. Drinks alcohol. Denies drug abuse. Patient works as 

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 

and has history of Sleep apnea and uses CPAP at home. Recommend to place him on 

CPAP during night time using same pressure that he is using at home. If do not 

know the pressure, start at 10 cm H20 Pressure. Patient he is admitted for 

swelling of body, legs and abdomen. Patients swelling some what better now.


Patient presently on albuterol/atrovent aerosol treatments, I/V solumedrol. 

levaquin, S/C Heparin and famotidine.





Objective


                               Vital Signs - 12hr











  03/01/21 03/01/21 03/01/21





  04:36 08:23 09:04


 


Temperature 97.2 F L 98.6 F 


 


Pulse Rate 107 H 100 H 


 


Pulse Rate [   





Anterior   





Bilateral   





Throughout]   


 


Respiratory 20 20 





Rate   


 


Respiratory   





Rate [Anterior   





Bilateral   





Throughout]   


 


Blood Pressure 121/64 147/87 


 


O2 Sat by Pulse 98 98 99





Oximetry   














  03/01/21 03/01/21 03/01/21





  09:06 10:58 11:28


 


Temperature   


 


Pulse Rate  92 H 92 H


 


Pulse Rate [ 101 H  





Anterior   





Bilateral   





Throughout]   


 


Respiratory   





Rate   


 


Respiratory 18  





Rate [Anterior   





Bilateral   





Throughout]   


 


Blood Pressure  145/80 145/80


 


O2 Sat by Pulse   





Oximetry   














  03/01/21





  11:54


 


Temperature 98.6 F


 


Pulse Rate 99 H


 


Pulse Rate [ 





Anterior 





Bilateral 





Throughout] 


 


Respiratory 18





Rate 


 


Respiratory 





Rate [Anterior 





Bilateral 





Throughout] 


 


Blood Pressure 148/90


 


O2 Sat by Pulse 99





Oximetry 











Constitutional: no acute distress, alert


Eyes: non-icteric


Neck: supple, no lymphadenopathy


Effort: normal


Ascultation: Bilateral: diminished breath sounds, other (Prolonged expiratory 

phase.)


Cardiovascular: regular rate and rhythm


Gastrointestinal: normoactive bowel sounds, soft, non-tender


Integumentary: normal


Extremities: edema


Neurologic: normal mental status, non-focal exam, pupils equal and round, CN II-

XII normal


Psychiatric: mood appropriate


CBC and BMP: 


                                 02/26/21 04:14





                                 03/01/21 07:46


ABG, PT/INR, D-dimer: 


PT/INR, D-dimer











PT  15.9 Sec. (12.2-14.9)  H  02/25/21  15:26    


 


INR  1.27  (0.87-1.13)  H  02/25/21  15:26    








Abnormal lab findings: 


                                  Abnormal Labs











  02/25/21 02/25/21 02/25/21





  15:22 15:26 15:26


 


RBC  3.09 L  


 


Hgb  10.5 L  


 


Hct  31.9 L  


 


MCV  103 H  


 


MCH  34 H  


 


Lymph % (Auto)  8.1 L  


 


Mono % (Auto)  14.1 H  


 


Lymph # (Auto)  0.5 L  


 


Mono # (Auto)  0.9 H  


 


Seg Neutrophils %  76.7 H  


 


PT   15.9 H 


 


INR   1.27 H 


 


Sodium    130 L


 


Potassium   


 


Chloride    88.8 L


 


Carbon Dioxide   


 


BUN    6 L


 


Creatinine   


 


Glucose    334 H


 


POC Glucose   


 


Hemoglobin A1c   


 


Iron   


 


TIBC   


 


Transferrin   


 


Direct Bilirubin   


 


AST    50 H


 


Alkaline Phosphatase    214 H


 


Albumin    3.2 L


 


Amylase   


 


Urine WBC (Auto)   














  02/26/21 02/26/21 02/26/21





  02:22 04:14 04:14


 


RBC   3.10 L 


 


Hgb   10.6 L 


 


Hct   32.5 L 


 


MCV   105 H 


 


MCH   34 H 


 


Lymph % (Auto)   4.9 L 


 


Mono % (Auto)   


 


Lymph # (Auto)   0.3 L 


 


Mono # (Auto)   


 


Seg Neutrophils %   88.0 H 


 


PT   


 


INR   


 


Sodium    132 L


 


Potassium   


 


Chloride    91.0 L


 


Carbon Dioxide   


 


BUN    5 L


 


Creatinine    0.6 L


 


Glucose    236 H


 


POC Glucose   


 


Hemoglobin A1c   


 


Iron   


 


TIBC   


 


Transferrin   


 


Direct Bilirubin   


 


AST    45 H


 


Alkaline Phosphatase    203 H


 


Albumin    3.3 L


 


Amylase   


 


Urine WBC (Auto)  70.0 H  














  02/26/21 02/26/21 02/26/21





  04:14 07:47 13:32


 


RBC   


 


Hgb   


 


Hct   


 


MCV   


 


MCH   


 


Lymph % (Auto)   


 


Mono % (Auto)   


 


Lymph # (Auto)   


 


Mono # (Auto)   


 


Seg Neutrophils %   


 


PT   


 


INR   


 


Sodium   


 


Potassium   


 


Chloride   


 


Carbon Dioxide   


 


BUN   


 


Creatinine   


 


Glucose   


 


POC Glucose    305 H


 


Hemoglobin A1c  9.9 H  


 


Iron   36 L 


 


TIBC   191 L 


 


Transferrin   165 L 


 


Direct Bilirubin   


 


AST   


 


Alkaline Phosphatase   


 


Albumin   


 


Amylase   


 


Urine WBC (Auto)   














  02/26/21 02/26/21 02/27/21





  17:34 22:06 04:26


 


RBC   


 


Hgb   


 


Hct   


 


MCV   


 


MCH   


 


Lymph % (Auto)   


 


Mono % (Auto)   


 


Lymph # (Auto)   


 


Mono # (Auto)   


 


Seg Neutrophils %   


 


PT   


 


INR   


 


Sodium    134 L


 


Potassium    5.4 H D


 


Chloride    91.6 L


 


Carbon Dioxide    18 L


 


BUN   


 


Creatinine   


 


Glucose    360 H


 


POC Glucose   362 H 


 


Hemoglobin A1c   


 


Iron   


 


TIBC   


 


Transferrin   


 


Direct Bilirubin  0.5 H  


 


AST  55 H  


 


Alkaline Phosphatase  201 H  


 


Albumin  3.6 L  


 


Amylase  10 L  


 


Urine WBC (Auto)   














  02/27/21 02/27/21 02/27/21





  08:12 12:27 16:29


 


RBC   


 


Hgb   


 


Hct   


 


MCV   


 


MCH   


 


Lymph % (Auto)   


 


Mono % (Auto)   


 


Lymph # (Auto)   


 


Mono # (Auto)   


 


Seg Neutrophils %   


 


PT   


 


INR   


 


Sodium   


 


Potassium   


 


Chloride   


 


Carbon Dioxide   


 


BUN   


 


Creatinine   


 


Glucose   


 


POC Glucose  385 H  335 H  294 H


 


Hemoglobin A1c   


 


Iron   


 


TIBC   


 


Transferrin   


 


Direct Bilirubin   


 


AST   


 


Alkaline Phosphatase   


 


Albumin   


 


Amylase   


 


Urine WBC (Auto)   














  02/27/21 02/28/21 02/28/21





  21:19 07:34 12:08


 


RBC   


 


Hgb   


 


Hct   


 


MCV   


 


MCH   


 


Lymph % (Auto)   


 


Mono % (Auto)   


 


Lymph # (Auto)   


 


Mono # (Auto)   


 


Seg Neutrophils %   


 


PT   


 


INR   


 


Sodium   


 


Potassium   


 


Chloride   


 


Carbon Dioxide   


 


BUN   


 


Creatinine   


 


Glucose   


 


POC Glucose  351 H  356 H  344 H


 


Hemoglobin A1c   


 


Iron   


 


TIBC   


 


Transferrin   


 


Direct Bilirubin   


 


AST   


 


Alkaline Phosphatase   


 


Albumin   


 


Amylase   


 


Urine WBC (Auto)   














  02/28/21 02/28/21 03/01/21





  16:37 21:17 07:26


 


RBC   


 


Hgb   


 


Hct   


 


MCV   


 


MCH   


 


Lymph % (Auto)   


 


Mono % (Auto)   


 


Lymph # (Auto)   


 


Mono # (Auto)   


 


Seg Neutrophils %   


 


PT   


 


INR   


 


Sodium   


 


Potassium   


 


Chloride   


 


Carbon Dioxide   


 


BUN   


 


Creatinine   


 


Glucose   


 


POC Glucose  371 H  357 H  347 H


 


Hemoglobin A1c   


 


Iron   


 


TIBC   


 


Transferrin   


 


Direct Bilirubin   


 


AST   


 


Alkaline Phosphatase   


 


Albumin   


 


Amylase   


 


Urine WBC (Auto)   














  03/01/21





  07:46


 


RBC 


 


Hgb 


 


Hct 


 


MCV 


 


MCH 


 


Lymph % (Auto) 


 


Mono % (Auto) 


 


Lymph # (Auto) 


 


Mono # (Auto) 


 


Seg Neutrophils % 


 


PT 


 


INR 


 


Sodium  136 L


 


Potassium 


 


Chloride  92.0 L


 


Carbon Dioxide  21 L


 


BUN 


 


Creatinine 


 


Glucose  376 H


 


POC Glucose 


 


Hemoglobin A1c 


 


Iron 


 


TIBC 


 


Transferrin 


 


Direct Bilirubin 


 


AST 


 


Alkaline Phosphatase 


 


Albumin 


 


Amylase 


 


Urine WBC (Auto) 











Chest x-ray: report reviewed, image reviewed


Additional Studies: 





CHEST 1 VIEW 2/25/21





INDICATION: DYSPNEA. 





COMPARISON: 4/8/2020 





FINDINGS: 


Support devices: None. 





Heart: Within normal limits. 


Lungs/Pleura: No acute air space or interstitial disease. 





Additional findings: None. 





IMPRESSION: 


No acute findings or change since 4/8/2020.

## 2021-03-02 PROCEDURE — 5A09357 ASSISTANCE WITH RESPIRATORY VENTILATION, LESS THAN 24 CONSECUTIVE HOURS, CONTINUOUS POSITIVE AIRWAY PRESSURE: ICD-10-PCS | Performed by: INTERNAL MEDICINE

## 2021-03-02 PROCEDURE — 4A033R1 MEASUREMENT OF ARTERIAL SATURATION, PERIPHERAL, PERCUTANEOUS APPROACH: ICD-10-PCS | Performed by: INTERNAL MEDICINE

## 2021-03-02 RX ADMIN — Medication SCH: at 22:33

## 2021-03-02 RX ADMIN — Medication SCH MG: at 10:50

## 2021-03-02 RX ADMIN — INSULIN HUMAN SCH UNIT: 100 INJECTION, SUSPENSION SUBCUTANEOUS at 17:39

## 2021-03-02 RX ADMIN — INSULIN HUMAN SCH UNIT: 100 INJECTION, SUSPENSION SUBCUTANEOUS at 10:42

## 2021-03-02 RX ADMIN — FAMOTIDINE SCH MG: 20 TABLET ORAL at 10:46

## 2021-03-02 RX ADMIN — HEPARIN SODIUM SCH UNIT: 5000 INJECTION, SOLUTION INTRAVENOUS; SUBCUTANEOUS at 10:58

## 2021-03-02 RX ADMIN — BUDESONIDE SCH MG: 0.5 INHALANT RESPIRATORY (INHALATION) at 19:53

## 2021-03-02 RX ADMIN — METHYLPREDNISOLONE SODIUM SUCCINATE SCH: 40 INJECTION, POWDER, FOR SOLUTION INTRAMUSCULAR; INTRAVENOUS at 22:32

## 2021-03-02 RX ADMIN — FUROSEMIDE SCH MG: 40 TABLET ORAL at 22:37

## 2021-03-02 RX ADMIN — FUROSEMIDE SCH MG: 10 INJECTION, SOLUTION INTRAVENOUS at 07:04

## 2021-03-02 RX ADMIN — FLUTICASONE PROPIONATE SCH MCG: 50 SPRAY, METERED NASAL at 10:43

## 2021-03-02 RX ADMIN — METHYLPREDNISOLONE SODIUM SUCCINATE SCH MG: 40 INJECTION, POWDER, FOR SOLUTION INTRAMUSCULAR; INTRAVENOUS at 10:46

## 2021-03-02 RX ADMIN — NICOTINE SCH: 21 PATCH TRANSDERMAL at 10:45

## 2021-03-02 RX ADMIN — ASPIRIN SCH MG: 325 TABLET ORAL at 10:46

## 2021-03-02 RX ADMIN — IPRATROPIUM BROMIDE AND ALBUTEROL SULFATE SCH: .5; 3 SOLUTION RESPIRATORY (INHALATION) at 23:11

## 2021-03-02 RX ADMIN — ARFORMOTEROL TARTRATE SCH MCG: 15 SOLUTION RESPIRATORY (INHALATION) at 19:53

## 2021-03-02 RX ADMIN — BENZONATATE SCH: 100 CAPSULE ORAL at 16:23

## 2021-03-02 RX ADMIN — HEPARIN SODIUM SCH UNIT: 5000 INJECTION, SOLUTION INTRAVENOUS; SUBCUTANEOUS at 21:17

## 2021-03-02 RX ADMIN — BENZONATATE SCH MG: 100 CAPSULE ORAL at 07:04

## 2021-03-02 RX ADMIN — BENZONATATE SCH MG: 100 CAPSULE ORAL at 21:07

## 2021-03-02 RX ADMIN — FAMOTIDINE SCH MG: 20 TABLET ORAL at 21:07

## 2021-03-02 RX ADMIN — IPRATROPIUM BROMIDE AND ALBUTEROL SULFATE SCH: .5; 3 SOLUTION RESPIRATORY (INHALATION) at 12:42

## 2021-03-02 RX ADMIN — ARFORMOTEROL TARTRATE SCH MCG: 15 SOLUTION RESPIRATORY (INHALATION) at 08:14

## 2021-03-02 RX ADMIN — IPRATROPIUM BROMIDE AND ALBUTEROL SULFATE SCH: .5; 3 SOLUTION RESPIRATORY (INHALATION) at 06:13

## 2021-03-02 RX ADMIN — FUROSEMIDE SCH: 10 INJECTION, SOLUTION INTRAVENOUS at 17:38

## 2021-03-02 RX ADMIN — MULTIVITAMIN TABLET SCH EACH: TABLET at 10:58

## 2021-03-02 RX ADMIN — NEPHROCAP SCH CAP: 1 CAP ORAL at 10:46

## 2021-03-02 RX ADMIN — LISINOPRIL SCH MG: 10 TABLET ORAL at 10:58

## 2021-03-02 RX ADMIN — INSULIN LISPRO SCH UNIT: 100 INJECTION, SOLUTION INTRAVENOUS; SUBCUTANEOUS at 10:42

## 2021-03-02 RX ADMIN — BUDESONIDE SCH MG: 0.5 INHALANT RESPIRATORY (INHALATION) at 08:14

## 2021-03-02 RX ADMIN — INSULIN LISPRO SCH UNIT: 100 INJECTION, SOLUTION INTRAVENOUS; SUBCUTANEOUS at 12:30

## 2021-03-02 RX ADMIN — INSULIN LISPRO SCH UNIT: 100 INJECTION, SOLUTION INTRAVENOUS; SUBCUTANEOUS at 17:41

## 2021-03-02 RX ADMIN — INSULIN LISPRO SCH UNIT: 100 INJECTION, SOLUTION INTRAVENOUS; SUBCUTANEOUS at 21:16

## 2021-03-02 RX ADMIN — IPRATROPIUM BROMIDE AND ALBUTEROL SULFATE SCH AMPUL: .5; 3 SOLUTION RESPIRATORY (INHALATION) at 08:14

## 2021-03-02 RX ADMIN — PRAVASTATIN SODIUM SCH MG: 20 TABLET ORAL at 21:07

## 2021-03-02 NOTE — DISCHARGE SUMMARY
Providers





- Providers


Date of Admission: 


02/26/21 14:43





Date of discharge: 03/02/21


Attending physician: 


ADWOA NOLAN





                                        





02/25/21 23:06


Consult to Physician [CONS] Routine 


   Comment: 


   Consulting Provider: NASIM NEWTON


   Physician Instructions: 


   Reason For Exam: Copd





02/26/21 09:45


Consult to Dietitian/Nutrition [CONS] Routine 


   Physician Instructions: 


   Reason For Exam: 


   Reason for Consult: Diet education





02/26/21 10:05


Consult to Physician [CONS] Routine 


   Comment: 


   Consulting Provider: KELLEE HALL


   Physician Instructions: 


   Reason For Exam: abdominal selling, diarrhea





02/28/21 12:55


Physical Therapy Evaluation and Treat [CONS] Routine 


   Comment: 


   Reason For Exam: Debility











Primary care physician: 


PRIMARY CARE MD








Hospitalization


Reason for admission: COPD exac, UTI, ascites


Condition: Fair


Hospital course: 





This is a 46-year-old male with hypertension, COPD, LORE on CPAP, CAD pulmonary 

hypertension, alcohol abuse and former tobacco abuse presented to the emergency 

department on 2/25 with swelling and pain to bilateral lower extremities and 

abdomen associated with shortness of breath and some intermittent chest 

discomfort which has been ongoing for 2 months and progressively worsening 

during visit to emergency department via EMS.  Work-up in the emergency 

department revealed hyponatremia, hypochloremia, hyperglycemia and elevated 

liver enzymes.  Patient was admitted to the hospitalist service with consults to

 pulmonology and gastroenterology.  Patient was found to have the diagnoses 

listed below.  Hospital course:





Urinary tract infection


-2/26 urinalysis shows small leukocyte esterase and no nitrates


-2/26 urine culture pending


-Currently being treated with Levaquin was given initial dose of Rocephin.  No 

dysuria no fever.


-Supportive care stable resolving


-Discharge home 1 to 2 days with Levaquin to complete 7 days.





COPD


-Pulmonary consult, appreciate recommendations


-Steroid therapy significant improvment


-Levaquin IV we will also treat underlying UTI as well.


-Pulmicort scheduled, Proventil as needed


-Supportive care


-Supplemental oxygen as needed


-Pulmonary hygiene


-Solu-Medrol has improved with Solu-Medrol.


-Steroid taper upon discharge.





Abdominal distention has resolved.  Further work-up for cirrhosis can be 

completed outpatient.  Markers unremarkable at this time.


-2/25 bilateral lower extremity Doppler ultrasound shows no sonographic evidence

 of DVT in either lower extremity


-2/26 CT abd/pelvis shows diffuse hepatic steatosis with splenomegaly.  And 

ascites secondary to cirrhosis.


-2/26 Stool studies pending


-2/26 C. difficile pending





-GI following appreciate recommendations.





EtOH abuse


-Patient admits to drinking 9 beers per day and can go without alcohol intake 

for 4 days


-Seizure/fall/aspiration precautions


-CIWA protocol doing well at this particular time patient is out of any DTs.


-Supportive care


-Folate, Thiamine, multivitamin supplementation





Diastolic congestive heart failure patient has chronic diastolic heart failure 

well compensated at this time.  Stable for discharge.


-2/25 proBNP 137


-2/26 echocardiogram shows normal global left ventricle systolic function, no 

left ventricular hypertrophy, estimated fraction 55 to 60%, trace MR, trace TR, 

moderate pleural effusion, no pericardial effusion, RVSP calculated 20 mmHg, ri

ght ventricular global systolic function is normal


Iron deficiency anemia


-Lasix stable with current diuresis.  Can change diuresis to p.o. and 

anticipated discharge soon.


-Daily weights


-Strict intake and output 








Anemia


-Presented with H/H of 10.5/31.9 with elevated MVC and MCH 


-2/26 iron studies: Iron 36, TIBC 91, percent saturation 18.85, transferrin 165


-Multivitamin





Hyponatremia


-Presented with a sodium of 130, corrected sodium 134


-Trend BMP


-Avoid rapid correction


-Monitor neuro status


-Aviod MIVF in setting of ABD distention bilateral lower extremity swelling





Hypochloremia


-Presented with a chloride of 88


-Trend BMP


-Aviod MIVF in setting of ABD distention bilateral lower extremity swelling





Morbid obesity


-Encourage dietary and lifestyle modifications


-Consider bariatric surgery outpatient





Hypertension


-Restart home hypertensive regimen and titrate as needed


-Blood pressure monitoring per protocol 


-Hydralazine 10 mg IVP for SBP greater than 160





LORE


-Continue home CPAP therapy


-Supportive care





Diabetes mellitus


-2/26 hemoglobin A1c 9.9


-Presented with hyperglycemia 334


-SSI


-Accu-Cheks AC at bedtime


-CC cardiac diet


-Initiate long-term insulin as needed


We will start Levemir 15 units nightly.





CAD


-Continue home statin therapy


-Supportive care





Hypoalbuminemia


-Presented with albumin of 3.2


-Nutrition consulted, appreciate recommendations





Tobacco abuse


-Former smoker


-Resume home NicoDerm patches





-Debility at this particular time patient will need physical therapy.  Also 

problematic secondary to patient's obesity.


Physical therapy evaluation.  Patient made be candidate for discharge with home 

PT.





DVT prophylaxis


-GI prophylaxis


-Heparin subcu








2/26: Patient complains of a 2-month history of diarrhea EtOH abuse.  GI was 

consulted, CIWA protocol initiated, pulmonology was consulted.  Patient was 

started on steroids, multivitamins, and his medications were consulted.  Stool 

studies were ordered and a CT abdomen/pelvis is pending.





2/27; patient states he feels better.  I just with the swelling will go down.  

Treated with BiPAP overnight.  Morbidly obese.  Clinically from evaluating 

patient or nurse at bedside as well as previous progress notes edema has 

improved.





2/28 patient continues to improve.  Swelling has resolved.  Still have some 

swelling on abdomen.  Clinically patient doing much better.  Hospital course 

complicated by debility.  Patient has not walked since he has been here.  And 

although improving will need physical therapy.  Blood sugars also uncontrolled 

we will change medical management today.





3/1; patient today attempted to get up again from bed and was so deconditioned 

and obese had difficulty getting himself up at the side of the bed.  Hospital 

course complicated by debility awaiting physical therapy eval today.


If patient is candidate for discharge with home PT can discharge in a.m.





3/2; continue IV Levaquin and taper Solu-Medrol.  Physical therapy evaluated the

 patient and recommended cardiac rehab as an outpatient.  Patient will be 

discharged with Medrol Dosepak, breathing treatments and follow-up with PCP.  

Patient is s/p ultrasound-guided paracentesis without complications yesterday.  

Patient is felt to receive maximal hospital benefit and will be discharged home.

  Dedicated discharge time 35 minutes.








Disposition: DC-01 TO HOME OR SELFCARE


Time spent for discharge: 35





- Discharge Diagnoses


(1) UTI (urinary tract infection)


Status: Acute   





(2) Anasarca


Status: Acute   





(3) COPD with exacerbation


Status: Acute   





(4) Hypoalbuminemia


Status: Acute   





(5) Pulmonary hypertension


Status: Acute   





(6) Morbid obesity with BMI of 40.0-44.9, adult


Status: Chronic   





(7) Accelerated hypertension


Status: Acute   





(8) Acute bronchitis


Status: Acute   





(9) HTN (hypertension)


Status: Chronic   





(10) Tobacco use


Status: Chronic   





Core Measure Documentation





- Palliative Care


Palliative Care/ Comfort Measures: Not Applicable





- Core Measures


Any of the following diagnoses?: none





Exam





- Constitutional


Vitals: 


                                        











Temp Pulse Resp BP Pulse Ox


 


 98.0 F   95 H  20   141/79   99 


 


 03/02/21 07:33  03/02/21 08:42  03/02/21 08:42  03/02/21 07:33  03/02/21 08:15











General appearance: Present: no acute distress, well-nourished





- EENT


Eyes: Present: PERRL


ENT: hearing intact, clear oral mucosa





- Neck


Neck: Present: supple, normal ROM





- Respiratory


Respiratory effort: normal


Respiratory: bilateral: CTA





- Cardiovascular


Heart Sounds: Present: S1 & S2.  Absent: rub, click





- Extremities


Extremities: pulses symmetrical, No edema


Peripheral Pulses: within normal limits





- Abdominal


General gastrointestinal: Present: soft, non-tender, non-distended, normal bowel

 sounds


Male genitourinary: Present: normal





- Integumentary


Integumentary: Present: clear, warm, dry





- Musculoskeletal


Musculoskeletal: gait normal, strength equal bilaterally





- Psychiatric


Psychiatric: appropriate mood/affect, intact judgment & insight





- Neurologic


Neurologic: CNII-XII intact, moves all extremities





Plan


Activity: advance as tolerated


Weight Bearing Status: Weight Bear as Tolerated


Diet: low fat, low cholesterol, low salt


Follow up with: 


PRIMARY CARE,MD [Primary Care Provider] - 7 Days


Prescriptions: 


amLODIPine 5 mg PO QDAY #30 tablet


Aspirin 325 mg PO QDAY #30 tablet


Ipratropium (Nf) [Atrovent HFA 17MCG/PUFF] 2 puff IH Q6HR PRN #1 inha


 PRN Reason: Dyspnea


Fluticasone [Flonase] 1 spray NS QDAY #1 bottle


Nicotine [Habitrol] 21 mg TD QDAY #30 patch


Multivitamin Tab [Multiple Vitamin TAB (Theragran)] 1 each PO QDAY #30 tablet


Insulin NPH/Regular [NovoLIN 70/30] 15 unit SUB-Q BIDDIAB 30 Days  units


Famotidine [Pepcid] 20 mg PO BID #60 tablet


Famotidine [Pepcid] 20 mg PO BID #60 tablet


Pravastatin Sodium [Pravastatin] 10 mg PO QHS #30 tablet


Prednisone [predniSONE 10 mg (6-Day Pack, 21 Tabs)] 10 mg PO .TAPER #1 tab.ds.pk


Albuterol Sulfate [Proair Respiclick] 90 mcg IH Q4HR PRN #2 aer.pow.ba


 PRN Reason: Wheezing


Folic Acid/Vit B Comp W-C [Renal Caps] 1 cap PO QDAY #30 capsule


Thiamine [Vitamin B-1] 100 mg PO QDAY #30 tablet


lisinopriL [Zestril TAB] 10 mg PO QDAY #30 tablet

## 2021-03-02 NOTE — PROGRESS NOTE
Assessment and Plan





atient alert, awake resting on 2 litres O2. O2 saturation 91%. No complaint of 

chest pain, shortness of breath or cough at this time.No acute respiratory 

distress. patient afebrile. has no leukocytosis. Chest xray done 2/25/21 

reported No acute findings.  Patient has history of smoking 1 pack x 25 years. 

Stopped smoking 1 year ago. Drinks alcohol. Denies drug abuse. Patient works as 

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 

and has history of Sleep apnea and uses CPAP at home.  BIPAP standby in the 

room. Patient he is admitted for swelling of body, legs and abdomen. Patients 

swelling some what better now.


Patient presently on albuterol/atrovent aerosol treatments, PO Prednisone. 

levaquin, S/C Heparin and famotidine.





- Patient Problems


(1) COPD with exacerbation


Current Visit: Yes   Status: Acute   


Plan to address problem: 


O2 2 litres via nasal canula.


 Albuterol/atrovent aerosol treatments q 6 hours.


 PO Prednisone


 S/C Heparin.


 Famotidine.


 I/V Levaquin.








(2) Anasarca


Current Visit: Yes   Status: Acute   


Plan to address problem: 


Patient is on lasix.


 Management as per primary care.








(3) Dyspnea


Current Visit: Yes   Status: Acute   


Qualifiers: 


   Dyspnea type: shortness of breath   Qualified Code(s): R06.02 - Shortness of 

breath; R06.00 - Dyspnea, unspecified; R06.01 - Orthopnea   


Plan to address problem: 


O2 2 litres via nasal canula.


 Albuterol/atrovent aerosol treatments q 6 hours.


 PO Prednisone.


 S/C Heparin.


 Famotidine.


 I/V Levaquin


I/V Lasix.


PFTs as out patient.








(4) Hypoalbuminemia


Current Visit: Yes   Status: Acute   


Plan to address problem: 


Management as per primary care.








(5) Pulmonary hypertension


Current Visit: Yes   Status: Acute   


Plan to address problem: 


Optimize the treatment for COPD and Sleep apnea.








(6) Morbid obesity with BMI of 40.0-44.9, adult


Current Visit: Yes   Status: Chronic   


Plan to address problem: 


Recommend to loose weight.


 Exercise and diet.


 Use CPAP as he is using at night.








(7) HTN (hypertension)


Current Visit: No   Status: Chronic   


Plan to address problem: 


Management as per primary care.








(8) Tobacco use


Current Visit: No   Status: Chronic   


Plan to address problem: 


Patient said he stopped smoking 1 year ago.








Subjective


Date of service: 03/02/21


Principal diagnosis: Abdominal pain, alcohol abuse, abdominal distention.


Interval history: 





Patient alert, awake resting on 2 litres O2. O2 saturation 91%. No complaint of 

chest pain, shortness of breath or cough at this time.No acute respiratory 

distress. patient afebrile. has no leukocytosis. Chest xray done 2/25/21 

reported No acute findings.  Patient has history of smoking 1 pack x 25 years. 

Stopped smoking 1 year ago. Drinks alcohol. Denies drug abuse. Patient works as 

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 

and has history of Sleep apnea and uses CPAP at home.  BIPAP standby in the 

room. Patient he is admitted for swelling of body, legs and abdomen. Patients 

swelling some what better now.


Patient presently on albuterol/atrovent aerosol treatments, PO Prednisone. 

levaquin, S/C Heparin and famotidine.





Objective


                               Vital Signs - 12hr











  03/02/21 03/02/21 03/02/21





  00:38 01:46 03:29


 


Temperature 97.5 F L  97.3 F L


 


Pulse Rate 92 H 92 H 96 H


 


Pulse Rate [   





Anterior   





Bilateral   





Throughout]   


 


Respiratory 20 19 20





Rate   


 


Respiratory   





Rate [Anterior   





Bilateral   





Throughout]   


 


Blood Pressure 162/90  167/89


 


O2 Sat by Pulse 100 100 99





Oximetry   














  03/02/21 03/02/21 03/02/21





  07:33 08:15 08:42


 


Temperature 98.0 F  


 


Pulse Rate 90  


 


Pulse Rate [   95 H





Anterior   





Bilateral   





Throughout]   


 


Respiratory 20  





Rate   


 


Respiratory   20





Rate [Anterior   





Bilateral   





Throughout]   


 


Blood Pressure 141/79  


 


O2 Sat by Pulse 100 99 





Oximetry   














  03/02/21 03/02/21





  10:50 10:58


 


Temperature  


 


Pulse Rate 94 H 92 H


 


Pulse Rate [  





Anterior  





Bilateral  





Throughout]  


 


Respiratory  





Rate  


 


Respiratory  





Rate [Anterior  





Bilateral  





Throughout]  


 


Blood Pressure 143/85 143/85


 


O2 Sat by Pulse  





Oximetry  











Constitutional: no acute distress, alert


Eyes: non-icteric


Neck: supple, no lymphadenopathy


Effort: normal


Ascultation: Bilateral: diminished breath sounds, other (Prolonged expiratory 

phase.)


Cardiovascular: regular rate and rhythm


Gastrointestinal: normoactive bowel sounds, soft, non-tender


Integumentary: normal


Extremities: edema


Neurologic: normal mental status, non-focal exam, pupils equal and round, CN II-

XII normal


Psychiatric: mood appropriate


CBC and BMP: 


                                 02/26/21 04:14





                                 03/01/21 07:46


ABG, PT/INR, D-dimer: 


PT/INR, D-dimer











PT  15.9 Sec. (12.2-14.9)  H  02/25/21  15:26    


 


INR  1.27  (0.87-1.13)  H  02/25/21  15:26    








Abnormal lab findings: 


                                  Abnormal Labs











  02/25/21 02/25/21 02/25/21





  15:22 15:26 15:26


 


RBC  3.09 L  


 


Hgb  10.5 L  


 


Hct  31.9 L  


 


MCV  103 H  


 


MCH  34 H  


 


Lymph % (Auto)  8.1 L  


 


Mono % (Auto)  14.1 H  


 


Lymph # (Auto)  0.5 L  


 


Mono # (Auto)  0.9 H  


 


Seg Neutrophils %  76.7 H  


 


PT   15.9 H 


 


INR   1.27 H 


 


Sodium    130 L


 


Potassium   


 


Chloride    88.8 L


 


Carbon Dioxide   


 


BUN    6 L


 


Creatinine   


 


Glucose    334 H


 


POC Glucose   


 


Hemoglobin A1c   


 


Iron   


 


TIBC   


 


Transferrin   


 


Direct Bilirubin   


 


AST    50 H


 


Alkaline Phosphatase    214 H


 


Albumin    3.2 L


 


Amylase   


 


Urine WBC (Auto)   














  02/26/21 02/26/21 02/26/21





  02:22 04:14 04:14


 


RBC   3.10 L 


 


Hgb   10.6 L 


 


Hct   32.5 L 


 


MCV   105 H 


 


MCH   34 H 


 


Lymph % (Auto)   4.9 L 


 


Mono % (Auto)   


 


Lymph # (Auto)   0.3 L 


 


Mono # (Auto)   


 


Seg Neutrophils %   88.0 H 


 


PT   


 


INR   


 


Sodium    132 L


 


Potassium   


 


Chloride    91.0 L


 


Carbon Dioxide   


 


BUN    5 L


 


Creatinine    0.6 L


 


Glucose    236 H


 


POC Glucose   


 


Hemoglobin A1c   


 


Iron   


 


TIBC   


 


Transferrin   


 


Direct Bilirubin   


 


AST    45 H


 


Alkaline Phosphatase    203 H


 


Albumin    3.3 L


 


Amylase   


 


Urine WBC (Auto)  70.0 H  














  02/26/21 02/26/21 02/26/21





  04:14 07:47 13:32


 


RBC   


 


Hgb   


 


Hct   


 


MCV   


 


MCH   


 


Lymph % (Auto)   


 


Mono % (Auto)   


 


Lymph # (Auto)   


 


Mono # (Auto)   


 


Seg Neutrophils %   


 


PT   


 


INR   


 


Sodium   


 


Potassium   


 


Chloride   


 


Carbon Dioxide   


 


BUN   


 


Creatinine   


 


Glucose   


 


POC Glucose    305 H


 


Hemoglobin A1c  9.9 H  


 


Iron   36 L 


 


TIBC   191 L 


 


Transferrin   165 L 


 


Direct Bilirubin   


 


AST   


 


Alkaline Phosphatase   


 


Albumin   


 


Amylase   


 


Urine WBC (Auto)   














  02/26/21 02/26/21 02/27/21





  17:34 22:06 04:26


 


RBC   


 


Hgb   


 


Hct   


 


MCV   


 


MCH   


 


Lymph % (Auto)   


 


Mono % (Auto)   


 


Lymph # (Auto)   


 


Mono # (Auto)   


 


Seg Neutrophils %   


 


PT   


 


INR   


 


Sodium    134 L


 


Potassium    5.4 H D


 


Chloride    91.6 L


 


Carbon Dioxide    18 L


 


BUN   


 


Creatinine   


 


Glucose    360 H


 


POC Glucose   362 H 


 


Hemoglobin A1c   


 


Iron   


 


TIBC   


 


Transferrin   


 


Direct Bilirubin  0.5 H  


 


AST  55 H  


 


Alkaline Phosphatase  201 H  


 


Albumin  3.6 L  


 


Amylase  10 L  


 


Urine WBC (Auto)   














  02/27/21 02/27/21 02/27/21





  08:12 12:27 16:29


 


RBC   


 


Hgb   


 


Hct   


 


MCV   


 


MCH   


 


Lymph % (Auto)   


 


Mono % (Auto)   


 


Lymph # (Auto)   


 


Mono # (Auto)   


 


Seg Neutrophils %   


 


PT   


 


INR   


 


Sodium   


 


Potassium   


 


Chloride   


 


Carbon Dioxide   


 


BUN   


 


Creatinine   


 


Glucose   


 


POC Glucose  385 H  335 H  294 H


 


Hemoglobin A1c   


 


Iron   


 


TIBC   


 


Transferrin   


 


Direct Bilirubin   


 


AST   


 


Alkaline Phosphatase   


 


Albumin   


 


Amylase   


 


Urine WBC (Auto)   














  02/27/21 02/28/21 02/28/21





  21:19 07:34 12:08


 


RBC   


 


Hgb   


 


Hct   


 


MCV   


 


MCH   


 


Lymph % (Auto)   


 


Mono % (Auto)   


 


Lymph # (Auto)   


 


Mono # (Auto)   


 


Seg Neutrophils %   


 


PT   


 


INR   


 


Sodium   


 


Potassium   


 


Chloride   


 


Carbon Dioxide   


 


BUN   


 


Creatinine   


 


Glucose   


 


POC Glucose  351 H  356 H  344 H


 


Hemoglobin A1c   


 


Iron   


 


TIBC   


 


Transferrin   


 


Direct Bilirubin   


 


AST   


 


Alkaline Phosphatase   


 


Albumin   


 


Amylase   


 


Urine WBC (Auto)   














  02/28/21 02/28/21 03/01/21





  16:37 21:17 07:26


 


RBC   


 


Hgb   


 


Hct   


 


MCV   


 


MCH   


 


Lymph % (Auto)   


 


Mono % (Auto)   


 


Lymph # (Auto)   


 


Mono # (Auto)   


 


Seg Neutrophils %   


 


PT   


 


INR   


 


Sodium   


 


Potassium   


 


Chloride   


 


Carbon Dioxide   


 


BUN   


 


Creatinine   


 


Glucose   


 


POC Glucose  371 H  357 H  347 H


 


Hemoglobin A1c   


 


Iron   


 


TIBC   


 


Transferrin   


 


Direct Bilirubin   


 


AST   


 


Alkaline Phosphatase   


 


Albumin   


 


Amylase   


 


Urine WBC (Auto)   














  03/01/21 03/01/21 03/01/21





  07:46 10:54 15:42


 


RBC   


 


Hgb   


 


Hct   


 


MCV   


 


MCH   


 


Lymph % (Auto)   


 


Mono % (Auto)   


 


Lymph # (Auto)   


 


Mono # (Auto)   


 


Seg Neutrophils %   


 


PT   


 


INR   


 


Sodium  136 L  


 


Potassium   


 


Chloride  92.0 L  


 


Carbon Dioxide  21 L  


 


BUN   


 


Creatinine   


 


Glucose  376 H  


 


POC Glucose   379 H  365 H


 


Hemoglobin A1c   


 


Iron   


 


TIBC   


 


Transferrin   


 


Direct Bilirubin   


 


AST   


 


Alkaline Phosphatase   


 


Albumin   


 


Amylase   


 


Urine WBC (Auto)   














  03/01/21 03/02/21 03/02/21





  21:14 07:47 11:12


 


RBC   


 


Hgb   


 


Hct   


 


MCV   


 


MCH   


 


Lymph % (Auto)   


 


Mono % (Auto)   


 


Lymph # (Auto)   


 


Mono # (Auto)   


 


Seg Neutrophils %   


 


PT   


 


INR   


 


Sodium   


 


Potassium   


 


Chloride   


 


Carbon Dioxide   


 


BUN   


 


Creatinine   


 


Glucose   


 


POC Glucose  337 H  330 H  397 H


 


Hemoglobin A1c   


 


Iron   


 


TIBC   


 


Transferrin   


 


Direct Bilirubin   


 


AST   


 


Alkaline Phosphatase   


 


Albumin   


 


Amylase   


 


Urine WBC (Auto)

## 2021-03-02 NOTE — PROGRESS NOTE
Assessment and Plan


Assessment and plan: 





This is a 46-year-old male with hypertension, COPD, LORE on CPAP, CAD pulmonary 

hypertension, alcohol abuse and former tobacco abuse presented to the emergency 

department on 2/25 with swelling and pain to bilateral lower extremities and 

abdomen associated with shortness of breath and some intermittent chest 

discomfort which has been ongoing for 2 months and progressively worsening 

during visit to emergency department via EMS.  Work-up in the emergency 

department revealed hyponatremia, hypochloremia, hyperglycemia and elevated 

liver enzymes.  Patient was admitted to the hospitalist service with consults to

pulmonology and gastroenterology.  Patient was found to have the diagnoses 

listed below.  Hospital course:





Urinary tract infection


-2/26 urinalysis shows small leukocyte esterase and no nitrates


-2/26 urine culture pending


-Currently being treated with Levaquin was given initial dose of Rocephin.  No 

dysuria no fever.


-Supportive care stable resolving


-Discharge home 1 to 2 days with Levaquin to complete 7 days.





COPD


-Pulmonary consult, appreciate recommendations


-Steroid therapy significant improvment


-Levaquin IV we will also treat underlying UTI as well.


-Pulmicort scheduled, Proventil as needed


-Supportive care


-Supplemental oxygen as needed


-Pulmonary hygiene


-Solu-Medrol has improved with Solu-Medrol.


-Steroid taper upon discharge.





Abdominal distention has resolved.  Further work-up for cirrhosis can be 

completed outpatient.  Markers unremarkable at this time.


-2/25 bilateral lower extremity Doppler ultrasound shows no sonographic evidence

of DVT in either lower extremity


-2/26 CT abd/pelvis shows diffuse hepatic steatosis with splenomegaly.  And 

ascites secondary to cirrhosis.


-2/26 Stool studies pending


-2/26 C. difficile pending





-GI following appreciate recommendations.





EtOH abuse


-Patient admits to drinking 9 beers per day and can go without alcohol intake 

for 4 days


-Seizure/fall/aspiration precautions


-CIWA protocol doing well at this particular time patient is out of any DTs.


-Supportive care


-Folate, Thiamine, multivitamin supplementation





Diastolic congestive heart failure patient has chronic diastolic heart failure 

well compensated at this time.  Stable for discharge.


-2/25 proBNP 137


-2/26 echocardiogram shows normal global left ventricle systolic function, no 

left ventricular hypertrophy, estimated fraction 55 to 60%, trace MR, trace TR, 

moderate pleural effusion, no pericardial effusion, RVSP calculated 20 mmHg, 

right ventricular global systolic function is normal


Iron deficiency anemia


-Lasix stable with current diuresis.  Can change diuresis to p.o. and 

anticipated discharge soon.


-Daily weights


-Strict intake and output 








Anemia


-Presented with H/H of 10.5/31.9 with elevated MVC and MCH 


-2/26 iron studies: Iron 36, TIBC 91, percent saturation 18.85, transferrin 165


-Multivitamin





Hyponatremia


-Presented with a sodium of 130, corrected sodium 134


-Trend BMP


-Avoid rapid correction


-Monitor neuro status


-Aviod MIVF in setting of ABD distention bilateral lower extremity swelling





Hypochloremia


-Presented with a chloride of 88


-Trend BMP


-Aviod MIVF in setting of ABD distention bilateral lower extremity swelling





Morbid obesity


-Encourage dietary and lifestyle modifications


-Consider bariatric surgery outpatient





Hypertension


-Restart home hypertensive regimen and titrate as needed


-Blood pressure monitoring per protocol 


-Hydralazine 10 mg IVP for SBP greater than 160





LORE


-Continue home CPAP therapy


-Supportive care





Diabetes mellitus


-2/26 hemoglobin A1c 9.9


-Presented with hyperglycemia 334


-SSI


-Accu-Cheks AC at bedtime


-CC cardiac diet


-Initiate long-term insulin as needed


We will start Levemir 15 units nightly.





CAD


-Continue home statin therapy


-Supportive care





Hypoalbuminemia


-Presented with albumin of 3.2


-Nutrition consulted, appreciate recommendations





Tobacco abuse


-Former smoker


-Resume home NicoDerm patches





-Debility at this particular time patient will need physical therapy.  Also 

problematic secondary to patient's obesity.


Physical therapy evaluation.  Patient made be candidate for discharge with home 

PT.





DVT prophylaxis


-GI prophylaxis


-Heparin subcu








2/26: Patient complains of a 2-month history of diarrhea EtOH abuse.  GI was 

consulted, CIWA protocol initiated, pulmonology was consulted.  Patient was 

started on steroids, multivitamins, and his medications were consulted.  Stool 

studies were ordered and a CT abdomen/pelvis is pending.





2/27; patient states he feels better.  I just with the swelling will go down.  

Treated with BiPAP overnight.  Morbidly obese.  Clinically from evaluating 

patient or nurse at bedside as well as previous progress notes edema has 

improved.





2/28 patient continues to improve.  Swelling has resolved.  Still have some 

swelling on abdomen.  Clinically patient doing much better.  Hospital course 

complicated by debility.  Patient has not walked since he has been here.  And 

although improving will need physical therapy.  Blood sugars also uncontrolled 

we will change medical management today.





3/1; patient today attempted to get up again from bed and was so deconditioned 

and obese had difficulty getting himself up at the side of the bed.  Hospital 

course complicated by debility awaiting physical therapy eval today.


If patient is candidate for discharge with home PT can discharge in a.m.





3/2; continue IV Levaquin and taper Solu-Medrol.  Physical therapy evaluated the

patient and recommended cardiac rehab as an outpatient.  Patient will be 

discharged with Medrol Dosepak, breathing treatments and follow-up with PCP.  

Patient is s/p ultrasound-guided paracentesis without complications yesterday.  

Patient is felt to receive maximal hospital benefit and will be discharged home.

 Dedicated discharge time 35 minutes.











History


Interval history: 





No new issues overnight.





Hospitalist Physical





- Constitutional


Vitals: 


                                        











Temp Pulse Resp BP Pulse Ox


 


 98.0 F   95 H  20   141/79   99 


 


 03/02/21 07:33  03/02/21 08:42  03/02/21 08:42  03/02/21 07:33  03/02/21 08:15











General appearance: Present: no acute distress, well-nourished





- EENT


Eyes: Present: PERRL, EOM intact


ENT: hearing intact, clear oral mucosa, dentition normal





- Neck


Neck: Present: supple, normal ROM





- Respiratory


Respiratory effort: normal


Respiratory: bilateral: CTA





- Cardiovascular


Rhythm: regular


Heart Sounds: Present: S1 & S2.  Absent: gallop, rub





- Extremities


Extremities: no ischemia, No edema, Full ROM





- Abdominal


General gastrointestinal: soft, non-tender, non-distended, normal bowel sounds





- Integumentary


Integumentary: Present: clear, warm, dry





- Neurologic


Neurologic: CNII-XII intact, moves all extremities





HEART Score





- HEART Score


Age: 45-65


Risk factors: 1-2 risk factors


Troponin: 


                                        











Troponin T  < 0.010 ng/mL (0.00-0.029)   02/25/21  16:29    











Troponin: < normal limit





- Critical Actions


Critical Actions: 0-3 pts:0.9-1.7%risk of adverse cardiac event.Candidate for 

discharge





Results





- Labs


CBC & Chem 7: 


                                 02/26/21 04:14





                                 03/01/21 07:46


Labs: 


                             Laboratory Last Values











WBC  5.9 K/mm3 (4.5-11.0)   02/26/21  04:14    


 


RBC  3.10 M/mm3 (3.65-5.03)  L  02/26/21  04:14    


 


Hgb  10.6 gm/dl (11.8-15.2)  L  02/26/21  04:14    


 


Hct  32.5 % (35.5-45.6)  L  02/26/21  04:14    


 


MCV  105 fl (84-94)  H  02/26/21  04:14    


 


MCH  34 pg (28-32)  H  02/26/21  04:14    


 


MCHC  33 % (32-34)   02/26/21  04:14    


 


RDW  14.8 % (13.2-15.2)   02/26/21  04:14    


 


Plt Count  244 K/mm3 (140-440)   02/26/21  04:14    


 


Lymph % (Auto)  4.9 % (13.4-35.0)  L  02/26/21  04:14    


 


Mono % (Auto)  6.6 % (0.0-7.3)   02/26/21  04:14    


 


Eos % (Auto)  0.2 % (0.0-4.3)   02/26/21  04:14    


 


Baso % (Auto)  0.3 % (0.0-1.8)   02/26/21  04:14    


 


Lymph # (Auto)  0.3 K/mm3 (1.2-5.4)  L  02/26/21  04:14    


 


Mono # (Auto)  0.4 K/mm3 (0.0-0.8)   02/26/21  04:14    


 


Eos # (Auto)  0.0 K/mm3 (0.0-0.4)   02/26/21  04:14    


 


Baso # (Auto)  0.0 K/mm3 (0.0-0.1)   02/26/21  04:14    


 


Seg Neutrophils %  88.0 % (40.0-70.0)  H  02/26/21  04:14    


 


Seg Neutrophils #  5.2 K/mm3 (1.8-7.7)   02/26/21  04:14    


 


PT  15.9 Sec. (12.2-14.9)  H  02/25/21  15:26    


 


INR  1.27  (0.87-1.13)  H  02/25/21  15:26    


 


APTT  33.4 Sec. (24.2-36.6)   02/25/21  15:26    


 


Sodium  136 mmol/L (137-145)  L  03/01/21  07:46    


 


Potassium  4.6 mmol/L (3.6-5.0)   03/01/21  07:46    


 


Chloride  92.0 mmol/L ()  L  03/01/21  07:46    


 


Carbon Dioxide  21 mmol/L (22-30)  L  03/01/21  07:46    


 


Anion Gap  28 mmol/L  03/01/21  07:46    


 


BUN  18 mg/dL (9-20)   03/01/21  07:46    


 


Creatinine  1.0 mg/dL (0.8-1.3)   03/01/21  07:46    


 


Estimated GFR  > 60 ml/min  03/01/21  07:46    


 


BUN/Creatinine Ratio  18 %  03/01/21  07:46    


 


Glucose  376 mg/dL ()  H  03/01/21  07:46    


 


POC Glucose  330 mg/dL ()  H  03/02/21  07:47    


 


Hemoglobin A1c  9.9 % (4-6)  H  02/26/21  04:14    


 


Calcium  9.3 mg/dL (8.4-10.2)   03/01/21  07:46    


 


Phosphorus  4.50 mg/dL (2.5-4.5)   02/26/21  17:34    


 


Magnesium  2.00 mg/dL (1.7-2.3)   02/26/21  17:34    


 


Iron  36 ug/dL ()  L  02/26/21  07:47    


 


TIBC  191 mcg/dL (250-450)  L  02/26/21  07:47    


 


% Saturation  18.85 %  02/26/21  07:47    


 


Transferrin  165 mg/dl (180-329)  L  02/26/21  07:47    


 


Total Bilirubin  0.80 mg/dL (0.1-1.2)   02/26/21  17:34    


 


Direct Bilirubin  0.5 mg/dL (0-0.2)  H  02/26/21  17:34    


 


Indirect Bilirubin  0.3 mg/dL  02/26/21  17:34    


 


AST  55 units/L (5-40)  H  02/26/21  17:34    


 


ALT  27 units/L (7-56)   02/26/21  17:34    


 


Alkaline Phosphatase  201 units/L ()  H  02/26/21  17:34    


 


Ammonia  39.0 umol/L (25-60)   02/26/21  17:34    


 


Troponin T  < 0.010 ng/mL (0.00-0.029)   02/25/21  16:29    


 


NT-Pro-B Natriuret Pep  137.0 pg/mL (0-450)   02/25/21  15:22    


 


Total Protein  6.4 g/dL (6.3-8.2)   02/26/21  17:34    


 


Albumin  3.6 g/dL (3.9-5)  L  02/26/21  17:34    


 


Albumin/Globulin Ratio  1.3 %  02/26/21  17:34    


 


Amylase  10 units/L ()  L  02/26/21  17:34    


 


Lipase  20 units/L (13-60)   02/26/21  17:34    


 


Vitamin B12  448.7 pg/mL (211-911)   02/26/21  07:48    


 


Procalcitonin  0.09 ng/mL (<0.15)   02/26/21  14:13    


 


TSH  2.590 mlU/mL (0.270-4.200)   02/25/21  16:55    


 


Urine Color  Heather  (Yellow)   02/26/21  02:22    


 


Urine Turbidity  Slightly-cloudy  (Clear)   02/26/21  02:22    


 


Urine pH  6.0  (5.0-7.0)   02/26/21  02:22    


 


Ur Specific Gravity  1.018  (1.003-1.030)   02/26/21  02:22    


 


Urine Protein  30 mg/dl mg/dL (Negative)   02/26/21  02:22    


 


Urine Glucose (UA)  >=500 mg/dL (Negative)   02/26/21  02:22    


 


Urine Ketones  20 mg/dL (Negative)   02/26/21  02:22    


 


Urine Blood  Lg  (Negative)   02/26/21  02:22    


 


Urine Nitrite  Neg  (Negative)   02/26/21  02:22    


 


Urine Bilirubin  Neg  (Negative)   02/26/21  02:22    


 


Urine Urobilinogen  2.0 mg/dL (<2.0)   02/26/21  02:22    


 


Ur Leukocyte Esterase  Sm  (Negative)   02/26/21  02:22    


 


Urine WBC (Auto)  70.0 /HPF (0.0-6.0)  H  02/26/21  02:22    


 


Urine RBC (Auto)  > 182.0 /HPF (0.0-6.0)   02/26/21  02:22    


 


U Epithel Cells (Auto)  < 1.0 /HPF (0-13.0)   02/26/21  02:22    


 


Urine Bacteria (Auto)  1+ /HPF (Negative)   02/26/21  02:22    


 


Hyaline Casts  2 /LPF  02/26/21  02:22    


 


Fluid Type  Paracentesis   03/01/21  12:24    


 


Fluid Color  Yellow   03/01/21  12:24    


 


Fluid Appearance  Hazy   03/01/21  12:24    


 


Fluid WBC  186 /mm3  03/01/21  12:24    


 


Fluid RBC  346 /mm3  03/01/21  12:24    


 


Fluid Seg Neutrophils  40.0 %  03/01/21  12:24    


 


Fluid Lymphocytes  47.0 %  03/01/21  12:24    


 


Fluid Monocytes  13.0 %  03/01/21  12:24    


 


Fluid Comment  See add'l   03/01/21  12:24    














- Diagnostic Impressions


Diagnostic Impressions: 








Echocardiogram  02/26/21 07:33


Transthoracic Echocardiogram


 


Indication:


Pulmonary HTN


BP:           135/75


HR:           113


 


Conclusions


 *The study quality is technically difficult. 


 *Global left ventricular systolic function is normal.


 *There is no left ventricular hypertrophy.


 *The estimated ejection fraction is 55-60%. 


 *The right ventricular global systolic function is normal.


 *There is trace of mitral regurgitation.


 *There is trace tricuspid regurgitation. 


 *There is no evidence of aortic regurgitation.


 *There is trace tricuspid regurgitation. 


 *The right ventricular systolic pressure is calculated at 22 mmHg. 


 *There is no evidence of pulmonic regurgitation.


 *There is a moderate pleural effusion. 


 *There is no pericardial effusion.


 


Findings     


Procedure Info:


The study quality is technically difficult.  The study is technically


limited due to patient body habitus.  


 


Left Ventricle:


The left ventricular chamber size is normal. There is no left


ventricular hypertrophy. Global left ventricular wall motion and


contractility are within normal limits. Global left ventricular systolic


function is normal. The estimated ejection fraction is 55-60%.  Abnormal


left ventricular diastolic function is observed.Indeterminate 


 


Left Atrium:


The left atrial chamber size is normal. 


 


Right Ventricle:


The right ventricular cavity size is normal. The right ventricular


global systolic function is normal. 


 


Right Atrium:


The right atrial cavity size is normal. 


 


Aortic Valve:


The aortic valve is not well visualized. There is no evidence of aortic


regurgitation. There is no evidence of aortic stenosis. 


 


Mitral Valve:


The mitral valve leaflets do not appear thickened. There is trace of


mitral regurgitation. 


 


Tricuspid Valve:


The tricuspid valve leaflets are normal.  There is trace tricuspid


regurgitation.  The right ventricular systolic pressure is calculated at


22 mmHg.  


 


Pulmonic Valve:


There is no evidence of pulmonic valve thickening. There is no evidence


of pulmonic regurgitation. 


 


Pericardium:


There is no pericardial effusion. There is a moderate pleural effusion. 


 


 


Aorta:


The aorta appears normal.  


 


Venous:


The inferior vena cava is not visualized. 


 


Contrast:


Intravenous contrast was used to enhance endocardial border definition.


 


 


Measurements     


Chambers 2D


Name                    Value         Normal Range            


IVSd (2D)               1.01 cm       (0.6 - 1.1)             


LVPWd (2D)              1.01 cm       (0.6 - 1.1)             


LVIDd (2D)              5.17 cm       (3.7 - 5.6)             


LVIDs (2D)              3.61 cm       (2 - 3.8)               


LV FS (2D)              30.2 %        -                        


EF Teichholz (2D)       57.16 %       -                        


Ao root diameter (2D)   3.24 cm       (2 - 3.7)               


 


Volumes/Mass


Name                    Value         Normal Range            


LA ESV SP 4CH (A/L)     61.61 ml      -                        


LA ESV SP 2CH (A/L)     54.93 ml      -                        


LA ESV BP (A/L)         58.99 ml      -                        


LA ESV BP (A/L) index   21.29 ml/m2   -                        


LA ESV SP 4CH (MOD)     57.96 ml      -                        


LA ESV SP 2CH (MOD)     53.09 ml      -                        


LA ESV BP (MOD)         56.17 ml      -                        


LA ESV BP (MOD) index   20.28 ml/m2   -                        


 


Aortic Valve


Name                    Value         Normal Range            


AV Vmax                 2.07 m/sec    -                        


AV VTI                  39.21 cm      -                        


AV peak gradient        17.18 mmHg    -                        


AV mean gradient        9.74 mmHg     -                        


LVOT diameter           2.08 cm       -                        


LVOT Vmax               1.73 m/sec    -                        


LVOT VTI                30.05 cm      -                        


LVOT peak gradient      11.91 mmHg    -                        


LVOT mean gradient      6.05 mmHg     -                        


SV LVOT                 101.88 ml     -                        


KARIS (continuity Vmax)   2.82 cm2      -                        


KARIS (continuity VTI)    2.6 cm2       -                        


Ascending Ao            3.06 cm       -                        


 


Mitral Valve


Name                    Value         Normal Range            


MV PHT                  43.16 msec    -                        


MVA (PHT)               5.1 cm2       -                        


 


Tricuspid Valve


Name                    Value         Normal Range            


TR Vmax                 1.87 m/sec    -                        


TR peak gradient        14 mmHg       -                        


RAP                     8 mmHg        -                        


RVSP                    22 mmHg       -                        


 


Pulmonic Valve/Qp:Qs


Name                    Value         Normal Range            


PV Vmax                 1.77 m/sec    -                        


PV peak gradient        12.54 mmHg    -                        


PV acceleration time    72.31 msec    -                        


 


Electronically signed by: Herbert Del Rio MD on 02/26/2021 11:15:28











Valdovinos/IV: 


                                        





Voiding Method                   Indwelling Catheter











Active Medications





- Current Medications


Current Medications: 














Generic Name Dose Route Start Last Admin





  Trade Name Freq  PRN Reason Stop Dose Admin


 


Acetaminophen  650 mg  02/25/21 23:06 





  Acetaminophen 325 Mg Tab  PO  





  Q4H PRN  





  Pain MILD(1-3)/Fever >100.5/HA  


 


Albuterol  2.5 mg  02/25/21 23:30 





  Albuterol 2.5 Mg/3 Ml Nebu  IH  





  Q3HRT PRN  





  Wheezing/ SOB  


 


Albuterol/Ipratropium  1 ampul  02/26/21 22:00  03/02/21 08:14





  Ipratropium/Albuterol Sulfate 3 Ml Ampul.Neb  IH   1 ampul





  BID MING   Administration


 


Amlodipine Besylate  5 mg  02/26/21 10:00  03/01/21 10:58





  Amlodipine 5 Mg Tab  PO   5 mg





  QDAY MING   Administration


 


Arformoterol Tartrate  15 mcg  02/26/21 20:00  03/02/21 08:14





  Arformoterol 15 Mcg/2 Ml Nebu  IH   15 mcg





  Q12HRT MING   Administration


 


Aspirin  325 mg  02/26/21 10:00  03/01/21 10:58





  Aspirin 325 Mg Tab  PO   325 mg





  QDAY MING   Administration


 


Benzonatate  100 mg  02/26/21 06:00  03/02/21 07:04





  Benzonatate 100 Mg Cap  PO   100 mg





  Q8HR MING   Administration


 


Budesonide  0.5 mg  02/26/21 20:00  03/02/21 08:14





  Budesonide 0.5 Mg/2 Ml Nebu  IH   0.5 mg





  Q12HRT MING   Administration


 


Chlordiazepoxide HCl  50 mg  02/26/21 16:27 





  Chlordiazepoxide 25 Mg Cap  PO  





  Q1HR PRN  





  CIWA-Ar 8-15  


 


Chlordiazepoxide HCl  100 mg  02/26/21 16:27 





  Chlordiazepoxide 25 Mg Cap  PO  





  Q1HR PRN  





  CIWA-Ar 16-25  


 


Dextrose  50 ml  02/26/21 10:00 





  Dextrose 50% In Water (25gm) 50 Ml Syringe  IV  





  Q30MIN PRN  





  Hypoglycemia  





  Protocol  


 


Famotidine  20 mg  02/26/21 10:00  03/01/21 23:15





  Famotidine 20 Mg Tab  PO   20 mg





  BID MING   Administration


 


Fluticasone Propionate  50 mcg  02/26/21 10:00  03/01/21 10:39





  Fluticasone Propionate Nasal Spray 16 Gm  NS   50 mcg





  QDAY MING   Administration


 


Furosemide  40 mg  02/26/21 06:00  03/02/21 07:04





  Furosemide 40 Mg/4 Ml Inj  IV   40 mg





  0600,1800 MING   Administration


 


Heparin Sodium (Porcine)  5,000 unit  02/25/21 23:15  03/01/21 23:15





  Heparin 5,000 Unit/1 Ml Vial  SUB-Q   5,000 unit





  Q12HR MING   Administration


 


Hydromorphone HCl  0.5 mg  02/25/21 23:06 





  Hydromorphone 1 Mg/1 Ml Inj  IV  





  Q3H PRN  





  Pain , Severe (7-10)  


 


Levofloxacin/Dextrose  750 mg in 150 mls @ 100 mls/hr  02/26/21 10:00  03/01/21 

10:46





  Levaquin 750mg/150ml  IV  03/02/21 11:29  100 mls/hr





  Q24HR MING   Administration





  Protocol  


 


Insulin Human Isoph/Insulin Regular  15 unit  02/28/21 17:00  03/01/21 18:26





  Insulin Nph/Regular 70/30 Inj  SUB-Q   15 unit





  BIDDIAB MING   Administration


 


Insulin Human Lispro  0 unit  02/26/21 11:30  03/01/21 23:25





  Insulin Lispro 100 Unit/Ml  SUB-Q   6 unit





  ACHS MING   Administration





  Protocol  


 


Lisinopril  10 mg  02/26/21 10:00  03/01/21 11:28





  Lisinopril 10 Mg Tab  PO   10 mg





  QDAY MING   Administration


 


Methylprednisolone Sodium Succinate  40 mg  02/26/21 22:00  03/01/21 23:15





  Methylprednisolone Sod Succinate 40 Mg/1 Ml Inj  IV   40 mg





  BID MING   Administration


 


Multivit/Ca Carb/B Cmplx/FA/Prenat  1 cap  02/27/21 10:00  03/01/21 10:58





  Folic Acid/Vit B Comp W-C 1 Mg (Renal Caps)  PO   1 cap





  QDAY MING   Administration


 


Multivitamins  1 each  02/27/21 10:00  03/01/21 11:27





  Multivitamins ,Therapeutic Tab  PO   1 each





  QDAY MING   Administration


 


Nicotine  21 mg  02/26/21 10:00  03/01/21 11:00





  Nicotine 21 Mg/24 Hr Patch  TD   Not Given





  QDAY MING  


 


Ondansetron HCl  4 mg  02/25/21 23:06 





  Ondansetron 4 Mg/2 Ml Inj  IV  





  Q8H PRN  





  Nausea And Vomiting  


 


Oxycodone/Acetaminophen  1 tab  02/25/21 23:06 





  Oxycodone /Acetaminophen 5-325mg Tab  PO  





  Q6H PRN  





  Pain, Moderate (4-6)  


 


Pravastatin Sodium  10 mg  02/26/21 22:00  03/01/21 23:15





  Pravastatin 20 Mg Tab  PO   10 mg





  QHS MING   Administration


 


Sodium Chloride  10 ml  02/26/21 10:00  03/01/21 23:15





  Sodium Chloride 0.9% 10 Ml Flush Syringe  IV   10 ml





  BID MING   Administration


 


Sodium Chloride  10 ml  02/25/21 23:06  02/26/21 05:40





  Sodium Chloride 0.9% 10 Ml Flush Syringe  IV   10 ml





  PRN PRN   Administration





  LINE FLUSH  


 


Thiamine HCl  100 mg  02/27/21 10:00  03/01/21 10:58





  Thiamine 100 Mg Tab  PO   100 mg





  QDAY MING   Administration














Nutrition/Malnutrition Assess





- Dietary Evaluation


Nutrition/Malnutrition Findings: 


                                        





Nutrition Notes                                            Start:  02/26/21 

11:38


Freq:                                                      Status: Active       




Protocol:                                                                       




 Document     03/01/21 08:54    (Rec: 03/01/21 09:03    [id number])


 Nutrition Notes


     Initial or Follow up                        Reassessment


     Current Diagnosis                           COPD,Coronary Artery Disease,


                                                 Hypertension


     Other Pertinent Diagnosis                   Anemia


     Current Diet                                Cardiac/Consistent


                                                 Carbohydrate


     Labs/Tests                                  


                                                 Na 136


     Pertinent Medications                       Lasix 40 mg


                                                 Humulin 15 units


                                                 Humalog 8 units


                                                 Levaquin in 150 ml Dextrose at


                                                 100 ml/h


                                                 Renal Caps


     Height                                      5 ft 9 in


     Weight                                      115.6 kg


     Ideal Body Weight (kg)                      72.72


     BMI                                         37.6


     Weight change and time frame                weight change noted. Likely


                                                 related to bedscale error.


     Weight Status                               Morbidly Obese


     Subjective/Other Information                F/U for PO intakes. Pt reports


                                                 returning appetite and PO


                                                 intake of 60% of breakfast. Pt


                                                 states understanding of diet


                                                 education provided on 2/26/


                                                 2021 and denies having


                                                 questions. Weight change


                                                 likely d/t error. Will


                                                 continue to monitor. Will


                                                 start ONS d/t low PO intake


     Percent of energy/protein needs met:        41%/51%


     Burn                                        Absent


     Trauma                                      Absent


     GI Symptoms                                 None


     Food Allergy                                No


     Current % PO                                Fair (50-74%)


     Minimum of two criteria                     No


     Fluid Accumulation                          Mild (non-severe)


     #2


      Nutrition Diagnosis                        Food and nutrition-related


                                                 knowledge deficit


      As Evidenced by Signs and Symptoms         Understand education provided


                                                 previously


      Diagnosis Progress(for reassessment        Resolved


       documentation)                            


     #1


      Nutrition Diagnosis                        Inadequate oral intake


      As Evidenced by Signs and Symptoms         Pt reports intake of 60% of


                                                 breakfast


      Diagnosis Progress(for reassessment        Improved


       documentation)                            


     Is patient on ventilator?                   No


     Is Patient Ambulatory and/or Out of Bed     Yes


     REE-([place]-ambulatory/OOB) [     2640.794


      [email or web address]]                               


     Kcal/Kg value to use for calculation        25


     Approximate Energy Requirements Using       2890


      kcal/Kg                                    


     Additional Notes                            protein needs:102 - 127g (0.8


                                                 - 1g/kgAdjBW 127.08kg)


                                                 fluid needs: 1 ml/kcal


 Nutrition Intervention


     Change Diet Order:                          Continue cardiac consistent


                                                 carbohydrate diet


     Add Supplement/Snack (indicate name/kcal    Glucerna BID


      /protein )                                 


     Provides kCal:                              440


     Provides Protein (gm)                       20


     Goal #1                                     PO intake that meets at least


                                                 75% of kcal and protein needs


     Anticipated Discharge Needs:                Cardiac/Consistent


                                                 Carbohydrate Diet


     Follow-Up By:                               03/03/21


     Additional Comments                         F/U PO intakes, ONS tolerance,


                                                 and weight stabilization

## 2021-03-02 NOTE — GASTROENTEROLOGY PROGRESS NOTE
Assessment and Plan


GI: pt multiple medical problems, noted alcohol abuse now with abdominal 

swelling with ct scan raising possible cirrhosis and moderate ascites


- labs show slight increase coags, nl platelets, slight decrease albumin, not 

strongly but may be consistent with cirrhos


- LVP w/ 3.4L removed, study resukts pending


- liver related serologies benign


- diet as tolerated


- from GI/liver standpoint most of evaluation and management can be done as 

outpt especially if get lvp today


- will follow








Subjective


Date of service: 03/02/21


Principal diagnosis: Abdominal pain, alcohol abuse, abdominal distention.


Interval history: 


reports abdominal pain improved especially after paracenthesis








Objective





- Constitutional


Vitals: 


                                        











Temp Pulse Resp BP Pulse Ox


 


 97.8 F   109 H  18   107/62   98 


 


 03/02/21 15:16  03/02/21 19:55  03/02/21 19:55  03/02/21 19:12  03/02/21 19:56











General appearance: no acute distress





- EENT


Eyes: PERRL





- Respiratory


Respiratory: bilateral: CTA





- Cardiovascular


Rhythm: regular


Heart Sounds: Present: S1 & S2





- Gastrointestinal


General gastrointestinal: Present: soft, non-tender, non-distended





- Labs


CBC & Chem 7: 


                                 02/26/21 04:14





                                 03/01/21 07:46


Labs: 


                         Laboratory Results - last 24 hr











  02/26/21 03/01/21 03/02/21





  04:14 21:14 07:47


 


ABG pH   


 


POC ABG pCO2   


 


POC ABG pO2   


 


POC ABG HCO3   


 


POC ABG Base Excess   


 


ABG Hemoglobin   


 


ABG Oxyhemoglobin   


 


ABG Methemoglobin   


 


ABG Sodium   


 


ABG Potassium   


 


ABG Chloride   


 


ABG Glucose   


 


Carboxyhemoglobin   


 


FiO2   


 


POC Glucose   337 H  330 H


 


RBC Folic Acid  784  


 


Arterial Blood Glucose   


 


Arterial Blood Ionized Calcium   














  03/02/21 03/02/21 03/02/21





  10:54 11:12 15:37


 


ABG pH  7.417  


 


POC ABG pCO2  36.3  


 


POC ABG pO2  84.1  


 


POC ABG HCO3  22.9  


 


POC ABG Base Excess  -1.2  


 


ABG Hemoglobin  13.1  


 


ABG Oxyhemoglobin  94.1  


 


ABG Methemoglobin  0.3  


 


ABG Sodium  136.4  


 


ABG Potassium  4.1  


 


ABG Chloride  92.0 L  


 


ABG Glucose  442 H  


 


Carboxyhemoglobin  1.2  


 


FiO2  21  


 


POC Glucose   397 H  448 H


 


RBC Folic Acid   


 


Arterial Blood Glucose  442 H  


 


Arterial Blood Ionized Calcium  4.9  














  03/02/21





  17:21


 


ABG pH 


 


POC ABG pCO2 


 


POC ABG pO2 


 


POC ABG HCO3 


 


POC ABG Base Excess 


 


ABG Hemoglobin 


 


ABG Oxyhemoglobin 


 


ABG Methemoglobin 


 


ABG Sodium 


 


ABG Potassium 


 


ABG Chloride 


 


ABG Glucose 


 


Carboxyhemoglobin 


 


FiO2 


 


POC Glucose  329 H


 


RBC Folic Acid 


 


Arterial Blood Glucose 


 


Arterial Blood Ionized Calcium

## 2021-03-03 RX ADMIN — Medication SCH MG: at 09:31

## 2021-03-03 RX ADMIN — INSULIN LISPRO SCH UNIT: 100 INJECTION, SOLUTION INTRAVENOUS; SUBCUTANEOUS at 17:35

## 2021-03-03 RX ADMIN — NICOTINE SCH MG: 21 PATCH TRANSDERMAL at 09:31

## 2021-03-03 RX ADMIN — IPRATROPIUM BROMIDE AND ALBUTEROL SULFATE SCH AMPUL: .5; 3 SOLUTION RESPIRATORY (INHALATION) at 21:35

## 2021-03-03 RX ADMIN — BENZONATATE SCH MG: 100 CAPSULE ORAL at 21:42

## 2021-03-03 RX ADMIN — Medication SCH ML: at 09:33

## 2021-03-03 RX ADMIN — MULTIVITAMIN TABLET SCH EACH: TABLET at 09:31

## 2021-03-03 RX ADMIN — BENZONATATE SCH MG: 100 CAPSULE ORAL at 13:39

## 2021-03-03 RX ADMIN — NEPHROCAP SCH CAP: 1 CAP ORAL at 09:31

## 2021-03-03 RX ADMIN — FUROSEMIDE SCH MG: 40 TABLET ORAL at 17:36

## 2021-03-03 RX ADMIN — ASPIRIN SCH MG: 325 TABLET ORAL at 09:31

## 2021-03-03 RX ADMIN — HEPARIN SODIUM SCH UNIT: 5000 INJECTION, SOLUTION INTRAVENOUS; SUBCUTANEOUS at 21:37

## 2021-03-03 RX ADMIN — INSULIN HUMAN SCH UNIT: 100 INJECTION, SUSPENSION SUBCUTANEOUS at 17:35

## 2021-03-03 RX ADMIN — Medication SCH ML: at 21:42

## 2021-03-03 RX ADMIN — INSULIN HUMAN SCH UNIT: 100 INJECTION, SUSPENSION SUBCUTANEOUS at 09:30

## 2021-03-03 RX ADMIN — FAMOTIDINE SCH MG: 20 TABLET ORAL at 21:42

## 2021-03-03 RX ADMIN — INSULIN LISPRO SCH UNIT: 100 INJECTION, SOLUTION INTRAVENOUS; SUBCUTANEOUS at 12:30

## 2021-03-03 RX ADMIN — Medication SCH: at 08:29

## 2021-03-03 RX ADMIN — INSULIN LISPRO SCH UNIT: 100 INJECTION, SOLUTION INTRAVENOUS; SUBCUTANEOUS at 21:37

## 2021-03-03 RX ADMIN — ARFORMOTEROL TARTRATE SCH: 15 SOLUTION RESPIRATORY (INHALATION) at 21:37

## 2021-03-03 RX ADMIN — PRAVASTATIN SODIUM SCH MG: 20 TABLET ORAL at 21:42

## 2021-03-03 RX ADMIN — HEPARIN SODIUM SCH UNIT: 5000 INJECTION, SOLUTION INTRAVENOUS; SUBCUTANEOUS at 09:31

## 2021-03-03 RX ADMIN — ARFORMOTEROL TARTRATE SCH MCG: 15 SOLUTION RESPIRATORY (INHALATION) at 09:22

## 2021-03-03 RX ADMIN — FLUTICASONE PROPIONATE SCH MCG: 50 SPRAY, METERED NASAL at 09:34

## 2021-03-03 RX ADMIN — LISINOPRIL SCH MG: 10 TABLET ORAL at 09:31

## 2021-03-03 RX ADMIN — BENZONATATE SCH MG: 100 CAPSULE ORAL at 05:50

## 2021-03-03 RX ADMIN — BUDESONIDE SCH MG: 0.5 INHALANT RESPIRATORY (INHALATION) at 09:22

## 2021-03-03 RX ADMIN — INSULIN LISPRO SCH UNIT: 100 INJECTION, SOLUTION INTRAVENOUS; SUBCUTANEOUS at 09:30

## 2021-03-03 RX ADMIN — BUDESONIDE SCH MG: 0.5 INHALANT RESPIRATORY (INHALATION) at 21:36

## 2021-03-03 RX ADMIN — FUROSEMIDE SCH MG: 40 TABLET ORAL at 05:50

## 2021-03-03 RX ADMIN — FAMOTIDINE SCH MG: 20 TABLET ORAL at 09:31

## 2021-03-03 RX ADMIN — IPRATROPIUM BROMIDE AND ALBUTEROL SULFATE SCH AMPUL: .5; 3 SOLUTION RESPIRATORY (INHALATION) at 09:03

## 2021-03-03 NOTE — GASTROENTEROLOGY PROGRESS NOTE
Assessment and Plan


GI: pt multiple medical problems, noted alcohol abuse now with abdominal 

swelling with ct scan raising possible cirrhosis and moderate ascites


- labs show slight increase coags, nl platelets, slight decrease albumin, not 

strongly but may be consistent with cirrhos


- LVP w/ 3.4L removed, study results pending


- liver related serologies benign


- diet as tolerated


- from GI/liver standpoint most of evaluation and management can be done as 

outpt especially if get lvp today


- will follow








Subjective


Date of service: 03/03/21


Principal diagnosis: Abdominal pain, alcohol abuse, abdominal distention.


Interval history: 


- reports feeling better, wants to go home when BS stable








Objective





- Constitutional


Vitals: 


                                        











Temp Pulse Resp BP Pulse Ox


 


 97.6 F   90   18   132/73   96 


 


 03/03/21 12:03  03/03/21 12:03  03/03/21 12:03  03/03/21 12:03  03/03/21 12:03











General appearance: no acute distress





- EENT


Eyes: PERRL





- Respiratory


Respiratory: bilateral: CTA





- Cardiovascular


Rhythm: regular


Heart Sounds: Present: S1 & S2





- Gastrointestinal


General gastrointestinal: Present: soft, non-tender, non-distended





- Labs


CBC & Chem 7: 


                                 02/26/21 04:14





                                 03/01/21 07:46


Labs: 


                         Laboratory Results - last 24 hr











  03/02/21 03/02/21 03/02/21





  15:37 17:21 21:06


 


POC Glucose  448 H  329 H  179 H














  03/03/21 03/03/21 03/03/21





  08:07 11:39 13:07


 


POC Glucose  339 H  361 H  459 H














  03/03/21





  15:02


 


POC Glucose  432 H

## 2021-03-03 NOTE — PROGRESS NOTE
Assessment and Plan





atient alert, awake resting on room air. O2 saturation 98%. No complaint of 

chest pain, shortness of breath or cough at this time.No acute respiratory 

distress. patient afebrile. has no leukocytosis. Chest xray done 2/25/21 

reported No acute findings.  Patient has history of smoking 1 pack x 25 years. 

Stopped smoking 1 year ago. Drinks alcohol. Denies drug abuse. Patient works as 

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 

and has history of Sleep apnea and uses CPAP at home.  BIPAP standby in the r

oom. Patient he is admitted for swelling of body, legs and abdomen. Patients 

swelling some what better now.


Patient presently on albuterol/atrovent aerosol treatments, PO Prednisone. 

levaquin, S/C Heparin and famotidine.





- Patient Problems


(1) COPD with exacerbation


Current Visit: Yes   Status: Acute   


Plan to address problem: 


O2 2 litres via nasal canula as needed for shortness of breath.


 Albuterol/atrovent aerosol treatments q 6 hours.


 PO Prednisone


 S/C Heparin.


 Famotidine.


 I/V Levaquin.








(2) Anasarca


Current Visit: Yes   Status: Acute   


Plan to address problem: 


Patient is on lasix.


 Management as per primary care.








(3) Dyspnea


Current Visit: Yes   Status: Acute   


Qualifiers: 


   Dyspnea type: shortness of breath   Qualified Code(s): R06.02 - Shortness of 

breath; R06.00 - Dyspnea, unspecified; R06.01 - Orthopnea   


Plan to address problem: 


O2 2 litres via nasal canula as needed for shortness of breath.


 Albuterol/atrovent aerosol treatments q 6 hours.


 PO Prednisone.


 S/C Heparin.


 Famotidine.


 I/V Levaquin


I/V Lasix.


PFTs as out patient.








(4) Hypoalbuminemia


Current Visit: Yes   Status: Acute   


Plan to address problem: 


Management as per primary care.








(5) Pulmonary hypertension


Current Visit: Yes   Status: Acute   


Plan to address problem: 


Optimize the treatment for COPD and Sleep apnea.








(6) Morbid obesity with BMI of 40.0-44.9, adult


Current Visit: Yes   Status: Chronic   


Plan to address problem: 


Recommend to loose weight.


 Exercise and diet.


 Use CPAP as he is using at night.








(7) HTN (hypertension)


Current Visit: No   Status: Chronic   


Plan to address problem: 


Management as per primary care.








(8) Tobacco use


Current Visit: No   Status: Chronic   


Plan to address problem: 


Patient said he stopped smoking 1 year ago.








Subjective


Date of service: 03/03/21


Principal diagnosis: Abdominal pain, alcohol abuse, abdominal distention.


Interval history: 





Patient alert, awake resting on room air. O2 saturation 98%. No complaint of 

chest pain, shortness of breath or cough at this time.No acute respiratory 

distress. patient afebrile. has no leukocytosis. Chest xray done 2/25/21 

reported No acute findings.  Patient has history of smoking 1 pack x 25 years. 

Stopped smoking 1 year ago. Drinks alcohol. Denies drug abuse. Patient works as 

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 

and has history of Sleep apnea and uses CPAP at home.  BIPAP standby in the 

room. Patient he is admitted for swelling of body, legs and abdomen. Patients 

swelling some what better now.


Patient presently on albuterol/atrovent aerosol treatments, PO Prednisone. 

levaquin, S/C Heparin and famotidine.





Objective


                               Vital Signs - 12hr











  03/03/21 03/03/21 03/03/21





  03:56 07:29 09:24


 


Temperature 98.4 F 97.5 F L 


 


Pulse Rate 88 97 H 


 


Pulse Rate [   110 H





Anterior   





Bilateral   





Throughout]   


 


Respiratory 18 16 





Rate   


 


Respiratory   18





Rate [Anterior   





Bilateral   





Throughout]   


 


Blood Pressure 126/75 128/77 


 


Blood Pressure   





[Right]   


 


O2 Sat by Pulse 96 97 





Oximetry   














  03/03/21 03/03/21





  12:00 12:03


 


Temperature  97.6 F


 


Pulse Rate 90 90


 


Pulse Rate [  





Anterior  





Bilateral  





Throughout]  


 


Respiratory  18





Rate  


 


Respiratory  





Rate [Anterior  





Bilateral  





Throughout]  


 


Blood Pressure  


 


Blood Pressure  132/73





[Right]  


 


O2 Sat by Pulse  96





Oximetry  











Constitutional: no acute distress, alert


Eyes: non-icteric


Neck: supple, no lymphadenopathy


Effort: normal


Ascultation: Bilateral: diminished breath sounds, other (Prolonged expiratory 

phase.)


Cardiovascular: regular rate and rhythm


Gastrointestinal: normoactive bowel sounds, soft, non-tender


Integumentary: normal


Extremities: edema


Neurologic: normal mental status, non-focal exam, pupils equal and round, CN II-

XII normal


Psychiatric: mood appropriate


CBC and BMP: 


                                 02/26/21 04:14





                                 03/01/21 07:46


ABG, PT/INR, D-dimer: 


ABG











ABG pH  7.417  (7.320-7.450)   03/02/21  10:54    


 


POC ABG pCO2  36.3 mmHg (32.0-48.0)   03/02/21  10:54    


 


POC ABG pO2  84.1 mmHg ()   03/02/21  10:54    


 


POC ABG HCO3  22.9   03/02/21  10:54    





PT/INR, D-dimer











PT  15.9 Sec. (12.2-14.9)  H  02/25/21  15:26    


 


INR  1.27  (0.87-1.13)  H  02/25/21  15:26    








Abnormal lab findings: 


                                  Abnormal Labs











  02/25/21 02/25/21 02/25/21





  15:22 15:26 15:26


 


RBC  3.09 L  


 


Hgb  10.5 L  


 


Hct  31.9 L  


 


MCV  103 H  


 


MCH  34 H  


 


Lymph % (Auto)  8.1 L  


 


Mono % (Auto)  14.1 H  


 


Lymph # (Auto)  0.5 L  


 


Mono # (Auto)  0.9 H  


 


Seg Neutrophils %  76.7 H  


 


PT   15.9 H 


 


INR   1.27 H 


 


ABG Chloride   


 


ABG Glucose   


 


Sodium    130 L


 


Potassium   


 


Chloride    88.8 L


 


Carbon Dioxide   


 


BUN    6 L


 


Creatinine   


 


Glucose    334 H


 


POC Glucose   


 


Hemoglobin A1c   


 


Iron   


 


TIBC   


 


Transferrin   


 


Direct Bilirubin   


 


AST    50 H


 


Alkaline Phosphatase    214 H


 


Albumin    3.2 L


 


Amylase   


 


Arterial Blood Glucose   


 


Urine WBC (Auto)   














  02/26/21 02/26/21 02/26/21





  02:22 04:14 04:14


 


RBC   3.10 L 


 


Hgb   10.6 L 


 


Hct   32.5 L 


 


MCV   105 H 


 


MCH   34 H 


 


Lymph % (Auto)   4.9 L 


 


Mono % (Auto)   


 


Lymph # (Auto)   0.3 L 


 


Mono # (Auto)   


 


Seg Neutrophils %   88.0 H 


 


PT   


 


INR   


 


ABG Chloride   


 


ABG Glucose   


 


Sodium    132 L


 


Potassium   


 


Chloride    91.0 L


 


Carbon Dioxide   


 


BUN    5 L


 


Creatinine    0.6 L


 


Glucose    236 H


 


POC Glucose   


 


Hemoglobin A1c   


 


Iron   


 


TIBC   


 


Transferrin   


 


Direct Bilirubin   


 


AST    45 H


 


Alkaline Phosphatase    203 H


 


Albumin    3.3 L


 


Amylase   


 


Arterial Blood Glucose   


 


Urine WBC (Auto)  70.0 H  














  02/26/21 02/26/21 02/26/21





  04:14 07:47 13:32


 


RBC   


 


Hgb   


 


Hct   


 


MCV   


 


MCH   


 


Lymph % (Auto)   


 


Mono % (Auto)   


 


Lymph # (Auto)   


 


Mono # (Auto)   


 


Seg Neutrophils %   


 


PT   


 


INR   


 


ABG Chloride   


 


ABG Glucose   


 


Sodium   


 


Potassium   


 


Chloride   


 


Carbon Dioxide   


 


BUN   


 


Creatinine   


 


Glucose   


 


POC Glucose    305 H


 


Hemoglobin A1c  9.9 H  


 


Iron   36 L 


 


TIBC   191 L 


 


Transferrin   165 L 


 


Direct Bilirubin   


 


AST   


 


Alkaline Phosphatase   


 


Albumin   


 


Amylase   


 


Arterial Blood Glucose   


 


Urine WBC (Auto)   














  02/26/21 02/26/21 02/27/21





  17:34 22:06 04:26


 


RBC   


 


Hgb   


 


Hct   


 


MCV   


 


MCH   


 


Lymph % (Auto)   


 


Mono % (Auto)   


 


Lymph # (Auto)   


 


Mono # (Auto)   


 


Seg Neutrophils %   


 


PT   


 


INR   


 


ABG Chloride   


 


ABG Glucose   


 


Sodium    134 L


 


Potassium    5.4 H D


 


Chloride    91.6 L


 


Carbon Dioxide    18 L


 


BUN   


 


Creatinine   


 


Glucose    360 H


 


POC Glucose   362 H 


 


Hemoglobin A1c   


 


Iron   


 


TIBC   


 


Transferrin   


 


Direct Bilirubin  0.5 H  


 


AST  55 H  


 


Alkaline Phosphatase  201 H  


 


Albumin  3.6 L  


 


Amylase  10 L  


 


Arterial Blood Glucose   


 


Urine WBC (Auto)   














  02/27/21 02/27/21 02/27/21





  08:12 12:27 16:29


 


RBC   


 


Hgb   


 


Hct   


 


MCV   


 


MCH   


 


Lymph % (Auto)   


 


Mono % (Auto)   


 


Lymph # (Auto)   


 


Mono # (Auto)   


 


Seg Neutrophils %   


 


PT   


 


INR   


 


ABG Chloride   


 


ABG Glucose   


 


Sodium   


 


Potassium   


 


Chloride   


 


Carbon Dioxide   


 


BUN   


 


Creatinine   


 


Glucose   


 


POC Glucose  385 H  335 H  294 H


 


Hemoglobin A1c   


 


Iron   


 


TIBC   


 


Transferrin   


 


Direct Bilirubin   


 


AST   


 


Alkaline Phosphatase   


 


Albumin   


 


Amylase   


 


Arterial Blood Glucose   


 


Urine WBC (Auto)   














  02/27/21 02/28/21 02/28/21





  21:19 07:34 12:08


 


RBC   


 


Hgb   


 


Hct   


 


MCV   


 


MCH   


 


Lymph % (Auto)   


 


Mono % (Auto)   


 


Lymph # (Auto)   


 


Mono # (Auto)   


 


Seg Neutrophils %   


 


PT   


 


INR   


 


ABG Chloride   


 


ABG Glucose   


 


Sodium   


 


Potassium   


 


Chloride   


 


Carbon Dioxide   


 


BUN   


 


Creatinine   


 


Glucose   


 


POC Glucose  351 H  356 H  344 H


 


Hemoglobin A1c   


 


Iron   


 


TIBC   


 


Transferrin   


 


Direct Bilirubin   


 


AST   


 


Alkaline Phosphatase   


 


Albumin   


 


Amylase   


 


Arterial Blood Glucose   


 


Urine WBC (Auto)   














  02/28/21 02/28/21 03/01/21





  16:37 21:17 07:26


 


RBC   


 


Hgb   


 


Hct   


 


MCV   


 


MCH   


 


Lymph % (Auto)   


 


Mono % (Auto)   


 


Lymph # (Auto)   


 


Mono # (Auto)   


 


Seg Neutrophils %   


 


PT   


 


INR   


 


ABG Chloride   


 


ABG Glucose   


 


Sodium   


 


Potassium   


 


Chloride   


 


Carbon Dioxide   


 


BUN   


 


Creatinine   


 


Glucose   


 


POC Glucose  371 H  357 H  347 H


 


Hemoglobin A1c   


 


Iron   


 


TIBC   


 


Transferrin   


 


Direct Bilirubin   


 


AST   


 


Alkaline Phosphatase   


 


Albumin   


 


Amylase   


 


Arterial Blood Glucose   


 


Urine WBC (Auto)   














  03/01/21 03/01/21 03/01/21





  07:46 10:54 15:42


 


RBC   


 


Hgb   


 


Hct   


 


MCV   


 


MCH   


 


Lymph % (Auto)   


 


Mono % (Auto)   


 


Lymph # (Auto)   


 


Mono # (Auto)   


 


Seg Neutrophils %   


 


PT   


 


INR   


 


ABG Chloride   


 


ABG Glucose   


 


Sodium  136 L  


 


Potassium   


 


Chloride  92.0 L  


 


Carbon Dioxide  21 L  


 


BUN   


 


Creatinine   


 


Glucose  376 H  


 


POC Glucose   379 H  365 H


 


Hemoglobin A1c   


 


Iron   


 


TIBC   


 


Transferrin   


 


Direct Bilirubin   


 


AST   


 


Alkaline Phosphatase   


 


Albumin   


 


Amylase   


 


Arterial Blood Glucose   


 


Urine WBC (Auto)   














  03/01/21 03/02/21 03/02/21





  21:14 07:47 10:54


 


RBC   


 


Hgb   


 


Hct   


 


MCV   


 


MCH   


 


Lymph % (Auto)   


 


Mono % (Auto)   


 


Lymph # (Auto)   


 


Mono # (Auto)   


 


Seg Neutrophils %   


 


PT   


 


INR   


 


ABG Chloride    92.0 L


 


ABG Glucose    442 H


 


Sodium   


 


Potassium   


 


Chloride   


 


Carbon Dioxide   


 


BUN   


 


Creatinine   


 


Glucose   


 


POC Glucose  337 H  330 H 


 


Hemoglobin A1c   


 


Iron   


 


TIBC   


 


Transferrin   


 


Direct Bilirubin   


 


AST   


 


Alkaline Phosphatase   


 


Albumin   


 


Amylase   


 


Arterial Blood Glucose    442 H


 


Urine WBC (Auto)   














  03/02/21 03/02/21 03/02/21





  11:12 15:37 17:21


 


RBC   


 


Hgb   


 


Hct   


 


MCV   


 


MCH   


 


Lymph % (Auto)   


 


Mono % (Auto)   


 


Lymph # (Auto)   


 


Mono # (Auto)   


 


Seg Neutrophils %   


 


PT   


 


INR   


 


ABG Chloride   


 


ABG Glucose   


 


Sodium   


 


Potassium   


 


Chloride   


 


Carbon Dioxide   


 


BUN   


 


Creatinine   


 


Glucose   


 


POC Glucose  397 H  448 H  329 H


 


Hemoglobin A1c   


 


Iron   


 


TIBC   


 


Transferrin   


 


Direct Bilirubin   


 


AST   


 


Alkaline Phosphatase   


 


Albumin   


 


Amylase   


 


Arterial Blood Glucose   


 


Urine WBC (Auto)   














  03/02/21 03/03/21 03/03/21





  21:06 08:07 11:39


 


RBC   


 


Hgb   


 


Hct   


 


MCV   


 


MCH   


 


Lymph % (Auto)   


 


Mono % (Auto)   


 


Lymph # (Auto)   


 


Mono # (Auto)   


 


Seg Neutrophils %   


 


PT   


 


INR   


 


ABG Chloride   


 


ABG Glucose   


 


Sodium   


 


Potassium   


 


Chloride   


 


Carbon Dioxide   


 


BUN   


 


Creatinine   


 


Glucose   


 


POC Glucose  179 H  339 H  361 H


 


Hemoglobin A1c   


 


Iron   


 


TIBC   


 


Transferrin   


 


Direct Bilirubin   


 


AST   


 


Alkaline Phosphatase   


 


Albumin   


 


Amylase   


 


Arterial Blood Glucose   


 


Urine WBC (Auto)   














  03/03/21





  13:07


 


RBC 


 


Hgb 


 


Hct 


 


MCV 


 


MCH 


 


Lymph % (Auto) 


 


Mono % (Auto) 


 


Lymph # (Auto) 


 


Mono # (Auto) 


 


Seg Neutrophils % 


 


PT 


 


INR 


 


ABG Chloride 


 


ABG Glucose 


 


Sodium 


 


Potassium 


 


Chloride 


 


Carbon Dioxide 


 


BUN 


 


Creatinine 


 


Glucose 


 


POC Glucose  459 H


 


Hemoglobin A1c 


 


Iron 


 


TIBC 


 


Transferrin 


 


Direct Bilirubin 


 


AST 


 


Alkaline Phosphatase 


 


Albumin 


 


Amylase 


 


Arterial Blood Glucose 


 


Urine WBC (Auto)

## 2021-03-04 RX ADMIN — INSULIN HUMAN SCH UNIT: 100 INJECTION, SUSPENSION SUBCUTANEOUS at 08:27

## 2021-03-04 RX ADMIN — LISINOPRIL SCH MG: 10 TABLET ORAL at 09:55

## 2021-03-04 RX ADMIN — IPRATROPIUM BROMIDE AND ALBUTEROL SULFATE SCH AMPUL: .5; 3 SOLUTION RESPIRATORY (INHALATION) at 08:15

## 2021-03-04 RX ADMIN — ARFORMOTEROL TARTRATE SCH: 15 SOLUTION RESPIRATORY (INHALATION) at 21:19

## 2021-03-04 RX ADMIN — FUROSEMIDE SCH MG: 40 TABLET ORAL at 17:51

## 2021-03-04 RX ADMIN — HEPARIN SODIUM SCH UNIT: 5000 INJECTION, SOLUTION INTRAVENOUS; SUBCUTANEOUS at 09:55

## 2021-03-04 RX ADMIN — FAMOTIDINE SCH MG: 20 TABLET ORAL at 21:32

## 2021-03-04 RX ADMIN — Medication SCH MG: at 09:54

## 2021-03-04 RX ADMIN — ARFORMOTEROL TARTRATE SCH MCG: 15 SOLUTION RESPIRATORY (INHALATION) at 08:15

## 2021-03-04 RX ADMIN — BUDESONIDE SCH MG: 0.5 INHALANT RESPIRATORY (INHALATION) at 21:18

## 2021-03-04 RX ADMIN — BENZONATATE SCH MG: 100 CAPSULE ORAL at 06:43

## 2021-03-04 RX ADMIN — IPRATROPIUM BROMIDE AND ALBUTEROL SULFATE SCH AMPUL: .5; 3 SOLUTION RESPIRATORY (INHALATION) at 21:18

## 2021-03-04 RX ADMIN — FAMOTIDINE SCH MG: 20 TABLET ORAL at 09:55

## 2021-03-04 RX ADMIN — INSULIN LISPRO SCH UNIT: 100 INJECTION, SOLUTION INTRAVENOUS; SUBCUTANEOUS at 16:09

## 2021-03-04 RX ADMIN — Medication SCH ML: at 09:56

## 2021-03-04 RX ADMIN — INSULIN LISPRO SCH UNIT: 100 INJECTION, SOLUTION INTRAVENOUS; SUBCUTANEOUS at 12:30

## 2021-03-04 RX ADMIN — NEPHROCAP SCH CAP: 1 CAP ORAL at 09:54

## 2021-03-04 RX ADMIN — NICOTINE SCH: 21 PATCH TRANSDERMAL at 09:56

## 2021-03-04 RX ADMIN — FUROSEMIDE SCH MG: 40 TABLET ORAL at 06:43

## 2021-03-04 RX ADMIN — INSULIN HUMAN SCH UNIT: 100 INJECTION, SUSPENSION SUBCUTANEOUS at 17:02

## 2021-03-04 RX ADMIN — BENZONATATE SCH MG: 100 CAPSULE ORAL at 14:03

## 2021-03-04 RX ADMIN — BUDESONIDE SCH MG: 0.5 INHALANT RESPIRATORY (INHALATION) at 08:15

## 2021-03-04 RX ADMIN — BENZONATATE SCH MG: 100 CAPSULE ORAL at 21:32

## 2021-03-04 RX ADMIN — HEPARIN SODIUM SCH UNIT: 5000 INJECTION, SOLUTION INTRAVENOUS; SUBCUTANEOUS at 21:31

## 2021-03-04 RX ADMIN — FLUTICASONE PROPIONATE SCH MCG: 50 SPRAY, METERED NASAL at 09:53

## 2021-03-04 RX ADMIN — INSULIN LISPRO SCH UNIT: 100 INJECTION, SOLUTION INTRAVENOUS; SUBCUTANEOUS at 21:31

## 2021-03-04 RX ADMIN — Medication SCH ML: at 21:32

## 2021-03-04 RX ADMIN — INSULIN LISPRO SCH UNIT: 100 INJECTION, SOLUTION INTRAVENOUS; SUBCUTANEOUS at 08:25

## 2021-03-04 RX ADMIN — MULTIVITAMIN TABLET SCH EACH: TABLET at 09:58

## 2021-03-04 RX ADMIN — PRAVASTATIN SODIUM SCH MG: 20 TABLET ORAL at 21:32

## 2021-03-04 RX ADMIN — ASPIRIN SCH MG: 325 TABLET ORAL at 09:54

## 2021-03-04 NOTE — PROGRESS NOTE
Assessment and Plan








Abdominal pain, unspecified.


Acute exacerbation of chronic obstructive pulmonary disease.


Possible heart failure.


Morbid obesity.


Obstructive sleep apnea.


Pulmonary hypertension.


Anemia that is microcytic





- continue to wean supplemental oxygen to keep O2 sats > 90%


- continue bronchodilators (JANICE & LABA) with pulm hygiene per RT


- continue systemic steroids with slow taper


- continue inhaled corticosteroids


- continue NIV scheduled qhs re: LORE


- avoid nephrotoxins, renally dose all medications


- continue mobility protocols to prevent pressure ulcers


- PT/OT as tolerated


- Wound care per RN/WCT


- accuchecks with glycemic control per SSI for target blood glucose < 180 mg/dL 


- Smoking abstinence strongly counseled at the bedside  


- home oxygen evaluation at discharge


- GI & VTE prophylaxis


- Flu & pneumovax per protocol


- Pulmonary out patient follow up for PFTs and optimization of respiratory 

status


- continue other care per attending / other consultants


- prn analgesia per pain score





... re-evaluate in am & prn











Subjective


Date of service: 03/04/21


Principal diagnosis: Abdominal pain, alcohol abuse, abdominal distention.


Interval history: 





Patient is seen today for: Abdominal pain, unspecified; AE-COPD; Possible heart 

failure; Morbid obesity; LORE; Pulmonary HTN (Mild)





Seen and examined at bedside; 24hour events reviewed; nursing and respiratory 

care staff consulted; no adverse overnight events reported to me; resting 

peacefully in bed;





Objective


                               Vital Signs - 12hr











  03/04/21 03/04/21 03/04/21





  04:02 07:18 08:15


 


Temperature 98.0 F 97.4 F L 


 


Pulse Rate 102 H 96 H 


 


Pulse Rate [   107 H





Anterior   





Bilateral   





Throughout]   


 


Respiratory 20 18 





Rate   


 


Respiratory   18





Rate [Anterior   





Bilateral   





Throughout]   


 


Blood Pressure 115/63 136/73 


 


O2 Sat by Pulse 96 95 





Oximetry   














  03/04/21 03/04/21





  09:55 10:00


 


Temperature  


 


Pulse Rate 96 H 


 


Pulse Rate [  





Anterior  





Bilateral  





Throughout]  


 


Respiratory  





Rate  


 


Respiratory  





Rate [Anterior  





Bilateral  





Throughout]  


 


Blood Pressure 136/73 


 


O2 Sat by Pulse  97





Oximetry  











Constitutional: no acute distress, alert, other (middle aged obese male with 

mildly increased respiratopry effort at rest)


Eyes: non-icteric


ENT: oropharynx moist


Neck: supple, no lymphadenopathy, no JVD


Effort: mildly labored


Ascultation: Bilateral: diminished breath sounds, rhonchi (scant), other 

(Prolonged expiratory phase.)


Percussion: Bilateral: not dull


Cardiovascular: regular rate and rhythm


Gastrointestinal: normoactive bowel sounds, soft, non-tender, non-distended 

(protuberant)


Integumentary: normal


Extremities: no cyanosis, pink and warm, pulses normal, edema (trace)


Neurologic: normal mental status, non-focal exam, pupils equal and round, motor 

strength normal and


Psychiatric: mood appropriate, affect normal


CBC and BMP: 


                                 02/26/21 04:14





                                 03/01/21 07:46


ABG, PT/INR, D-dimer: 


ABG











ABG pH  7.417  (7.320-7.450)   03/02/21  10:54    


 


POC ABG pCO2  36.3 mmHg (32.0-48.0)   03/02/21  10:54    


 


POC ABG pO2  84.1 mmHg ()   03/02/21  10:54    


 


POC ABG HCO3  22.9   03/02/21  10:54    





PT/INR, D-dimer











PT  15.9 Sec. (12.2-14.9)  H  02/25/21  15:26    


 


INR  1.27  (0.87-1.13)  H  02/25/21  15:26    








Abnormal lab findings: 


                                  Abnormal Labs











  02/25/21 02/25/21 02/25/21





  15:22 15:26 15:26


 


RBC  3.09 L  


 


Hgb  10.5 L  


 


Hct  31.9 L  


 


MCV  103 H  


 


MCH  34 H  


 


Lymph % (Auto)  8.1 L  


 


Mono % (Auto)  14.1 H  


 


Lymph # (Auto)  0.5 L  


 


Mono # (Auto)  0.9 H  


 


Seg Neutrophils %  76.7 H  


 


PT   15.9 H 


 


INR   1.27 H 


 


ABG Chloride   


 


ABG Glucose   


 


Sodium    130 L


 


Potassium   


 


Chloride    88.8 L


 


Carbon Dioxide   


 


BUN    6 L


 


Creatinine   


 


Glucose    334 H


 


POC Glucose   


 


Hemoglobin A1c   


 


Iron   


 


TIBC   


 


Transferrin   


 


Direct Bilirubin   


 


AST    50 H


 


Alkaline Phosphatase    214 H


 


Albumin    3.2 L


 


Amylase   


 


Arterial Blood Glucose   


 


Urine WBC (Auto)   














  02/26/21 02/26/21 02/26/21





  02:22 04:14 04:14


 


RBC   3.10 L 


 


Hgb   10.6 L 


 


Hct   32.5 L 


 


MCV   105 H 


 


MCH   34 H 


 


Lymph % (Auto)   4.9 L 


 


Mono % (Auto)   


 


Lymph # (Auto)   0.3 L 


 


Mono # (Auto)   


 


Seg Neutrophils %   88.0 H 


 


PT   


 


INR   


 


ABG Chloride   


 


ABG Glucose   


 


Sodium    132 L


 


Potassium   


 


Chloride    91.0 L


 


Carbon Dioxide   


 


BUN    5 L


 


Creatinine    0.6 L


 


Glucose    236 H


 


POC Glucose   


 


Hemoglobin A1c   


 


Iron   


 


TIBC   


 


Transferrin   


 


Direct Bilirubin   


 


AST    45 H


 


Alkaline Phosphatase    203 H


 


Albumin    3.3 L


 


Amylase   


 


Arterial Blood Glucose   


 


Urine WBC (Auto)  70.0 H  














  02/26/21 02/26/21 02/26/21





  04:14 07:47 13:32


 


RBC   


 


Hgb   


 


Hct   


 


MCV   


 


MCH   


 


Lymph % (Auto)   


 


Mono % (Auto)   


 


Lymph # (Auto)   


 


Mono # (Auto)   


 


Seg Neutrophils %   


 


PT   


 


INR   


 


ABG Chloride   


 


ABG Glucose   


 


Sodium   


 


Potassium   


 


Chloride   


 


Carbon Dioxide   


 


BUN   


 


Creatinine   


 


Glucose   


 


POC Glucose    305 H


 


Hemoglobin A1c  9.9 H  


 


Iron   36 L 


 


TIBC   191 L 


 


Transferrin   165 L 


 


Direct Bilirubin   


 


AST   


 


Alkaline Phosphatase   


 


Albumin   


 


Amylase   


 


Arterial Blood Glucose   


 


Urine WBC (Auto)   














  02/26/21 02/26/21 02/27/21





  17:34 22:06 04:26


 


RBC   


 


Hgb   


 


Hct   


 


MCV   


 


MCH   


 


Lymph % (Auto)   


 


Mono % (Auto)   


 


Lymph # (Auto)   


 


Mono # (Auto)   


 


Seg Neutrophils %   


 


PT   


 


INR   


 


ABG Chloride   


 


ABG Glucose   


 


Sodium    134 L


 


Potassium    5.4 H D


 


Chloride    91.6 L


 


Carbon Dioxide    18 L


 


BUN   


 


Creatinine   


 


Glucose    360 H


 


POC Glucose   362 H 


 


Hemoglobin A1c   


 


Iron   


 


TIBC   


 


Transferrin   


 


Direct Bilirubin  0.5 H  


 


AST  55 H  


 


Alkaline Phosphatase  201 H  


 


Albumin  3.6 L  


 


Amylase  10 L  


 


Arterial Blood Glucose   


 


Urine WBC (Auto)   














  02/27/21 02/27/21 02/27/21





  08:12 12:27 16:29


 


RBC   


 


Hgb   


 


Hct   


 


MCV   


 


MCH   


 


Lymph % (Auto)   


 


Mono % (Auto)   


 


Lymph # (Auto)   


 


Mono # (Auto)   


 


Seg Neutrophils %   


 


PT   


 


INR   


 


ABG Chloride   


 


ABG Glucose   


 


Sodium   


 


Potassium   


 


Chloride   


 


Carbon Dioxide   


 


BUN   


 


Creatinine   


 


Glucose   


 


POC Glucose  385 H  335 H  294 H


 


Hemoglobin A1c   


 


Iron   


 


TIBC   


 


Transferrin   


 


Direct Bilirubin   


 


AST   


 


Alkaline Phosphatase   


 


Albumin   


 


Amylase   


 


Arterial Blood Glucose   


 


Urine WBC (Auto)   














  02/27/21 02/28/21 02/28/21





  21:19 07:34 12:08


 


RBC   


 


Hgb   


 


Hct   


 


MCV   


 


MCH   


 


Lymph % (Auto)   


 


Mono % (Auto)   


 


Lymph # (Auto)   


 


Mono # (Auto)   


 


Seg Neutrophils %   


 


PT   


 


INR   


 


ABG Chloride   


 


ABG Glucose   


 


Sodium   


 


Potassium   


 


Chloride   


 


Carbon Dioxide   


 


BUN   


 


Creatinine   


 


Glucose   


 


POC Glucose  351 H  356 H  344 H


 


Hemoglobin A1c   


 


Iron   


 


TIBC   


 


Transferrin   


 


Direct Bilirubin   


 


AST   


 


Alkaline Phosphatase   


 


Albumin   


 


Amylase   


 


Arterial Blood Glucose   


 


Urine WBC (Auto)   














  02/28/21 02/28/21 03/01/21





  16:37 21:17 07:26


 


RBC   


 


Hgb   


 


Hct   


 


MCV   


 


MCH   


 


Lymph % (Auto)   


 


Mono % (Auto)   


 


Lymph # (Auto)   


 


Mono # (Auto)   


 


Seg Neutrophils %   


 


PT   


 


INR   


 


ABG Chloride   


 


ABG Glucose   


 


Sodium   


 


Potassium   


 


Chloride   


 


Carbon Dioxide   


 


BUN   


 


Creatinine   


 


Glucose   


 


POC Glucose  371 H  357 H  347 H


 


Hemoglobin A1c   


 


Iron   


 


TIBC   


 


Transferrin   


 


Direct Bilirubin   


 


AST   


 


Alkaline Phosphatase   


 


Albumin   


 


Amylase   


 


Arterial Blood Glucose   


 


Urine WBC (Auto)   














  03/01/21 03/01/21 03/01/21





  07:46 10:54 15:42


 


RBC   


 


Hgb   


 


Hct   


 


MCV   


 


MCH   


 


Lymph % (Auto)   


 


Mono % (Auto)   


 


Lymph # (Auto)   


 


Mono # (Auto)   


 


Seg Neutrophils %   


 


PT   


 


INR   


 


ABG Chloride   


 


ABG Glucose   


 


Sodium  136 L  


 


Potassium   


 


Chloride  92.0 L  


 


Carbon Dioxide  21 L  


 


BUN   


 


Creatinine   


 


Glucose  376 H  


 


POC Glucose   379 H  365 H


 


Hemoglobin A1c   


 


Iron   


 


TIBC   


 


Transferrin   


 


Direct Bilirubin   


 


AST   


 


Alkaline Phosphatase   


 


Albumin   


 


Amylase   


 


Arterial Blood Glucose   


 


Urine WBC (Auto)   














  03/01/21 03/02/21 03/02/21





  21:14 07:47 10:54


 


RBC   


 


Hgb   


 


Hct   


 


MCV   


 


MCH   


 


Lymph % (Auto)   


 


Mono % (Auto)   


 


Lymph # (Auto)   


 


Mono # (Auto)   


 


Seg Neutrophils %   


 


PT   


 


INR   


 


ABG Chloride    92.0 L


 


ABG Glucose    442 H


 


Sodium   


 


Potassium   


 


Chloride   


 


Carbon Dioxide   


 


BUN   


 


Creatinine   


 


Glucose   


 


POC Glucose  337 H  330 H 


 


Hemoglobin A1c   


 


Iron   


 


TIBC   


 


Transferrin   


 


Direct Bilirubin   


 


AST   


 


Alkaline Phosphatase   


 


Albumin   


 


Amylase   


 


Arterial Blood Glucose    442 H


 


Urine WBC (Auto)   














  03/02/21 03/02/21 03/02/21





  11:12 15:37 17:21


 


RBC   


 


Hgb   


 


Hct   


 


MCV   


 


MCH   


 


Lymph % (Auto)   


 


Mono % (Auto)   


 


Lymph # (Auto)   


 


Mono # (Auto)   


 


Seg Neutrophils %   


 


PT   


 


INR   


 


ABG Chloride   


 


ABG Glucose   


 


Sodium   


 


Potassium   


 


Chloride   


 


Carbon Dioxide   


 


BUN   


 


Creatinine   


 


Glucose   


 


POC Glucose  397 H  448 H  329 H


 


Hemoglobin A1c   


 


Iron   


 


TIBC   


 


Transferrin   


 


Direct Bilirubin   


 


AST   


 


Alkaline Phosphatase   


 


Albumin   


 


Amylase   


 


Arterial Blood Glucose   


 


Urine WBC (Auto)   














  03/02/21 03/03/21 03/03/21





  21:06 08:07 11:39


 


RBC   


 


Hgb   


 


Hct   


 


MCV   


 


MCH   


 


Lymph % (Auto)   


 


Mono % (Auto)   


 


Lymph # (Auto)   


 


Mono # (Auto)   


 


Seg Neutrophils %   


 


PT   


 


INR   


 


ABG Chloride   


 


ABG Glucose   


 


Sodium   


 


Potassium   


 


Chloride   


 


Carbon Dioxide   


 


BUN   


 


Creatinine   


 


Glucose   


 


POC Glucose  179 H  339 H  361 H


 


Hemoglobin A1c   


 


Iron   


 


TIBC   


 


Transferrin   


 


Direct Bilirubin   


 


AST   


 


Alkaline Phosphatase   


 


Albumin   


 


Amylase   


 


Arterial Blood Glucose   


 


Urine WBC (Auto)   














  03/03/21 03/03/21 03/03/21





  13:07 15:02 21:06


 


RBC   


 


Hgb   


 


Hct   


 


MCV   


 


MCH   


 


Lymph % (Auto)   


 


Mono % (Auto)   


 


Lymph # (Auto)   


 


Mono # (Auto)   


 


Seg Neutrophils %   


 


PT   


 


INR   


 


ABG Chloride   


 


ABG Glucose   


 


Sodium   


 


Potassium   


 


Chloride   


 


Carbon Dioxide   


 


BUN   


 


Creatinine   


 


Glucose   


 


POC Glucose  459 H  432 H  224 H


 


Hemoglobin A1c   


 


Iron   


 


TIBC   


 


Transferrin   


 


Direct Bilirubin   


 


AST   


 


Alkaline Phosphatase   


 


Albumin   


 


Amylase   


 


Arterial Blood Glucose   


 


Urine WBC (Auto)   














  03/04/21 03/04/21





  07:16 11:43


 


RBC  


 


Hgb  


 


Hct  


 


MCV  


 


MCH  


 


Lymph % (Auto)  


 


Mono % (Auto)  


 


Lymph # (Auto)  


 


Mono # (Auto)  


 


Seg Neutrophils %  


 


PT  


 


INR  


 


ABG Chloride  


 


ABG Glucose  


 


Sodium  


 


Potassium  


 


Chloride  


 


Carbon Dioxide  


 


BUN  


 


Creatinine  


 


Glucose  


 


POC Glucose  316 H  420 H


 


Hemoglobin A1c  


 


Iron  


 


TIBC  


 


Transferrin  


 


Direct Bilirubin  


 


AST  


 


Alkaline Phosphatase  


 


Albumin  


 


Amylase  


 


Arterial Blood Glucose  


 


Urine WBC (Auto)

## 2021-03-04 NOTE — DISCHARGE SUMMARY
Providers





- Providers


Date of Admission: 


02/26/21 14:43





Date of discharge: 03/04/21


Attending physician: 


KELL HILL





                                        





02/25/21 23:06


Consult to Physician [CONS] Routine 


   Comment: 


   Consulting Provider: NASIM NEWTON


   Physician Instructions: 


   Reason For Exam: Copd





02/26/21 09:45


Consult to Dietitian/Nutrition [CONS] Routine 


   Physician Instructions: 


   Reason For Exam: 


   Reason for Consult: Diet education





02/26/21 10:05


Consult to Physician [CONS] Routine 


   Comment: 


   Consulting Provider: KELLEE HALL


   Physician Instructions: 


   Reason For Exam: abdominal selling, diarrhea





02/28/21 12:55


Physical Therapy Evaluation and Treat [CONS] Routine 


   Comment: 


   Reason For Exam: Debility











Primary care physician: 


PRIMARY CARE MD








Hospitalization


Condition: Fair


Hospital course: 





This is a 46-year-old male with hypertension, COPD, LORE on CPAP, CAD pulmonary 

hypertension, alcohol abuse and former tobacco abuse presented to the emergency 

department on 2/25 with swelling and pain to bilateral lower extremities and 

abdomen associated with shortness of breath and some intermittent chest 

discomfort which has been ongoing for 2 months and progressively worsening 

during visit to emergency department via EMS.  Work-up in the emergency 

department revealed hyponatremia, hypochloremia, hyperglycemia and elevated 

liver enzymes.  Patient was admitted to the hospitalist service with consults to

 pulmonology and gastroenterology.  Patient was found to have the diagnoses 

listed below.  





Hospital course:


2/26: Patient complains of a 2-month history of diarrhea EtOH abuse.  GI was 

consulted, CIWA protocol initiated, pulmonology was consulted.  Patient was 

started on steroids, multivitamins, and his medications were consulted.  Stool s

tudies were ordered and a CT abdomen/pelvis is pending.





2/27; patient states he feels better.  I just with the swelling will go down.  

Treated with BiPAP overnight.  Morbidly obese.  Clinically from evaluating 

patient or nurse at bedside as well as previous progress notes edema has 

improved.





2/28 patient continues to improve.  Swelling has resolved.  Still have some 

swelling on abdomen.  Clinically patient doing much better.  Hospital course 

complicated by debility.  Patient has not walked since he has been here.  And 

although improving will need physical therapy.  Blood sugars also uncontrolled 

we will change medical management today.





3/1; patient today attempted to get up again from bed and was so deconditioned 

and obese had difficulty getting himself up at the side of the bed.  Hospital 

course complicated by debility awaiting physical therapy eval today.


If patient is candidate for discharge with home PT can discharge in a.m.





3/2; continue IV Levaquin and taper Solu-Medrol.  Physical therapy evaluated the

 patient and recommended cardiac rehab as an outpatient.  Patient will be 

discharged with Medrol Dosepak, breathing treatments and follow-up with PCP.  

Patient is s/p ultrasound-guided paracentesis without complications yesterday.  

Patient is felt to receive maximal hospital benefit and will be discharged home.

  





3/3: Discharge was held yesterday and today because of elevated blood glucose.  

Continue to adjust insulin doses.





3/4: Adjusted insulin doses, patient is stable for discharge.  Provided diabetic

 education.  Dedicated discharge time 35 minutes.








Discharge diagnosis:


Urinary tract infection


-2/26 urinalysis shows small leukocyte esterase and no nitrates


-2/26 urine culture pending


-Currently being treated with Levaquin was given initial dose of Rocephin.  No 

dysuria no fever.


-Supportive care stable resolving


-Discharge home 1 to 2 days with Levaquin to complete 7 days.





COPD


-Pulmonary consult, appreciate recommendations


-Steroid therapy significant improvment


-Levaquin IV we will also treat underlying UTI as well.


-Pulmicort scheduled, Proventil as needed


-Supportive care


-Supplemental oxygen as needed


-Pulmonary hygiene


-Solu-Medrol has improved with Solu-Medrol.


-Steroid taper upon discharge.





Abdominal distention has resolved.  Further work-up for cirrhosis can be 

completed outpatient.  Markers unremarkable at this time.


-2/25 bilateral lower extremity Doppler ultrasound shows no sonographic evidence

 of DVT in either lower extremity


-2/26 CT abd/pelvis shows diffuse hepatic steatosis with splenomegaly.  And 

ascites secondary to cirrhosis.


-GI following appreciate recommendations.





EtOH abuse


-Patient admits to drinking 9 beers per day and can go without alcohol intake 

for 4 days


-Seizure/fall/aspiration precautions


-CIWA protocol doing well at this particular time patient is out of any DTs.


-Supportive care


-Folate, Thiamine, multivitamin supplementation





Diastolic congestive heart failure patient has chronic diastolic heart failure 

well compensated at this time.  Stable for discharge.


-2/25 proBNP 137


-2/26 echocardiogram shows normal global left ventricle systolic function, no 

left ventricular hypertrophy, estimated fraction 55 to 60%, trace MR, trace TR, 

moderate pleural effusion, no pericardial effusion, RVSP calculated 20 mmHg, 

right ventricular global systolic function is normal


Iron deficiency anemia


-Lasix stable with current diuresis.  Can change diuresis to p.o. and 

anticipated discharge soon.


-Daily weights


-Strict intake and output 








Anemia


-Presented with H/H of 10.5/31.9 with elevated MVC and MCH 


-2/26 iron studies: Iron 36, TIBC 91, percent saturation 18.85, transferrin 165


-Multivitamin





Hyponatremia


-Presented with a sodium of 130, corrected sodium 134


-Trend BMP


-Avoid rapid correction


-Monitor neuro status


-Aviod MIVF in setting of ABD distention bilateral lower extremity swelling





Hypochloremia


-Presented with a chloride of 88


-Trend BMP


-Aviod MIVF in setting of ABD distention bilateral lower extremity swelling





Morbid obesity


-Encourage dietary and lifestyle modifications


-Consider bariatric surgery outpatient





Hypertension


-Restart home hypertensive regimen and titrate as needed


-Blood pressure monitoring per protocol 


-Hydralazine 10 mg IVP for SBP greater than 160





LORE


-Continue home CPAP therapy


-Supportive care





Diabetes mellitus


-2/26 hemoglobin A1c 9.9


-Presented with hyperglycemia 334


-SSI


-Accu-Cheks AC at bedtime


-CC cardiac diet


-Initiate long-term insulin as needed


We will start Levemir 15 units nightly.





CAD


-Continue home statin therapy


-Supportive care





Hypoalbuminemia


-Presented with albumin of 3.2


-Nutrition consulted, appreciate recommendations





Tobacco abuse


-Former smoker


-Resume home NicoDerm patches





-Debility at this particular time patient will need physical therapy.  Also 

problematic secondary to patient's obesity.


Physical therapy evaluation.  Patient made be candidate for discharge with home 

PT.





DVT prophylaxis


-GI prophylaxis


-Heparin subcu











Disposition: DC/TX-06 HOME UNDER HOME [facility]


Time spent for discharge: 34 minutes





Core Measure Documentation





- Palliative Care


Palliative Care/ Comfort Measures: Not Applicable





- Core Measures


Any of the following diagnoses?: none





Exam





- Physical Exam


Narrative exam: 





General appearance: Present: no acute distress, well-nourished





- EENT


Eyes: Present: PERRL


ENT: hearing intact, clear oral mucosa





- Neck


Neck: Present: supple, normal ROM





- Respiratory


Respiratory effort: normal


Respiratory: bilateral: CTA





- Cardiovascular


Heart Sounds: Present: S1 & S2.  Absent: rub, click





- Extremities


Extremities: pulses symmetrical, No edema


Peripheral Pulses: within normal limits





- Abdominal


General gastrointestinal: Present: soft, non-tender, non-distended, normal bowel

 sounds


Male genitourinary: Present: normal





- Integumentary


Integumentary: Present: clear, warm, dry





- Musculoskeletal


Musculoskeletal: gait normal, strength equal bilaterally





- Psychiatric


Psychiatric: appropriate mood/affect, intact judgment & insight





- Neurologic


Neurologic: CNII-XII intact, moves all extremities











- Constitutional


Vitals: 


                                        











Temp Pulse Resp BP Pulse Ox


 


 97.4 F L  96 H  18   136/73   97 


 


 03/04/21 07:18  03/04/21 09:55  03/04/21 08:15  03/04/21 09:55  03/04/21 10:00














Plan


Activity: advance as tolerated


Weight Bearing Status: Weight Bear as Tolerated


Diet: low fat, diabetic


Special Instructions: record blood sugar diary


Additional Instructions: Outpatient follow-up with [facility] in 1 to 2 weeks at [facility]


Follow up with: 


PRIMARY CARE,MD [Primary Care Provider] - 7 Days


YAMILETH VALDEZ MD [Staff Physician] - 7 Days


Prescriptions: 


Pravastatin Sodium [Pravastatin] 10 mg PO QHS #30 tablet


amLODIPine 5 mg PO QDAY #30 tablet


Aspirin 325 mg PO QDAY #30 tablet


Ipratropium (Nf) [Atrovent HFA 17MCG/PUFF] 2 puff IH Q6HR PRN #1 inha


 PRN Reason: Dyspnea


Fluticasone [Flonase] 1 spray NS QDAY #1 bottle


metFORMIN [Glucophage] 850 mg PO BID #60 tablet


Nicotine [Habitrol] 21 mg TD QDAY #30 patch


Lispro Insulin [HumaLOG] 0 unit SUB-Q ACHS 30 Days


levoFLOXacin [Levaquin] 750 mg PO QDAY #5 tablet


Multivitamin Tab [Multiple Vitamin TAB (Theragran)] 1 each PO QDAY #30 tablet


Insulin NPH/Regular [NovoLIN 70/30] 28 unit SUB-Q BIDDIAB 30 Days


Famotidine [Pepcid] 20 mg PO BID #60 tablet


Albuterol Sulfate [Proair Respiclick] 90 mcg IH Q4HR PRN #2 [email or web address]


 PRN Reason: Wheezing


Folic Acid/Vit B Comp W-C [Renal Caps] 1 cap PO QDAY #30 capsule


Thiamine [Vitamin B-1] 100 mg PO QDAY #30 tablet


lisinopriL [Zestril TAB] 10 mg PO QDAY #30 tablet


Other Discharge Orders: 


Glucometer  (Amb) Location: None Selected


Glucometer supplies[Amb] Location: None Selected

## 2021-03-04 NOTE — GASTROENTEROLOGY PROGRESS NOTE
Assessment and Plan


GI: pt multiple medical problems, noted alcohol abuse now with abdominal 

swelling with ct scan raising possible cirrhosis and moderate ascites


- labs show slight increase coags, nl platelets, slight decrease albumin, not 

strongly but may be consistent with cirrhos


- LVP w/ 3.4L removed, study results pending


- liver related serologies benign


- diet as tolerated


- from GI/liver standpoint most of evaluation and management can be done as 

outpt especially if get lvp today


- will signs off, call if needed








Subjective


Date of service: 03/04/21


Principal diagnosis: Abdominal pain, alcohol abuse, abdominal distention.


Interval history: 


- no new GI complaints








Objective





- Constitutional


Vitals: 


                                        











Temp Pulse Resp BP Pulse Ox


 


 97.4 F L  96 H  18   136/73   97 


 


 03/04/21 07:18  03/04/21 09:55  03/04/21 08:15  03/04/21 09:55  03/04/21 10:00











General appearance: no acute distress





- EENT


Eyes: PERRL





- Respiratory


Respiratory: bilateral: CTA





- Cardiovascular


Rhythm: regular


Heart Sounds: Present: S1 & S2





- Gastrointestinal


General gastrointestinal: Present: soft, non-tender, non-distended





- Labs


CBC & Chem 7: 


                                 02/26/21 04:14





                                 03/01/21 07:46


Labs: 


                         Laboratory Results - last 24 hr











  03/03/21 03/04/21 03/04/21





  21:06 07:16 11:43


 


POC Glucose  224 H  316 H  420 H

## 2021-03-05 VITALS — SYSTOLIC BLOOD PRESSURE: 157 MMHG | DIASTOLIC BLOOD PRESSURE: 86 MMHG

## 2021-03-05 RX ADMIN — Medication SCH MG: at 09:16

## 2021-03-05 RX ADMIN — FAMOTIDINE SCH MG: 20 TABLET ORAL at 09:16

## 2021-03-05 RX ADMIN — ARFORMOTEROL TARTRATE SCH: 15 SOLUTION RESPIRATORY (INHALATION) at 07:46

## 2021-03-05 RX ADMIN — Medication SCH ML: at 09:27

## 2021-03-05 RX ADMIN — INSULIN LISPRO SCH UNIT: 100 INJECTION, SOLUTION INTRAVENOUS; SUBCUTANEOUS at 09:14

## 2021-03-05 RX ADMIN — IPRATROPIUM BROMIDE AND ALBUTEROL SULFATE SCH: .5; 3 SOLUTION RESPIRATORY (INHALATION) at 07:46

## 2021-03-05 RX ADMIN — LISINOPRIL SCH MG: 10 TABLET ORAL at 09:15

## 2021-03-05 RX ADMIN — NICOTINE SCH: 21 PATCH TRANSDERMAL at 09:17

## 2021-03-05 RX ADMIN — ASPIRIN SCH MG: 325 TABLET ORAL at 09:15

## 2021-03-05 RX ADMIN — NEPHROCAP SCH CAP: 1 CAP ORAL at 09:16

## 2021-03-05 RX ADMIN — FLUTICASONE PROPIONATE SCH MCG: 50 SPRAY, METERED NASAL at 09:25

## 2021-03-05 RX ADMIN — HEPARIN SODIUM SCH UNIT: 5000 INJECTION, SOLUTION INTRAVENOUS; SUBCUTANEOUS at 09:16

## 2021-03-05 RX ADMIN — BUDESONIDE SCH: 0.5 INHALANT RESPIRATORY (INHALATION) at 07:46

## 2021-03-05 RX ADMIN — FUROSEMIDE SCH MG: 40 TABLET ORAL at 06:23

## 2021-03-05 RX ADMIN — MULTIVITAMIN TABLET SCH EACH: TABLET at 09:16

## 2021-03-05 RX ADMIN — BENZONATATE SCH MG: 100 CAPSULE ORAL at 06:23

## 2021-03-05 RX ADMIN — INSULIN HUMAN SCH UNIT: 100 INJECTION, SUSPENSION SUBCUTANEOUS at 09:52

## 2021-03-05 NOTE — EVENT NOTE
Date: 03/05/21





Patient was planned for discharge yesterday but blood glucose remain elevated, 

adjusted insulin dose and finally came down below 300 before bedtime.


Patient plan to go home today


Further insulin adjustment will be done as an outpatient


Patient was thoroughly counseled for dietary restriction and compliance 


he verbalized understanding


Home health has been set up


[facility] at the [facility]

## 2021-03-08 LAB — AMYLASE,BODY FLUID: < 10

## 2021-03-21 ENCOUNTER — HOSPITAL ENCOUNTER (OUTPATIENT)
Dept: HOSPITAL 5 - ED | Age: 46
Setting detail: OBSERVATION
LOS: 1 days | Discharge: HOME | End: 2021-03-22
Attending: INTERNAL MEDICINE | Admitting: INTERNAL MEDICINE
Payer: COMMERCIAL

## 2021-03-21 DIAGNOSIS — F10.129: ICD-10-CM

## 2021-03-21 DIAGNOSIS — I11.0: Primary | ICD-10-CM

## 2021-03-21 DIAGNOSIS — Z79.4: ICD-10-CM

## 2021-03-21 DIAGNOSIS — Z79.82: ICD-10-CM

## 2021-03-21 DIAGNOSIS — E66.01: ICD-10-CM

## 2021-03-21 DIAGNOSIS — R06.00: ICD-10-CM

## 2021-03-21 DIAGNOSIS — J44.9: ICD-10-CM

## 2021-03-21 DIAGNOSIS — Z87.891: ICD-10-CM

## 2021-03-21 DIAGNOSIS — I50.9: ICD-10-CM

## 2021-03-21 DIAGNOSIS — R07.89: ICD-10-CM

## 2021-03-21 DIAGNOSIS — E11.9: ICD-10-CM

## 2021-03-21 DIAGNOSIS — R18.8: ICD-10-CM

## 2021-03-21 DIAGNOSIS — Z98.890: ICD-10-CM

## 2021-03-21 LAB
ALBUMIN SERPL-MCNC: 3.5 G/DL (ref 3.9–5)
ALT SERPL-CCNC: 27 UNITS/L (ref 7–56)
BASOPHILS # (AUTO): 0.1 K/MM3 (ref 0–0.1)
BASOPHILS NFR BLD AUTO: 0.7 % (ref 0–1.8)
BILIRUB UR QL STRIP: (no result)
BLOOD UR QL VISUAL: (no result)
BUN SERPL-MCNC: 5 MG/DL (ref 9–20)
BUN/CREAT SERPL: 8 %
CALCIUM SERPL-MCNC: 8.6 MG/DL (ref 8.4–10.2)
EOSINOPHIL # BLD AUTO: 0.6 K/MM3 (ref 0–0.4)
EOSINOPHIL NFR BLD AUTO: 6.3 % (ref 0–4.3)
HCT VFR BLD CALC: 32.3 % (ref 35.5–45.6)
HEMOLYSIS INDEX: 7
HGB BLD-MCNC: 11 GM/DL (ref 11.8–15.2)
INR PPP: 0.99 (ref 0.87–1.13)
LYMPHOCYTES # BLD AUTO: 1.2 K/MM3 (ref 1.2–5.4)
LYMPHOCYTES NFR BLD AUTO: 12.4 % (ref 13.4–35)
MCHC RBC AUTO-ENTMCNC: 34 % (ref 32–34)
MCV RBC AUTO: 99 FL (ref 84–94)
MONOCYTES # (AUTO): 1 K/MM3 (ref 0–0.8)
MONOCYTES % (AUTO): 10.1 % (ref 0–7.3)
MUCOUS THREADS #/AREA URNS HPF: (no result) /HPF
PH UR STRIP: 5 [PH] (ref 5–7)
PLATELET # BLD: 324 K/MM3 (ref 140–440)
PROT UR STRIP-MCNC: (no result) MG/DL
RBC # BLD AUTO: 3.28 M/MM3 (ref 3.65–5.03)
RBC #/AREA URNS HPF: 3 /HPF (ref 0–6)
UROBILINOGEN UR-MCNC: < 2 MG/DL (ref ?–2)
WBC #/AREA URNS HPF: 4 /HPF (ref 0–6)

## 2021-03-21 PROCEDURE — 94640 AIRWAY INHALATION TREATMENT: CPT

## 2021-03-21 PROCEDURE — 36415 COLL VENOUS BLD VENIPUNCTURE: CPT

## 2021-03-21 PROCEDURE — 93005 ELECTROCARDIOGRAM TRACING: CPT

## 2021-03-21 PROCEDURE — 85610 PROTHROMBIN TIME: CPT

## 2021-03-21 PROCEDURE — 81001 URINALYSIS AUTO W/SCOPE: CPT

## 2021-03-21 PROCEDURE — 96375 TX/PRO/DX INJ NEW DRUG ADDON: CPT

## 2021-03-21 PROCEDURE — 82550 ASSAY OF CK (CPK): CPT

## 2021-03-21 PROCEDURE — 99285 EMERGENCY DEPT VISIT HI MDM: CPT

## 2021-03-21 PROCEDURE — 82962 GLUCOSE BLOOD TEST: CPT

## 2021-03-21 PROCEDURE — 80061 LIPID PANEL: CPT

## 2021-03-21 PROCEDURE — 82553 CREATINE MB FRACTION: CPT

## 2021-03-21 PROCEDURE — 96372 THER/PROPH/DIAG INJ SC/IM: CPT

## 2021-03-21 PROCEDURE — 84484 ASSAY OF TROPONIN QUANT: CPT

## 2021-03-21 PROCEDURE — 83880 ASSAY OF NATRIURETIC PEPTIDE: CPT

## 2021-03-21 PROCEDURE — G0378 HOSPITAL OBSERVATION PER HR: HCPCS

## 2021-03-21 PROCEDURE — 96374 THER/PROPH/DIAG INJ IV PUSH: CPT

## 2021-03-21 PROCEDURE — 71045 X-RAY EXAM CHEST 1 VIEW: CPT

## 2021-03-21 PROCEDURE — 80053 COMPREHEN METABOLIC PANEL: CPT

## 2021-03-21 PROCEDURE — 85025 COMPLETE CBC W/AUTO DIFF WBC: CPT

## 2021-03-21 PROCEDURE — 80048 BASIC METABOLIC PNL TOTAL CA: CPT

## 2021-03-21 NOTE — XRAY REPORT
CHEST 1 VIEW 



INDICATION: 

chest pain, shortness of breath



COMPARISON: 

2/25/2021



FINDINGS:



SUPPORT DEVICES: None.



HEART / MEDIASTINUM: No significant abnormality.



LUNGS / PLEURA: No significant pulmonary or pleural abnormality. No pneumothorax. 



ADDITIONAL FINDINGS: 



IMPRESSION:

1. No acute cardiopulmonary disease



Signer Name: Ramana Gramajo MD 

Signed: 3/21/2021 9:53 PM

Workstation Name: [id number]

## 2021-03-21 NOTE — EMERGENCY DEPARTMENT REPORT
HPI





- General


Chief Complaint: Dyspnea/Respdistress


Time Seen by Provider: 21 21:06





- HPI


HPI: 





Room 21








The patient is a 46-year-old male present with a chief complaint of lower 

extremity edema and shortness of breath.  Patient states since his discharge 

from this hospital in the beginning of this month is gradually had return of 

swelling to the bilateral lower extremities and abdomen.  Patient states they 

were sent severely over the past 2 days.  The patient states over the past 2 

days he has had worsening dyspnea on exertion and shortness of breath.  Patient 

states he is also had intermittent substernal chest pain is been sharp in nature

associated with nausea/vomiting and diaphoresis.  Patient admits to a cough 

occasionally productive of clear to green sputum for the past week.  Patient 

states he is uncertain if he has had a fever.  Patient states he has been 

compliant with his Lasix.  The patient states he sleeps on 2 pillows and this 

has not changed recently.  Patient currently gives his chest pain score 7-8/10. 

Patient states he believes his last cardiac catheterization occurred in 2017 and

it did show a blockage but it was not "big enough" for stenting.





ED Past Medical Hx





- Past Medical History


Previous Medical History?: Yes


Hx Hypertension: Yes


Hx Congestive Heart Failure: Yes


Hx Diabetes: Yes


Hx COPD: Yes (No home O2)


Additional medical history: Pulmonary hypertension





- Surgical History


Past Surgical History?: No


Additional Surgical History: Cyst removed from eye





- Family History


Family history: no significant





- Social History


Smoking Status: Former Smoker (None x1 year)


Substance Use Type: None (Denies illicit drug use), Alcohol (No alcohol x3 

weeks)





- Medications


Home Medications: 


                                Home Medications











 Medication  Instructions  Recorded  Confirmed  Last Taken  Type


 


ALBUTEROL Inhaler(NF) [VENTOLIN 2 puff IH QID PRN #1 inha 19 

Unknown Rx





Inhaler(NF)]     


 


Albuterol Sulfate [Proair 90 mcg IH Q4HR PRN #2 aer.pow.ba 21  Unknown Rx





Respiclick]     


 


Aspirin 325 mg PO QDAY #30 tablet 21  Unknown Rx


 


Famotidine [Pepcid] 20 mg PO BID #60 tablet 21  Unknown Rx


 


Fluticasone [Flonase] 1 spray NS QDAY #1 bottle 21  Unknown Rx


 


Folic Acid/Vit B Comp W-C [Renal 1 cap PO QDAY #30 capsule 21  Unknown Rx





Caps]     


 


Ipratropium (Nf) [Atrovent HFA 2 puff IH Q6HR PRN #1 inha 21  Unknown Rx





17MCG/PUFF]     


 


Multivitamin Tab [Multiple Vitamin 1 each PO QDAY #30 tablet 21  Unknown 

Rx





TAB (Theragran)]     


 


Nicotine [Habitrol] 21 mg TD QDAY #30 patch 21  Unknown Rx


 


Pravastatin Sodium [Pravastatin] 10 mg PO QHS #30 tablet 21  Unknown Rx


 


Thiamine [Vitamin B-1] 100 mg PO QDAY #30 tablet 21  Unknown Rx


 


amLODIPine 5 mg PO QDAY #30 tablet 21  Unknown Rx


 


levoFLOXacin [Levaquin] 750 mg PO QDAY #5 tablet 21  Unknown Rx


 


lisinopriL [Zestril TAB] 10 mg PO QDAY #30 tablet 21  Unknown Rx


 


metFORMIN [Glucophage] 850 mg PO BID #60 tablet 21  Unknown Rx


 


Insulin NPH/Regular [NovoLIN 70/30] 28 unit SUB-Q BIDDIAB 30 Days 21  

Unknown Rx


 


Lispro Insulin [HumaLOG] 0 unit SUB-Q ACHS 30 Days 21  Unknown Rx














ED Review of Systems


ROS: 


Stated complaint: FLUID RETENTION/ABD PAIN


Other details as noted in HPI





Constitutional: diaphoresis, fever (?)


Eyes: denies: eye pain


ENT: denies: throat pain


Respiratory: shortness of breath, SOB with exertion


Cardiovascular: chest pain


Endocrine: no symptoms reported


Gastrointestinal: abdominal pain, nausea, vomiting


Genitourinary: denies: dysuria


Musculoskeletal: back pain


Neurological: denies: headache





Physical Exam





- Physical Exam


Vital Signs: 


                                   Vital Signs











  21





  20:56


 


Temperature 98.8 F


 


Pulse Rate 98 H


 


Respiratory 17





Rate 


 


Blood Pressure 148/74





[left arm] 


 


O2 Sat by Pulse 98





Oximetry 











Physical Exam: 





GENERAL: The patient is well-developed well-nourished male lying on stretcher 

appearing to be in mild discomfort. []


HEENT: Normocephalic.  Atraumatic.  Extraocular motions are intact.  Patient has

 moist mucous membranes.


NECK: Supple.  Trachea midline


CHEST/LUNGS: Clear to auscultation.  There is no respiratory distress noted.


HEART/CARDIOVASCULAR: Regular.  There is no tachycardia.  There is no gallop rub

 or murmur.


ABDOMEN: Abdomen is soft, nontender.  Patient has normal bowel sounds.  There is

 no abdominal distention.


SKIN: There is no rash.  There is 1+ bilateral lower edema.  There is no 

diaphoresis.


NEURO: The patient is awake, alert, and oriented.  The patient is cooperative.  

The patient has no focal neurologic deficits.  The patient has normal speech


MUSCULOSKELETAL: There is no evidence of acute injury.





ED Course


                                   Vital Signs











  21





  20:56


 


Temperature 98.8 F


 


Pulse Rate 98 H


 


Respiratory 17





Rate 


 


Blood Pressure 148/74





[left arm] 


 


O2 Sat by Pulse 98





Oximetry 














ED Medical Decision Making





- Lab Data


Result diagrams: 


                                 21 21:25





                                 21 21:25





- EKG Data


-: EKG Interpreted by Me


EKG shows normal: sinus rhythm


Rate: normal





- EKG Data


When compared to previous EKG there are: no significant change


Interpretation: unchanged when compared t (2021)





- Radiology Data


Radiology results: report reviewed (Chest x-ray), image reviewed (Chest x-ray)


interpreted by me: 





Chest x-ray-no definite focal infiltrates.  No pneumothorax.  No foreign body 

seen





[facility] [address] [place] [address] 

XRay Report Signed Patient: FRANCISCO ARRIAGA MR#:  [id number] : 1975 

Acct:[id number] Age/Sex: 46 / M ADM Date: 21 Loc: ED Attending Dr: 

Ordering Physician: RENNY JUNIOR MD Date of Service: 21 Procedure(s): XR 

chest 1V ap Accession Number(s): [id number] cc: RENNY JUNIOR MD Fluoro Time In 

Minutes: CHEST 1 VIEW INDICATION: chest pain, shortness of breath COMPARISON: 

2021 FINDINGS: SUPPORT DEVICES: None. HEART / MEDIASTINUM: No significant 

abnormality. LUNGS / PLEURA: No significant pulmonary or pleural abnormality. No

pneumothorax. ADDITIONAL FINDINGS: IMPRESSION: 1. No acute cardiopulmonary 

disease Signer Name: Ramana Gramajo MD Signed: 3/21/2021 9:53 PM Workstation 

Name: [employer] [id number] Transcribed By: NINA Dictated By: Ramana Gramajo MD 

Electronically Authenticated By: Ramana Gramajo MD Signed Date/Time: 

21 DD/DT: 21 TD/TT: 











- Differential Diagnosis


CHF exacerbation, ACS, pericarditis, pneumonia, bronchitis,


Critical care attestation.: 


If time is entered above; I have spent that time in minutes in the direct care 

of this critically ill patient, excluding procedure time.








ED Disposition


Clinical Impression: 


 Chest pain, Dyspnea





Disposition:  OP ADMIT IP TO THIS HOSP


Is pt being admited?: Yes


Does the pt Need Aspirin: Yes


Condition: Fair


Instructions:  Nonspecific Chest Pain, Adult


Time of Disposition: 22:28 (Hospitalist paged (Dr Smith))





Heart Score





- HEART Score


History: Moderately suspicious


EKG: Normal


Age: 45-65


Risk factors: > 3 risk factors or hx of atherosclerotic disease


Troponin: < normal limit


HEART Score: 4

## 2021-03-21 NOTE — HISTORY AND PHYSICAL REPORT
History of Present Illness


Date of examination: 03/21/21


Date of admission: 


03/21/2021


Chief complaint: 





Shortness of breath


Lower extremity swelling


History of present illness: 





46-year-old male with known history of hypertension, COPD, diabetes mellitus, 

and CHF  presenting to the emergency room today complaining of shortness of 

breath and progressive swelling of his lower extremities.  Patient was just seen

in this hospital recently and discharged earlier this month with similar 

complaints.  Indicates that his swelling is increased over the past 2 days.  He 

has had significant worsening of shortness of breath and intermittent substernal

chest pain.  Chest pain is sharp in nature and he has had associated nausea and 

vomiting and occasional diaphoresis.


Patient has had some productive cough over the past week.  He has not had any 

fever. Sputum has been slightly greenish in color.  On a scale of 10 chest pain 

was said to be about 7-8 over 10 in severity.


Patient has been compliant with his medications.





Work-up in the emergency room today reveals: Slightly elevated BNP of 547.  

Chest x-ray reveals no acute cardiopulmonary abnormality.


EKG and troponin were unremarkable.





Patient is being admitted for chest pain and CHF exacerbation.





Past History


Past Medical History: COPD, diabetes, heart failure, hypertension, other 

(Pulmonary Hypertension,)


Past Surgical History: Other (Right Eye cyst removal,)


Social history: smoking (Former smpoker), alcohol abuse


Family history: no significant family history





Medications and Allergies


                                    Allergies











Allergy/AdvReac Type Severity Reaction Status Date / Time


 


Iodinated Contrast Media Allergy  Rash Verified 07/08/14 21:15











                                Home Medications











 Medication  Instructions  Recorded  Confirmed  Last Taken  Type


 


ALBUTEROL Inhaler(NF) [VENTOLIN 2 puff IH QID PRN #1 inha 02/06/19 03/21/21 

Unknown Rx





Inhaler(NF)]     


 


Albuterol Sulfate [Proair 90 mcg IH Q4HR PRN #2 aer.pow.ba 03/02/21 03/21/21 

Unknown Rx





Respiclick]     


 


Famotidine [Pepcid] 20 mg PO BID #60 tablet 03/02/21 03/21/21 Unknown Rx


 


Fluticasone [Flonase] 1 spray NS QDAY #1 bottle 03/02/21 03/21/21 Unknown Rx


 


Folic Acid/Vit B Comp W-C [Renal 1 cap PO QDAY #30 capsule 03/02/21 03/21/21 

Unknown Rx





Caps]     


 


Ipratropium (Nf) [Atrovent HFA 2 puff IH Q6HR PRN #1 inha 03/02/21 03/21/21 

Unknown Rx





17MCG/PUFF]     


 


Multivitamin Tab [Multiple Vitamin 1 each PO QDAY #30 tablet 03/02/21 03/21/21 

Unknown Rx





TAB (Theragran)]     


 


Pravastatin Sodium [Pravastatin] 10 mg PO QHS #30 tablet 03/02/21 03/21/21 

Unknown Rx


 


Thiamine [Vitamin B-1] 100 mg PO QDAY #30 tablet 03/02/21 03/21/21 Unknown Rx


 


amLODIPine 5 mg PO QDAY #30 tablet 03/02/21 03/21/21 Unknown Rx


 


lisinopriL [Zestril TAB] 10 mg PO QDAY #30 tablet 03/02/21 03/21/21 Unknown Rx


 


metFORMIN [Glucophage] 850 mg PO BID #60 tablet 03/03/21 03/21/21 Unknown Rx


 


Insulin NPH/Regular [NovoLIN 70/30] 28 unit SUB-Q BIDDIAB 30 Days 03/04/21 03/21/21 Unknown Rx


 


Lispro Insulin [HumaLOG] 0 unit SUB-Q ACHS 30 Days 03/04/21 03/21/21 Unknown Rx


 


Aspirin EC [Halfprin EC] 81 mg PO QDAY 03/21/21 03/21/21 Unknown History














Review of Systems


Constitutional: no fever, no chills


Ears, nose, mouth and throat: no nasal congestion, no sore throat


Cardiovascular: chest pain, edema, no palpitations


Respiratory: shortness of breath, no cough


Gastrointestinal: nausea, vomiting, no abdominal pain, no diarrhea


Genitourinary Male: no dysuria, no hematuria, no flank pain, no nocturia


Musculoskeletal: no neck pain, no low back pain


Integumentary: no rash, no pruritis


Neurological: no headaches, no confusion


Psychiatric: no anxiety, no depression


Endocrine: no polyphagia, no polydipsia, no polyuria, no nocturia





Exam





- Constitutional


Vitals: 


                                        











Temp Pulse Resp BP Pulse Ox


 


 98.8 F   98 H  22   125/77   93 


 


 03/21/21 20:56  03/21/21 21:00  03/21/21 21:00  03/21/21 22:30  03/21/21 22:30











General appearance: Present: no acute distress, well-nourished, obese





- EENT


Eyes: Present: PERRL, EOM intact.  Absent: scleral icterus


ENT: hearing intact, clear oral mucosa, dentition normal





- Neck


Neck: Present: supple, normal ROM





- Respiratory


Respiratory effort: normal


Respiratory: bilateral: rales





- Cardiovascular


Rhythm: regular


Heart Sounds: Present: S1 & S2.  Absent: systolic murmur, diastolic murmur, rub,

click





- Extremities


Extremities: no ischemia, pulses intact, pulses symmetrical, normal temperature,

normal color, Full ROM


Extremity abnormal: edema (3+ bilateral lower extremity edema)


Peripheral Pulses: within normal limits





- Abdominal


General gastrointestinal: Present: soft, non-tender, non-distended, normal bowel

sounds.  Absent: mass





- Integumentary


Integumentary: Present: clear, warm, dry.  Absent: rash





- Musculoskeletal


Musculoskeletal: strength equal bilaterally





- Psychiatric


Psychiatric: appropriate mood/affect, intact judgment & insight, memory intact, 

cooperative





- Neurologic


Neurologic: CNII-XII intact, no focal deficits, moves all extremities





HEART Score





- HEART Score


History: Moderately suspicious


EKG: Normal


Age: 45-65


Risk factors: > 3 risk factors or hx of atherosclerotic disease


Troponin: 


                                        











Troponin T  < 0.010 ng/mL (0.00-0.029)   03/21/21  21:25    











Troponin: < normal limit


HEART Score: 4





Results





- Labs


CBC & Chem 7: 


                                 03/22/21 00:48





                                 03/22/21 00:48


Labs: 


                              Abnormal lab results











  03/21/21 03/21/21 Range/Units





  21:25 21:25 


 


RBC  3.28 L   (3.65-5.03)  M/mm3


 


Hgb  11.0 L   (11.8-15.2)  gm/dl


 


Hct  32.3 L   (35.5-45.6)  %


 


MCV  99 H   (84-94)  fl


 


MCH  34 H   (28-32)  pg


 


Lymph % (Auto)  12.4 L   (13.4-35.0)  %


 


Mono % (Auto)  10.1 H   (0.0-7.3)  %


 


Eos % (Auto)  6.3 H   (0.0-4.3)  %


 


Mono # (Auto)  1.0 H   (0.0-0.8)  K/mm3


 


Eos # (Auto)  0.6 H   (0.0-0.4)  K/mm3


 


Seg Neutrophils %  70.5 H   (40.0-70.0)  %


 


BUN   5 L  (9-20)  mg/dL


 


Creatinine   0.6 L  (0.8-1.3)  mg/dL


 


Glucose   148 H  ()  mg/dL


 


AST   47 H  (5-40)  units/L


 


Alkaline Phosphatase   173 H  ()  units/L


 


Total Creatine Kinase   27 L  ()  units/L


 


Albumin   3.5 L  (3.9-5)  g/dL














Assessment and Plan





- Patient Problems


(1) Chest pain


Current Visit: No   Status: Acute   


Plan to address problem: 


Patient admitted and placed on telemetry.  Will check serial cardiac enzymes.  

Patient placed on daily aspirin, sublingual nitroglycerin and IV morphine as 

needed for chest pain.


We will place consult to cardiology for evaluation.








(2) CHF exacerbation


Current Visit: No   Status: Acute   


Plan to address problem: 


Patient placed on diuretics.  Will monitor inputs and outputs and also monitor 

daily weight.


Echo in 02/2021 reveals ejection fraction of 55 to 60%.








(3) HTN (hypertension)


Current Visit: No   Status: Chronic   


Plan to address problem: 


Resume routine home medications and monitor vital signs closely.








(4) Morbid obesity with BMI of 40.0-44.9, adult


Current Visit: No   Status: Chronic   


Plan to address problem: 


Lifestyle modification encouraged.


We will place dietary consult.








(5) Tobacco use


Current Visit: No   Status: Chronic   


Plan to address problem: 


Patient counseled on quitting tobacco abuse.


We will place on nicotine patch as needed.








(6) Diabetes mellitus


Current Visit: Yes   Status: Acute   


Plan to address problem: 


We will monitor Accu-Cheks closely.








(7) DVT prophylaxis


Current Visit: No   Status: Acute   


Plan to address problem: 


Patient placed on subcutaneous Lovenox.








(8) Full code status


Current Visit: No   Status: Acute   


Plan to address problem: 


Patient is full code.

## 2021-03-22 VITALS — SYSTOLIC BLOOD PRESSURE: 113 MMHG | DIASTOLIC BLOOD PRESSURE: 51 MMHG

## 2021-03-22 LAB
BASOPHILS # (AUTO): 0.1 K/MM3 (ref 0–0.1)
BASOPHILS # (AUTO): 0.1 K/MM3 (ref 0–0.1)
BASOPHILS NFR BLD AUTO: 0.6 % (ref 0–1.8)
BASOPHILS NFR BLD AUTO: 0.7 % (ref 0–1.8)
BUN SERPL-MCNC: 4 MG/DL (ref 9–20)
BUN SERPL-MCNC: 5 MG/DL (ref 9–20)
BUN/CREAT SERPL: 10 %
BUN/CREAT SERPL: 8 %
CALCIUM SERPL-MCNC: 8.5 MG/DL (ref 8.4–10.2)
CALCIUM SERPL-MCNC: 8.9 MG/DL (ref 8.4–10.2)
EOSINOPHIL # BLD AUTO: 0.7 K/MM3 (ref 0–0.4)
EOSINOPHIL # BLD AUTO: 0.8 K/MM3 (ref 0–0.4)
EOSINOPHIL NFR BLD AUTO: 6.9 % (ref 0–4.3)
EOSINOPHIL NFR BLD AUTO: 7 % (ref 0–4.3)
HCT VFR BLD CALC: 30.9 % (ref 35.5–45.6)
HCT VFR BLD CALC: 32.2 % (ref 35.5–45.6)
HDLC SERPL-MCNC: 31 MG/DL (ref 40–59)
HEMOLYSIS INDEX: 1
HEMOLYSIS INDEX: 3
HGB BLD-MCNC: 10.6 GM/DL (ref 11.8–15.2)
HGB BLD-MCNC: 10.9 GM/DL (ref 11.8–15.2)
INR PPP: 1.11 (ref 0.87–1.13)
LYMPHOCYTES # BLD AUTO: 1.4 K/MM3 (ref 1.2–5.4)
LYMPHOCYTES # BLD AUTO: 1.5 K/MM3 (ref 1.2–5.4)
LYMPHOCYTES NFR BLD AUTO: 13.3 % (ref 13.4–35)
LYMPHOCYTES NFR BLD AUTO: 13.5 % (ref 13.4–35)
MCHC RBC AUTO-ENTMCNC: 34 % (ref 32–34)
MCHC RBC AUTO-ENTMCNC: 34 % (ref 32–34)
MCV RBC AUTO: 98 FL (ref 84–94)
MCV RBC AUTO: 98 FL (ref 84–94)
MONOCYTES # (AUTO): 1 K/MM3 (ref 0–0.8)
MONOCYTES # (AUTO): 1.1 K/MM3 (ref 0–0.8)
MONOCYTES % (AUTO): 10.2 % (ref 0–7.3)
MONOCYTES % (AUTO): 9.7 % (ref 0–7.3)
PLATELET # BLD: 335 K/MM3 (ref 140–440)
PLATELET # BLD: 350 K/MM3 (ref 140–440)
RBC # BLD AUTO: 3.15 M/MM3 (ref 3.65–5.03)
RBC # BLD AUTO: 3.28 M/MM3 (ref 3.65–5.03)

## 2021-03-22 RX ADMIN — INSULIN LISPRO SCH UNIT: 100 INJECTION, SOLUTION INTRAVENOUS; SUBCUTANEOUS at 12:49

## 2021-03-22 RX ADMIN — INSULIN LISPRO SCH: 100 INJECTION, SOLUTION INTRAVENOUS; SUBCUTANEOUS at 09:15

## 2021-03-22 NOTE — ELECTROCARDIOGRAPH REPORT
[facility]

                                       

Test Date:    2021               Test Time:    22:22:26

Pat Name:     FRANCISCO ARRIAGA             Department:   

Patient ID:   [id number]          Room:         A487

Gender:       M                        Technician:   

:          1975               Requested By: RENNY JUNIOR

Order Number: [id number]              Reading MD:   Melissa Delaney

                                 Measurements

Intervals                              Axis          

Rate:         97                       P:            58

NE:           179                      QRS:          100

QRSD:         97                       T:            -56

QT:           358                                    

QTc:          456                                    

                           Interpretive Statements

Sinus rhythm

Right axis deviation

No previous ECG available for comparison

Electronically Signed On 3- 6:29:24 PDT by Melissa Delaney

## 2021-03-22 NOTE — CONSULTATION
History of Present Illness


Consult date: 03/22/21


Consult reason: shortness of breath


History of present illness: 





The patient is a 46-year-old man with morbid obesity, who weighs over 400 

pounds.  He has sleep apnea for which he uses a CPAP machine at home, and is 

reported with chronic alcohol abuse.  As a result of his obesity and chronic 

lung disease, he has chronic fluid overload manifested by ascites and lower 

extremity edema.  His ascites has often required paracentesis, most recently 2 

weeks ago he had 3400 cc of fluid aspirated.  His chronic ascites has been 

attributed to liver disease associated with his chronic alcoholism, and 

pulmonary disease associated with his severe sleep apnea.





He obtains his outpatient care at the [facility] but has been managed in this 

hospital several times in the past year.  An echocardiogram done last month 

showed normal left ventricular systolic function with ejection fraction 55 to 

60%, no significant valvular lesions.





EKG on this presentation is normal sinus rhythm with nonspecific ST and T wave 

abnormalities.  Chest x-ray shows a normal-sized cardiac silhouette, no evidence

of interstitial edema or heart failure.





Past History


Past Medical History: COPD, diabetes, heart failure, hypertension, other 

(Pulmonary Hypertension,)


Past Surgical History: Other (Right Eye cyst removal,)


Social history: smoking (Former smpoker), alcohol abuse


Family history: no significant family history





Medications and Allergies


                                    Allergies











Allergy/AdvReac Type Severity Reaction Status Date / Time


 


Iodinated Contrast Media Allergy  Rash Verified 07/08/14 21:15











                                Home Medications











 Medication  Instructions  Recorded  Confirmed  Last Taken  Type


 


ALBUTEROL Inhaler(NF) [VENTOLIN 2 puff IH QID PRN #1 inha 02/06/19 03/21/21 

Unknown Rx





Inhaler(NF)]     


 


Albuterol Sulfate [Proair 90 mcg IH Q4HR PRN #2 aer.[email or web address].ba 03/02/21 03/21/21 

Unknown Rx





Respiclick]     


 


Famotidine [Pepcid] 20 mg PO BID #60 tablet 03/02/21 03/21/21 Unknown Rx


 


Fluticasone [Flonase] 1 spray NS QDAY #1 bottle 03/02/21 03/21/21 Unknown Rx


 


Folic Acid/Vit B Comp W-C [Renal 1 cap PO QDAY #30 capsule 03/02/21 03/21/21 

Unknown Rx





Caps]     


 


Ipratropium (Nf) [Atrovent HFA 2 puff IH Q6HR PRN #1 inha 03/02/21 03/21/21 

Unknown Rx





17MCG/PUFF]     


 


Multivitamin Tab [Multiple Vitamin 1 each PO QDAY #30 tablet 03/02/21 03/21/21 

Unknown Rx





TAB (Theragran)]     


 


Pravastatin Sodium [Pravastatin] 10 mg PO QHS #30 tablet 03/02/21 03/21/21 

Unknown Rx


 


Thiamine [Vitamin B-1] 100 mg PO QDAY #30 tablet 03/02/21 03/21/21 Unknown Rx


 


amLODIPine 5 mg PO QDAY #30 tablet 03/02/21 03/21/21 Unknown Rx


 


lisinopriL [Zestril TAB] 10 mg PO QDAY #30 tablet 03/02/21 03/21/21 Unknown Rx


 


metFORMIN [Glucophage] 850 mg PO BID #60 tablet 03/03/21 03/21/21 Unknown Rx


 


Insulin NPH/Regular [NovoLIN 70/30] 28 unit SUB-Q BIDDIAB 30 Days 03/04/21 03/2 1/21 Unknown Rx


 


Lispro Insulin [HumaLOG] 0 unit SUB-Q ACHS 30 Days 03/04/21 03/21/21 Unknown Rx


 


Aspirin EC [Halfprin EC] 81 mg PO QDAY 03/21/21 03/21/21 Unknown History











Active Meds: 


Active Medications





Acetaminophen (Acetaminophen 325 Mg Tab)  650 mg PO Q4H PRN


   PRN Reason: Pain MILD(1-3)/Fever >100.5/HA


Albuterol (Albuterol 2.5 Mg/3 Ml Nebu)  2.5 mg IH QIDRT PRN


   PRN Reason: Shortness Of Breath


Amlodipine Besylate (Amlodipine 5 Mg Tab)  5 mg PO QDAY [facility]


   Last Admin: 03/22/21 10:53 Dose:  5 mg


   Documented by: 


Aspirin (Aspirin Ec 81 Mg Tab)  81 mg PO QDAY [facility]


   Last Admin: 03/22/21 10:53 Dose:  81 mg


   Documented by: 


Dextrose (Dextrose 50% In Water (25gm) 50 Ml Syringe)  0 ml IV Q30MIN PRN; 

Protocol


   PRN Reason: Hypoglycemia


Enoxaparin Sodium (Enoxaparin 40 Mg/0.4 Ml Inj)  40 mg SUB-Q QDAY@2200 MING; 

Protocol


Famotidine (Famotidine 20 Mg Tab)  20 mg PO BID [facility]


   Last Admin: 03/22/21 10:53 Dose:  20 mg


   Documented by: 


Fluticasone Propionate (Fluticasone Propionate Nasal Spray 16 Gm)  50 mcg NS 

QDAY [facility]


   Last Admin: 03/22/21 09:18 Dose:  50 mcg


   Documented by: 


Furosemide (Furosemide 40 Mg/4 Ml Inj)  40 mg IV BID@0600,1800 [facility]


   Last Admin: 03/22/21 05:30 Dose:  40 mg


   Documented by: 


Insulin Human Lispro (Insulin Lispro 100 Unit/Ml)  0 unit SUB-Q ACHS [facility]; 

Protocol


   Last Admin: 03/22/21 09:15 Dose:  Not Given


   Documented by: 


Lisinopril (Lisinopril 10 Mg Tab)  10 mg PO QDAY [facility]


   Last Admin: 03/22/21 10:54 Dose:  10 mg


   Documented by: 


Magnesium Hydroxide (Magnesium Hydroxide (Mom) Oral Liqd Udc)  30 ml PO Q4H PRN


   PRN Reason: Constipation


Morphine Sulfate (Morphine 4 Mg/1 Ml Inj)  2 mg IV Q5MIN PRN


   PRN Reason: Chest Pain


Multivit/Ca Carb/B Cmplx/FA/Prenat (Folic Acid/Vit B Comp W-C 1 Mg (Renal Caps))

 1 cap PO QDAY [facility]


   Last Admin: 03/22/21 10:53 Dose:  1 cap


   Documented by: 


Nitroglycerin (Nitroglycerin 0.4 Mg Tab Subl)  0.4 mg SL Q5M PRN


   PRN Reason: Chest Pain


Ondansetron HCl (Ondansetron 4 Mg/2 Ml Inj)  4 mg IV Q8H PRN


   PRN Reason: Nausea And Vomiting


Pravastatin Sodium (Pravastatin 20 Mg Tab)  10 mg PO QHS [facility]


Sodium Chloride (Sodium Chloride 0.9% 10 Ml Flush Syringe)  10 ml IV BID [facility]


   Last Admin: 03/22/21 09:20 Dose:  10 ml


   Documented by: 


Sodium Chloride (Sodium Chloride 0.9% 10 Ml Flush Syringe)  10 ml IV PRN PRN


   PRN Reason: LINE FLUSH


   Last Admin: 03/22/21 05:32 Dose:  10 ml


   Documented by: 


Thiamine HCl (Thiamine 100 Mg Tab)  100 mg PO QDAY [facility]


   Last Admin: 03/22/21 10:53 Dose:  100 mg


   Documented by: 


Tramadol HCl (Tramadol 50 Mg Tab)  50 mg PO Q6H PRN


   PRN Reason: Pain, Moderate (4-6)











Review of Systems


Cardiovascular: edema, shortness of breath, no chest pain, no orthopnea, no 

palpitations, no rapid/irregular heart beat, no syncope, no lightheadedness





Physical Examination


                                   Vital Signs











Temp Pulse Resp BP Pulse Ox


 


 98.8 F   98 H  17   148/74   98 


 


 03/21/21 20:56  03/21/21 20:56  03/21/21 20:56  03/21/21 20:56  03/21/21 20:56











General appearance: no acute distress, obese (Morbidly obese, weighs over 400 

pounds)


HEENT: Positive: PERRL


Neck: Positive: neck supple


Cardiac: Positive: Reg Rate and Rhythm


Lungs: Positive: Decreased Breath Sounds


Neuro: Positive: Grossly Intact


Abdomen: Positive: Soft


Male genitourinary: Positive: deferred


Skin: Positive: Clear


Extremities: Present: +1 Edema





Results





                                 03/22/21 07:03





                                 03/22/21 07:03


                                 Cardiac Enzymes











  03/21/21 Range/Units





  21:25 


 


AST  47 H  (5-40)  units/L


 


CK-MB (CK-2)  < 1.0  (0.0-4.0)  ng/mL








                                   Coagulation











  03/21/21 03/22/21 Range/Units





  21:25 07:03 


 


PT  12.9  14.1  (12.2-14.9)  Sec.


 


INR  0.99  1.11  (0.87-1.13)  








                                     Lipids











  03/22/21 Range/Units





  00:48 


 


Triglycerides  200 H  (2-149)  mg/dL


 


Cholesterol  143  ()  mg/dL


 


HDL Cholesterol  31 L  (40-59)  mg/dL


 


Cholesterol/HDL Ratio  4.61  %








                                       CBC











  03/21/21 03/22/21 03/22/21 Range/Units





  21:25 00:48 07:03 


 


WBC  10.0  11.1 H  10.5  (4.5-11.0)  K/mm3


 


RBC  3.28 L  3.28 L  3.15 L  (3.65-5.03)  M/mm3


 


Hgb  11.0 L  10.9 L  10.6 L  (11.8-15.2)  gm/dl


 


Hct  32.3 L  32.2 L  30.9 L  (35.5-45.6)  %


 


Plt Count  324  350  335  (140-440)  K/mm3


 


Lymph # (Auto)  1.2  1.5  1.4  (1.2-5.4)  K/mm3


 


Mono # (Auto)  1.0 H  1.1 H  1.0 H  (0.0-0.8)  K/mm3


 


Eos # (Auto)  0.6 H  0.8 H  0.7 H  (0.0-0.4)  K/mm3


 


Baso # (Auto)  0.1  0.1  0.1  (0.0-0.1)  K/mm3








                          Comprehensive Metabolic Panel











  03/21/21 03/22/21 03/22/21 Range/Units





  21:25 00:48 07:03 


 


Sodium  142  137  138  (137-145)  mmol/L


 


Potassium  4.0  3.4 L  3.1 L  (3.6-5.0)  mmol/L


 


Chloride  101.8  99.2  99.7  ()  mmol/L


 


Carbon Dioxide  27  23  25  (22-30)  mmol/L


 


BUN  5 L  5 L  4 L  (9-20)  mg/dL


 


Creatinine  0.6 L  0.5 L  0.5 L  (0.8-1.3)  mg/dL


 


Glucose  148 H  151 H  141 H  ()  mg/dL


 


Calcium  8.6  8.9  8.5  (8.4-10.2)  mg/dL


 


AST  47 H    (5-40)  units/L


 


ALT  27    (7-56)  units/L


 


Alkaline Phosphatase  173 H    ()  units/L


 


Total Protein  6.4    (6.3-8.2)  g/dL


 


Albumin  3.5 L    (3.9-5)  g/dL














EKG interpretations





- Telemetry


EKG Rhythm: Sinus Rhythm





Assessment and Plan





- Patient Problems


(1) Anasarca


Current Visit: No   Status: Acute   


Plan to address problem: 


Patient has chronic ascites, chronic lower extremity edema.  This is in the 

setting of morbid obesity, chronic sleep apnea, chronic alcohol abuse and liver 

disease.  Continue management with diuretics as tolerated, defer to internal 

medicine and gastroenterology for further management of patient's liver disease 

and chronic ascites.

## 2021-03-22 NOTE — DISCHARGE SUMMARY
Providers





- Providers


Date of Admission: 


03/21/21 22:31





Date of discharge: 03/22/21


Attending physician: 


YAMILETH VALDEZ





                                        





03/21/21


Consult to Cardiac Rehabilitation [CONS] Routine 


   Reason For Exam: Phase I





03/21/21 22:47


Consult to Dietitian/Nutrition [CONS] Routine 


   Physician Instructions: 


   Reason For Exam: 


   Reason for Consult: Diet education


Consult to Physician [CONS] Routine 


   Comment: 


   Consulting Provider: MANISH ROY


   Physician Instructions: 


   Reason For Exam: chest pain, CHF exac.











Primary care physician: 


PRIMARY CARE MD








Hospitalization


Condition: Fair


Hospital course: 








46-year-old male with known history of hypertension, COPD, diabetes mellitus, 

and CHF  presenting to the emergency room today complaining of shortness of 

breath and progressive swelling of his lower extremities.  Patient was just seen

 in this hospital recently and discharged earlier this month with similar 

complaints.  Indicates that his swelling is increased over the past 2 days.  He 

has had significant worsening of shortness of breath and intermittent substernal

 chest pain.  Chest pain is sharp in nature and he has had associated nausea and

 vomiting and occasional diaphoresis.


Patient has had some productive cough over the past week.  He has not had any 

fever. Sputum has been slightly greenish in color.  On a scale of 10 chest pain 

was said to be about 7-8 over 10 in severity.


Patient has been compliant with his medications.





Work-up in the emergency room today reveals: Slightly elevated BNP of 547.  

Chest x-ray reveals no acute cardiopulmonary abnormality.


EKG and troponin were unremarkable.





Patient is being admitted for chest pain and CHF exacerbation.











 Assessment and Plan 





- Patient Problems


(1) Chest pain


Current Visit: No   Status: Acute   


Plan to address problem: 


Stress test could not be done because of his weight and constant movement


His troponins are negative


We will follow with cardiology as outpatient





(2) CHF exacerbation


Current Visit: No   Status: Acute   


Plan to address problem: 


The patient is a 46-year-old man with morbid obesity, who weighs over 400 

pounds.  He has sleep apnea for which he uses a CPAP machine at home, and is 

reported with chronic alcohol abuse.  As a result of his obesity and chronic 

lung disease, he has chronic fluid overload manifested by ascites and lower 

extremity edema.  His ascites has often required paracentesis, most recently 2 

weeks ago he had 3400 cc of fluid aspirated.  His chronic ascites has been 

attributed to liver disease associated with his chronic alcoholism, and 

pulmonary disease associated with his severe sleep apnea.





He obtains his outpatient care at the [facility] but has been managed in this 

hospital several times in the past year.  An echocardiogram done last month 

showed normal left ventricular systolic function with ejection fraction 55 to 

60%, no significant valvular lesions.





EKG on this presentation is normal sinus rhythm with nonspecific ST and T wave 

abnormalities.  Chest x-ray shows a normal-sized cardiac silhouette, no evidence

 of interstitial edema or heart failure.





We will discharge on diuretics with Zaroxolyn 30 minutes before the Lasix to 

potentiate the Lasix affect


Ascites and pedal edema secondary to pulmonary hypertension and alcoholism








(3) HTN (hypertension)


Current Visit: No   Status: Chronic   


Plan to address problem: 


Resume routine home medications and monitor vital signs closely.








(4) Morbid obesity with BMI of 40.0-44.9, adult


Current Visit: No   Status: Chronic   


Plan to address problem: 


Patient was counseled to see bariatric surgery





(5) Tobacco use   


Current Visit: No   Status: Chronic   


Plan to address problem: 


Patient counseled on quitting tobacco abuse.


We will place on nicotine patch as needed.  





6 )EtOH dependence 


Patient counseled about alcoholism


Patient to follow-up with alcohol rehab








7)Ascites


Diuretics for now











Disposition: DC-01 TO HOME OR SELFCARE


Final Discharge Diagnosis (Prints w/discharge instructions): Acute coronary 

syndrome.  CHF exacerbation.  Pulmonary hypertension.  Ascites.  Pedal edema


Time spent for discharge: 35 minutes





- Discharge Diagnoses


(1) Anasarca


Status: Acute   





(2) CHF exacerbation


Status: Acute   





Core Measure Documentation





- Palliative Care


Palliative Care/ Comfort Measures: Not Applicable





- Core Measures


Any of the following diagnoses?: heart failure





- Heart Failure Discharge Requirements


ACE/ARB for LVSD if EF <40%: Yes


Beta blocker at discharge: Yes





Exam





- Constitutional


Vitals: 


                                        











Temp Pulse Resp BP Pulse Ox


 


 98.4 F   106 H  18   141/69   97 


 


 03/22/21 07:24  03/22/21 08:58  03/22/21 07:24  03/22/21 07:24  03/22/21 12:00











General appearance: Present: no acute distress, well-nourished





- EENT


Eyes: Present: PERRL


ENT: hearing intact, clear oral mucosa





- Neck


Neck: Present: supple, normal ROM





- Respiratory


Respiratory effort: normal


Respiratory: bilateral: CTA





- Cardiovascular


Heart rate: 78


Rhythm: regular


Heart Sounds: Present: S1 & S2.  Absent: rub, click





- Extremities


Extremities: pulses symmetrical, No edema


Extremity abnormal: other (Bilateral pedal edema 3+)


Peripheral Pulses: within normal limits





- Abdominal


General gastrointestinal: Present: soft, non-tender, distended, normal bowel 

sounds, other (Ascites plus plus)


Male genitourinary: Present: normal





- Integumentary


Integumentary: Present: clear, warm, dry





- Musculoskeletal


Musculoskeletal: gait normal, strength equal bilaterally





- Psychiatric


Psychiatric: appropriate mood/affect, intact judgment & insight





- Neurologic


Neurologic: CNII-XII intact, moves all extremities





Plan


Activity: no restrictions


Diet: low salt


Special Instructions: restrict fluid intake to (1.5 L/day), other


Follow up with: 


PRIMARY CARE,MD [Primary Care Provider] - 7 Days


WISAM BRADFORD MD [Staff Physician] - 7 Days

## 2021-03-25 NOTE — ELECTROCARDIOGRAPH REPORT
[facility]

                                       

Test Date:    2021               Test Time:    12:01:01

Pat Name:     FRANCISCO ARRIAGA             Department:   

Patient ID:   [id number]          Room:         A487 1

Gender:       M                        Technician:   MANUELA

:          1975               Requested By: NOEMÍ WRIGHT

Order Number: [id number]              Reading MD:   Gaurav Breen

                                 Measurements

Intervals                              Axis          

Rate:         96                       P:            77

AZ:           184                      QRS:          79

QRSD:         99                       T:            108

QT:           350                                    

QTc:          443                                    

                           Interpretive Statements

Sinus rhythm

Ventricular premature complex

Nonspecific T abnormalities, lateral leads

Compared to ECG 2021 22:22:26

Ventricular premature complex(es) now present

T-wave abnormality now present

Right-axis deviation no longer present

Electronically Signed On 3- 7:55:12 PDT by Gaurav Breen

## 2021-04-28 ENCOUNTER — HOSPITAL ENCOUNTER (EMERGENCY)
Dept: HOSPITAL 5 - ED | Age: 46
Discharge: HOME | End: 2021-04-28
Payer: COMMERCIAL

## 2021-04-28 VITALS — DIASTOLIC BLOOD PRESSURE: 62 MMHG | SYSTOLIC BLOOD PRESSURE: 132 MMHG

## 2021-04-28 DIAGNOSIS — I50.9: ICD-10-CM

## 2021-04-28 DIAGNOSIS — Z98.890: ICD-10-CM

## 2021-04-28 DIAGNOSIS — Z87.891: ICD-10-CM

## 2021-04-28 DIAGNOSIS — Z79.899: ICD-10-CM

## 2021-04-28 DIAGNOSIS — E11.9: ICD-10-CM

## 2021-04-28 DIAGNOSIS — Z91.041: ICD-10-CM

## 2021-04-28 DIAGNOSIS — R52: ICD-10-CM

## 2021-04-28 DIAGNOSIS — J44.9: ICD-10-CM

## 2021-04-28 DIAGNOSIS — G47.33: ICD-10-CM

## 2021-04-28 DIAGNOSIS — I11.0: ICD-10-CM

## 2021-04-28 DIAGNOSIS — L89.90: Primary | ICD-10-CM

## 2021-04-28 LAB
ALBUMIN SERPL-MCNC: 4.8 G/DL (ref 3.9–5)
ALT SERPL-CCNC: 33 UNITS/L (ref 7–56)
BASOPHILS # (AUTO): 0.1 K/MM3 (ref 0–0.1)
BASOPHILS NFR BLD AUTO: 0.5 % (ref 0–1.8)
BUN SERPL-MCNC: 23 MG/DL (ref 9–20)
BUN/CREAT SERPL: 29 %
CALCIUM SERPL-MCNC: 10.3 MG/DL (ref 8.4–10.2)
EOSINOPHIL # BLD AUTO: 0.6 K/MM3 (ref 0–0.4)
EOSINOPHIL NFR BLD AUTO: 4.9 % (ref 0–4.3)
HCT VFR BLD CALC: 32.5 % (ref 35.5–45.6)
HEMOLYSIS INDEX: 1
HGB BLD-MCNC: 11.2 GM/DL (ref 11.8–15.2)
LYMPHOCYTES # BLD AUTO: 2.1 K/MM3 (ref 1.2–5.4)
LYMPHOCYTES NFR BLD AUTO: 17.3 % (ref 13.4–35)
MCHC RBC AUTO-ENTMCNC: 34 % (ref 32–34)
MCV RBC AUTO: 93 FL (ref 84–94)
MONOCYTES # (AUTO): 0.7 K/MM3 (ref 0–0.8)
MONOCYTES % (AUTO): 6 % (ref 0–7.3)
PLATELET # BLD: 440 K/MM3 (ref 140–440)
RBC # BLD AUTO: 3.5 M/MM3 (ref 3.65–5.03)

## 2021-04-28 PROCEDURE — 87040 BLOOD CULTURE FOR BACTERIA: CPT

## 2021-04-28 PROCEDURE — 99284 EMERGENCY DEPT VISIT MOD MDM: CPT

## 2021-04-28 PROCEDURE — 82140 ASSAY OF AMMONIA: CPT

## 2021-04-28 PROCEDURE — 83880 ASSAY OF NATRIURETIC PEPTIDE: CPT

## 2021-04-28 PROCEDURE — 93970 EXTREMITY STUDY: CPT

## 2021-04-28 PROCEDURE — 80053 COMPREHEN METABOLIC PANEL: CPT

## 2021-04-28 PROCEDURE — 36415 COLL VENOUS BLD VENIPUNCTURE: CPT

## 2021-04-28 PROCEDURE — 85025 COMPLETE CBC W/AUTO DIFF WBC: CPT

## 2021-04-28 NOTE — EMERGENCY DEPARTMENT REPORT
ED General Adult HPI





- General


Chief complaint: Pain General


Stated complaint: HIP INJURY


Time Seen by Provider: 04/28/21 17:09


Source: patient


Mode of arrival: Ambulatory


Limitations: No Limitations





- History of Present Illness


Initial comments: 





Patient is 46 years old morbidly obese male with history of obstructive sleep 

apnea, congestive heart failure and hypertension.  [facility].  

Patient presented to the ER complaining of bilateral lower extremity swelling 

and redness for approximately 2month.  Patient is pointing to swelling and d

iscoloration to hip area on both sides.  Patient denied any fever or chills.  He

also denied any increased shortness of breath or difficulty breathing.  No chest

pain.  Patient stated that he is currently taking antibiotic for his genital 

infection.  He stated that he is taking Keflex for 10 days and he supposed to 

finish it in 6 days.


-: month(s)


Location: pelvis, lower extremity


Severity scale (0 -10): 3


Consistency: constant


Associated Symptoms: denies other symptoms





- Related Data


                                  Previous Rx's











 Medication  Instructions  Recorded  Last Taken  Type


 


ALBUTEROL Inhaler(NF) [VENTOLIN 2 puff IH QID PRN #1 inha 02/06/19 Unknown Rx





Inhaler(NF)]    


 


Fluticasone [Flonase] 1 spray NS QDAY #1 bottle 03/02/21 Unknown Rx


 


Folic Acid/Vit B Comp W-C [Renal 1 cap PO QDAY #30 capsule 03/02/21 Unknown Rx





Caps]    


 


Multivitamin Tab [Multiple Vitamin 1 each PO QDAY #30 tablet 03/02/21 Unknown Rx





TAB (Theragran)]    


 


metFORMIN [Glucophage] 850 mg PO BID #60 tablet 03/03/21 Unknown Rx


 


Lispro Insulin [HumaLOG] 0 unit SUB-Q ACHS 30 Days 03/04/21 Unknown Rx


 


Albuterol Sulfate [Proair 90 mcg IH Q4HR PRN #2 aer.pow.ba 03/22/21 Unknown Rx





Respiclick]    


 


Aspirin EC [Halfprin EC] 81 mg PO QDAY #100 03/22/21 Unknown Rx


 


Famotidine [Pepcid] 20 mg PO BID #60 tablet 03/22/21 Unknown Rx


 


Furosemide [Lasix TAB] 40 mg PO BID #60 tablet 03/22/21 Unknown Rx


 


Insulin NPH/Regular [NovoLIN 70/30] 28 unit SUB-Q BIDDIAB 30 Days #2 03/22/21 

Unknown Rx





 vial   


 


Ipratropium (Nf) [Atrovent HFA 2 puff IH Q6HR PRN #1 inha 03/22/21 Unknown Rx





17MCG/PUFF]    


 


LORazepam [Ativan] 0.5 mg PO BID #20 tab 03/22/21 Unknown Rx


 


Potassium Chloride [K-Dur] 20 meq PO BID #60 tab 03/22/21 Unknown Rx


 


Pravastatin Sodium [Pravastatin] 10 mg PO QHS 30 Days #30 tablet 03/22/21 

Unknown Rx


 


Thiamine [Vitamin B-1] 100 mg PO QDAY #100 tablet 03/22/21 Unknown Rx


 


amLODIPine 5 mg PO QDAY #30 tablet 03/22/21 Unknown Rx


 


lisinopriL [Zestril TAB] 10 mg PO QDAY #30 tablet 03/22/21 Unknown Rx


 


metOLazone [Zaroxolyn] 5 mg PO BID #60 tablet 03/22/21 Unknown Rx











                                    Allergies











Allergy/AdvReac Type Severity Reaction Status Date / Time


 


Iodinated Contrast Media Allergy  Rash Verified 04/28/21 16:48














ED Review of Systems


ROS: 


Stated complaint: HIP INJURY


Other details as noted in HPI





Comment: All other systems reviewed and negative


Constitutional: denies: chills, fever


Respiratory: shortness of breath (chronic).  denies: cough, orthopnea, SOB with 

exertion, SOB at rest


Gastrointestinal: denies: abdominal pain, nausea, vomiting


Skin: lesions


Neurological: denies: headache, weakness





ED Past Medical Hx





- Past Medical History


Hx Hypertension: Yes


Hx Congestive Heart Failure: Yes


Hx Diabetes: Yes


Hx Asthma: No


Hx COPD: Yes (No home O2)


Additional medical history: Pulmonary hypertension





- Surgical History


Additional Surgical History: Cyst removed from eye





- Social History


Smoking Status: Former Smoker


Substance Use Type: None





- Medications


Home Medications: 


                                Home Medications











 Medication  Instructions  Recorded  Confirmed  Last Taken  Type


 


ALBUTEROL Inhaler(NF) [VENTOLIN 2 puff IH QID PRN #1 inha 02/06/19 03/21/21 

Unknown Rx





Inhaler(NF)]     


 


Fluticasone [Flonase] 1 spray NS QDAY #1 bottle 03/02/21 03/21/21 Unknown Rx


 


Folic Acid/Vit B Comp W-C [Renal 1 cap PO QDAY #30 capsule 03/02/21 03/21/21 

Unknown Rx





Caps]     


 


Multivitamin Tab [Multiple Vitamin 1 each PO QDAY #30 tablet 03/02/21 03/21/21 

Unknown Rx





TAB (Theragran)]     


 


metFORMIN [Glucophage] 850 mg PO BID #60 tablet 03/03/21 03/21/21 Unknown Rx


 


Lispro Insulin [HumaLOG] 0 unit SUB-Q ACHS 30 Days 03/04/21 03/21/21 Unknown Rx


 


Albuterol Sulfate [Proair 90 mcg IH Q4HR PRN #2 aer.pow.ba 03/22/21  Unknown Rx





Respiclick]     


 


Aspirin EC [Halfprin EC] 81 mg PO QDAY #100 03/22/21  Unknown Rx


 


Famotidine [Pepcid] 20 mg PO BID #60 tablet 03/22/21  Unknown Rx


 


Furosemide [Lasix TAB] 40 mg PO BID #60 tablet 03/22/21  Unknown Rx


 


Insulin NPH/Regular [NovoLIN 70/30] 28 unit SUB-Q BIDDIAB 30 Days #2 03/22/21  

Unknown Rx





 vial    


 


Ipratropium (Nf) [Atrovent HFA 2 puff IH Q6HR PRN #1 inha 03/22/21  Unknown Rx





17MCG/PUFF]     


 


LORazepam [Ativan] 0.5 mg PO BID #20 tab 03/22/21  Unknown Rx


 


Potassium Chloride [K-Dur] 20 meq PO BID #60 tab 03/22/21  Unknown Rx


 


Pravastatin Sodium [Pravastatin] 10 mg PO QHS 30 Days #30 tablet 03/22/21  

Unknown Rx


 


Thiamine [Vitamin B-1] 100 mg PO QDAY #100 tablet 03/22/21  Unknown Rx


 


amLODIPine 5 mg PO QDAY #30 tablet 03/22/21  Unknown Rx


 


lisinopriL [Zestril TAB] 10 mg PO QDAY #30 tablet 03/22/21  Unknown Rx


 


metOLazone [Zaroxolyn] 5 mg PO BID #60 tablet 03/22/21  Unknown Rx














ED Physical Exam





- General


Limitations: No Limitations


General appearance: alert, in no apparent distress





- Head


Head exam: Present: atraumatic, normocephalic, normal inspection





- Eye


Eye exam: Present: normal appearance, PERRL





- ENT


ENT exam: Present: normal exam, normal orophraynx, mucous membranes moist





- Neck


Neck exam: Present: normal inspection, full ROM.  Absent: tenderness, 

meningismus





- Respiratory


Respiratory exam: Present: normal lung sounds bilaterally





- Cardiovascular


Cardiovascular Exam: Present: regular rate, normal rhythm, normal heart sounds





- GI/Abdominal


GI/Abdominal exam: Present: soft, normal bowel sounds.  Absent: distended, 

tenderness, guarding, rebound, rigid, organomegaly, mass, bruit, pulsatile mass,

 hernia





- Extremities Exam


Extremities exam: Present: full ROM.  Absent: tenderness, calf tenderness





- Back Exam


Back exam: Present: normal inspection, full ROM.  Absent: CVA tenderness (R), 

CVA tenderness (L)





- Neurological Exam


Neurological exam: Present: alert, oriented X3, CN II-XII intact.  Absent: motor

 sensory deficit





- Psychiatric


Psychiatric exam: Present: normal mood





- Skin


Skin exam: Present: warm, dry, intact, abrasion, ecchymosis





ED Course





                                   Vital Signs











  04/28/21 04/28/21





  16:49 23:26


 


Temperature 98.6 F 98.2 F


 


Pulse Rate 100 H 82


 


Respiratory 20 16





Rate  


 


Blood Pressure 147/100 


 


Blood Pressure  132/48





[Left]  


 


O2 Sat by Pulse 96 100





Oximetry  














ED Medical Decision Making





- Lab Data


Result diagrams: 


                                 04/28/21 17:34





                                 04/28/21 17:34





- Radiology Data


Radiology results: report reviewed





- Medical Decision Making





Patient is 46 years old morbidly obese male with history of obstructive sleep 

apnea, congestive heart failure and hypertension.  [facility] followed [facility].  

Patient presented to the ER complaining of bilateral lower extremity swelling 

and redness for approximately 2month.  Patient is pointing to swelling and 

discoloration to hip area on both sides.  Patient denied any fever or chills.  

He also denied any increased shortness of breath or difficulty breathing.  No 

chest pain.  Patient stated that he is currently taking antibiotic for his 

genital infection.  He stated that he is taking Keflex for 10 days and he 

supposed to finish it in 6 days.





Labs reviewed and is unremarkable except for mild leukocytosis.  No evidence of 

acute CHF.  Doppler ultrasound is negative for DVT.  When I examined the area 

that the patient is pointing to the bilateral hip area it looks like most likely

 pressure ulcer from probably laying on it for a long time however there is no 

breaking in the skin.  I advised the patient to return side-to-side every 2 

hours.  I do not see any evidence of infection or cellulitis or abscess at this 

moment however patient advised to continue his current antibiotic and to return 

to the ER if he develop any new symptoms.


Critical care attestation.: 


If time is entered above; I have spent that time in minutes in the direct care 

of this critically ill patient, excluding procedure time.








ED Disposition


Clinical Impression: 


 Total body pain, Pressure ulcer





Disposition: DC-01 TO HOME OR SELFCARE


Is pt being admited?: No


Condition: Stable


Instructions:  Preventing Pressure Injuries, Acute Pain, Adult


Referrals: 


PRIMARY CARE,MD [Primary Care Provider] - 3-5 Days

## 2021-04-28 NOTE — EVENT NOTE
ED Screening Note


ED Screening Note: 





Patient is a 46-year-old male presents emergency room complaints of an infection

to his bilateral lower extremities that initially began on March 5


He states it has been worsening and is now traveling down the legs


He states he was released from the hospital on March 5


He states that he has been on antibiotics for approximately 1 week due to a 

ingrown hair on the testicle, he states that improved but he has had worsening 

of his infection of his legs








On exam there is significant induration and cellulitis and appears to be 

lymphadenitis of the bilateral lower extremities








This initial assessment/diagnostic orders/clinical plan/treatment(s) is/are 

subject to change based on patients health status, clinical progression and re-

assessment by fellow clinical providers in the ED. Further treatment and workup 

at subsequent clinical providers discretion. Patient/guardian urged not to elope

from the ED as their condition may be serious if not clinically assessed and 

managed. 





Initial orders include: 


Labs, ultrasound, antibiotics

## 2021-04-28 NOTE — VASCULAR LAB REPORT
DUPLEX DOPPLER LOWER EXTREMITY VEINS, BILATERAL



INDICATION / CLINICAL INFORMATION:

Bilateral lower extremity edema and pain, lymph infection.



TECHNIQUE:

Duplex doppler imaging was performed through the veins of both lower extremities using venous devin
karina and other maneuvers.



COMPARISON: 

None available.



FINDINGS:

Right Common Femoral vein: Negative.

Right Femoral vein: Negative.

Right Popliteal vein: Negative.

Right Calf veins: Negative.



Left Common Femoral vein: Negative.

Left Femoral vein: Negative.

Left Popliteal vein: Negative.

Left Calf veins: Negative.



Additional findings: None.



IMPRESSION:

1. No sonographic evidence for DVT in either lower extremity.



Signer Name: Jarrod Martinez MD 

Signed: 4/28/2021 7:11 PM

Workstation Name: [id number]